# Patient Record
Sex: FEMALE | Race: WHITE | NOT HISPANIC OR LATINO | Employment: OTHER
[De-identification: names, ages, dates, MRNs, and addresses within clinical notes are randomized per-mention and may not be internally consistent; named-entity substitution may affect disease eponyms.]

---

## 2017-02-02 ENCOUNTER — HOSPITAL (OUTPATIENT)
Dept: OTHER | Age: 77
End: 2017-02-02
Attending: INTERNAL MEDICINE

## 2017-05-09 ENCOUNTER — HOSPITAL (OUTPATIENT)
Dept: OTHER | Age: 77
End: 2017-05-09
Attending: INTERNAL MEDICINE

## 2017-05-09 ENCOUNTER — IMAGING SERVICES (OUTPATIENT)
Dept: OTHER | Age: 77
End: 2017-05-09

## 2017-05-09 ENCOUNTER — CHARTING TRANS (OUTPATIENT)
Dept: OTHER | Age: 77
End: 2017-05-09

## 2017-05-09 LAB
ACANTHOCYTES (ACANT): ABNORMAL
ALBUMIN SERPL-MCNC: 3.9 GM/DL (ref 3.6–5.1)
ALBUMIN/GLOB SERPL: 1.1 {RATIO} (ref 1–2.4)
ALP SERPL-CCNC: 63 UNIT/L (ref 45–117)
ALT SERPL-CCNC: 13 UNIT/L
ANALYZER ANC (IANC): ABNORMAL
ANION GAP SERPL CALC-SCNC: 14 MMOL/L (ref 10–20)
AST SERPL-CCNC: 39 UNIT/L
BASOPHILIC STIPLING (BAST): PRESENT
BASOPHILS # BLD: 0 THOUSAND/MCL (ref 0–0.3)
BASOPHILS NFR BLD: 0 %
BILIRUB SERPL-MCNC: 1 MG/DL (ref 0.2–1)
BUN SERPL-MCNC: 14 MG/DL (ref 6–20)
BUN/CREAT SERPL: 21 (ref 7–25)
CALCIUM SERPL-MCNC: 10 MG/DL (ref 8.4–10.2)
CHLORIDE: 103 MMOL/L (ref 98–107)
CO2 SERPL-SCNC: 25 MMOL/L (ref 21–32)
CREAT SERPL-MCNC: 0.66 MG/DL (ref 0.51–0.95)
DIFFERENTIAL METHOD BLD: ABNORMAL
EOSINOPHIL # BLD: 0.1 THOUSAND/MCL (ref 0.1–0.5)
EOSINOPHIL NFR BLD: 1 %
ERYTHROCYTE [DISTWIDTH] IN BLOOD: 19.3 % (ref 11–15)
GLOBULIN SER-MCNC: 3.5 GM/DL (ref 2–4)
GLUCOSE SERPL-MCNC: 145 MG/DL (ref 65–99)
HEMATOCRIT: 32.9 % (ref 36–46.5)
HGB BLD-MCNC: 11 GM/DL (ref 12–15.5)
HOWELL-JOLLY BODIES (HJBO): PRESENT
LARGE PLATELETS (PLTL): PRESENT
LIPASE SERPL-CCNC: 110 UNIT/L (ref 73–393)
LYMPHOCYTES # BLD: 2 THOUSAND/MCL (ref 1–4)
LYMPHOCYTES NFR BLD: 16 %
MCH RBC QN AUTO: 20.5 PG (ref 26–34)
MCHC RBC AUTO-ENTMCNC: 33.4 GM/DL (ref 32–36.5)
MCV RBC AUTO: 61.3 FL (ref 78–100)
MICROCYTOSIS (MICY): ABNORMAL
MONOCYTES # BLD: 1.1 THOUSAND/MCL (ref 0.3–0.9)
MONOCYTES NFR BLD: 10 %
NEUTROPHILS # BLD: 8 THOUSAND/MCL (ref 1.8–7.7)
NEUTS SEG NFR BLD: 71 %
NRBC BLD MANUAL-RTO: 4 /100 WBC
OVALOCYTES (OVALO): ABNORMAL
PATH REV BLD -IMP: ABNORMAL
PLATELET # BLD: 409 THOUSAND/MCL (ref 140–450)
POTASSIUM SERPL-SCNC: 4.3 MMOL/L (ref 3.4–5.1)
PROT SERPL-MCNC: 7.4 GM/DL (ref 6.4–8.2)
RBC # BLD: 5.37 MILLION/MCL (ref 4–5.2)
SHISTOCYTES (SHSTO): ABNORMAL
SODIUM SERPL-SCNC: 138 MMOL/L (ref 135–145)
TOXIC VACUOLATION (TOXV): PRESENT
VARIANT LYMPHS NFR BLD: 2 % (ref 0–5)
WBC # BLD: 11.3 THOUSAND/MCL (ref 4.2–11)

## 2017-05-10 ENCOUNTER — CHARTING TRANS (OUTPATIENT)
Dept: OTHER | Age: 77
End: 2017-05-10

## 2017-05-10 LAB
MAGNESIUM SERPL-MCNC: 2 MG/DL (ref 1.7–2.4)
PHOSPHATE SERPL-MCNC: 2.4 MG/DL (ref 2.4–4.7)

## 2017-06-01 ENCOUNTER — HOSPITAL (OUTPATIENT)
Dept: OTHER | Age: 77
End: 2017-06-01
Attending: INTERNAL MEDICINE

## 2017-06-01 LAB — GLUCOSE BLDC GLUCOMTR-MCNC: 89 MG/DL (ref 65–99)

## 2017-06-06 ENCOUNTER — HOSPITAL (OUTPATIENT)
Dept: OTHER | Age: 77
End: 2017-06-06
Attending: INTERNAL MEDICINE

## 2017-09-05 ENCOUNTER — HOSPITAL (OUTPATIENT)
Dept: OTHER | Age: 77
End: 2017-09-05
Attending: INTERNAL MEDICINE

## 2017-10-03 ENCOUNTER — HOSPITAL (OUTPATIENT)
Dept: OTHER | Age: 77
End: 2017-10-03
Attending: ANESTHESIOLOGY

## 2017-11-08 ENCOUNTER — HOSPITAL (OUTPATIENT)
Dept: OTHER | Age: 77
End: 2017-11-08
Attending: INTERNAL MEDICINE

## 2018-03-27 ENCOUNTER — LAB SERVICES (OUTPATIENT)
Dept: OTHER | Age: 78
End: 2018-03-27

## 2018-03-27 ENCOUNTER — HOSPITAL (OUTPATIENT)
Dept: OTHER | Age: 78
End: 2018-03-27
Attending: INTERNAL MEDICINE

## 2018-03-27 LAB
ACANTHOCYTES (ACANT): ABNORMAL
ALBUMIN SERPL-MCNC: 3.8 G/DL (ref 3.6–5.1)
ALBUMIN SERPL-MCNC: 3.8 GM/DL (ref 3.6–5.1)
ALBUMIN/GLOB SERPL: 1 (ref 1–2.4)
ALBUMIN/GLOB SERPL: 1 {RATIO} (ref 1–2.4)
ALP SERPL-CCNC: 60 UNIT/L (ref 45–117)
ALP SERPL-CCNC: 60 UNITS/L (ref 45–117)
ALT SERPL-CCNC: 16 UNIT/L
ALT SERPL-CCNC: 16 UNITS/L
ANALYZER ANC (IANC): ABNORMAL
ANION GAP SERPL CALC-SCNC: 10 MMOL/L (ref 10–20)
ANION GAP SERPL CALC-SCNC: 10 MMOL/L (ref 10–20)
AST SERPL-CCNC: 36 UNIT/L
AST SERPL-CCNC: 36 UNITS/L
BASOPHILIC STIPLING (BAST): PRESENT
BASOPHILS # BLD: 0 THOUSAND/MCL (ref 0–0.3)
BASOPHILS NFR BLD: 0 %
BILIRUB SERPL-MCNC: 0.7 MG/DL (ref 0.2–1)
BILIRUB SERPL-MCNC: 0.7 MG/DL (ref 0.2–1)
BUN SERPL-MCNC: 16 MG/DL (ref 6–20)
BUN SERPL-MCNC: 16 MG/DL (ref 6–20)
BUN/CREAT SERPL: 23 (ref 7–25)
BUN/CREAT SERPL: 23 (ref 7–25)
CALCIUM SERPL-MCNC: 9.8 MG/DL (ref 8.4–10.2)
CALCIUM SERPL-MCNC: 9.8 MG/DL (ref 8.4–10.2)
CHLORIDE SERPL-SCNC: 101 MMOL/L (ref 98–107)
CHLORIDE: 101 MMOL/L (ref 98–107)
CO2 SERPL-SCNC: 31 MMOL/L (ref 21–32)
CO2 SERPL-SCNC: 31 MMOL/L (ref 21–32)
CREAT SERPL-MCNC: 0.69 MG/DL (ref 0.51–0.95)
CREAT SERPL-MCNC: 0.69 MG/DL (ref 0.51–0.95)
DIFFERENTIAL METHOD BLD: ABNORMAL
EOSINOPHIL # BLD: 0.1 THOUSAND/MCL (ref 0.1–0.5)
EOSINOPHIL NFR BLD: 1 %
ERYTHROCYTE [DISTWIDTH] IN BLOOD: 20.3 % (ref 11–15)
GLOBULIN SER-MCNC: 4 G/DL (ref 2–4)
GLOBULIN SER-MCNC: 4 GM/DL (ref 2–4)
GLUCOSE SERPL-MCNC: 101 MG/DL (ref 65–99)
GLUCOSE SERPL-MCNC: 101 MG/DL (ref 65–99)
HEMATOCRIT: 30.5 % (ref 36–46.5)
HGB BLD-MCNC: 10.1 GM/DL (ref 12–15.5)
IMM GRANULOCYTES # BLD AUTO: 0 THOUSAND/MCL (ref 0–0.2)
IMM GRANULOCYTES NFR BLD: 0 %
LARGE PLATELETS (PLTL): PRESENT
LIPASE SERPL-CCNC: 119 UNIT/L (ref 73–393)
LIPASE SERPL-CCNC: 119 UNITS/L (ref 73–393)
LYMPHOCYTES # BLD: 2.3 THOUSAND/MCL (ref 1–4)
LYMPHOCYTES NFR BLD: 21 %
MCH RBC QN AUTO: 20.2 PG (ref 26–34)
MCHC RBC AUTO-ENTMCNC: 33.1 GM/DL (ref 32–36.5)
MCV RBC AUTO: 61.1 FL (ref 78–100)
MICROCYTOSIS (MICY): ABNORMAL
MONOCYTES # BLD: 0.9 THOUSAND/MCL (ref 0.3–0.9)
MONOCYTES NFR BLD: 8 %
NEUTROPHILS # BLD: 7.9 THOUSAND/MCL (ref 1.8–7.7)
NEUTROPHILS NFR BLD: 70 %
NEUTS SEG NFR BLD: ABNORMAL %
OVALOCYTES (OVALO): ABNORMAL
PERCENT NRBC: 3 %
PLATELET # BLD: 287 THOUSAND/MCL (ref 140–450)
POTASSIUM SERPL-SCNC: 4.2 MMOL/L (ref 3.4–5.1)
POTASSIUM SERPL-SCNC: 4.2 MMOL/L (ref 3.4–5.1)
PROT SERPL-MCNC: 7.8 GM/DL (ref 6.4–8.2)
RBC # BLD: 4.99 MILLION/MCL (ref 4–5.2)
SHISTOCYTES (SHSTO): ABNORMAL
SODIUM SERPL-SCNC: 138 MMOL/L (ref 135–145)
SODIUM SERPL-SCNC: 138 MMOL/L (ref 135–145)
SPHEROCYTES (SPHE): ABNORMAL
TARGET CELLS (TARGET): ABNORMAL
TOTAL PROTEIN: 7.8 G/DL (ref 6.4–8.2)
TOXIC VACUOLATION (TOXV): PRESENT
WBC # BLD: 11.2 THOUSAND/MCL (ref 4.2–11)

## 2018-03-28 ENCOUNTER — HOSPITAL (OUTPATIENT)
Dept: OTHER | Age: 78
End: 2018-03-28
Attending: INTERNAL MEDICINE

## 2018-03-28 ENCOUNTER — IMAGING SERVICES (OUTPATIENT)
Dept: OTHER | Age: 78
End: 2018-03-28

## 2018-03-28 LAB
ACANTHOCYTES (ACANT): ABNORMAL
ANALYZER ANC (IANC): ABNORMAL
BASOPHILIC STIPLING (BAST): PRESENT
BASOPHILS # BLD: 0 K/MCL (ref 0–0.3)
BASOPHILS NFR BLD: 0 %
DIFFERENTIAL METHOD BLD: ABNORMAL
EOSINOPHIL # BLD: 0.1 K/MCL (ref 0.1–0.5)
EOSINOPHIL NFR BLD: 1 %
ERYTHROCYTE [DISTWIDTH] IN BLOOD: 20.3 % (ref 11–15)
HEMATOCRIT: 30.5 % (ref 36–46.5)
HEMOGLOBIN: 10.1 G/DL (ref 12–15.5)
IMM GRANULOCYTES # BLD AUTO: 0 K/MCL (ref 0–0.2)
IMM GRANULOCYTES NFR BLD: 0 %
LARGE PLATELETS (PLTL): PRESENT
LYMPHOCYTES # BLD: 2.3 K/MCL (ref 1–4)
LYMPHOCYTES NFR BLD: 21 %
MEAN CORPUSCULAR HEMOGLOBIN: 20.2 PG (ref 26–34)
MEAN CORPUSCULAR HGB CONC: 33.1 G/DL (ref 32–36.5)
MEAN CORPUSCULAR VOLUME: 61.1 FL (ref 78–100)
MICROCYTOSIS (MICY): ABNORMAL
MONOCYTES # BLD: 0.9 K/MCL (ref 0.3–0.9)
MONOCYTES NFR BLD: 8 %
NEUTROPHILS # BLD: 7.9 K/MCL (ref 1.8–7.7)
NEUTROPHILS NFR BLD: 70 %
NEUTS SEG NFR BLD: ABNORMAL
NRBC (NRBCRE): 3 %
OVALOCYTES (OVALO): ABNORMAL
PLATELET COUNT: 287 K/MCL (ref 140–450)
RED CELL COUNT: 4.99 MIL/MCL (ref 4–5.2)
SHISTOCYTES (SHSTO): ABNORMAL
SPHEROCYTES (SPHE): ABNORMAL
TARGET CELLS (TARGET): ABNORMAL
TOXIC VACUOLATION (TOXV): PRESENT
WHITE BLOOD COUNT: 11.2 K/MCL (ref 4.2–11)

## 2019-01-01 ENCOUNTER — EXTERNAL RECORD (OUTPATIENT)
Dept: HEALTH INFORMATION MANAGEMENT | Facility: OTHER | Age: 79
End: 2019-01-01

## 2019-01-22 ENCOUNTER — APPOINTMENT (OUTPATIENT)
Dept: CT IMAGING | Facility: HOSPITAL | Age: 79
End: 2019-01-22
Payer: MEDICARE

## 2019-01-22 ENCOUNTER — HOSPITAL ENCOUNTER (EMERGENCY)
Facility: HOSPITAL | Age: 79
Discharge: HOME | End: 2019-01-22
Payer: MEDICARE

## 2019-01-22 VITALS
WEIGHT: 150 LBS | OXYGEN SATURATION: 99 % | RESPIRATION RATE: 13 BRPM | HEART RATE: 61 BPM | TEMPERATURE: 97 F | HEIGHT: 63 IN | BODY MASS INDEX: 26.58 KG/M2 | SYSTOLIC BLOOD PRESSURE: 149 MMHG | DIASTOLIC BLOOD PRESSURE: 69 MMHG

## 2019-01-22 DIAGNOSIS — S06.0X0A CONCUSSION WITHOUT LOSS OF CONSCIOUSNESS, INITIAL ENCOUNTER: ICD-10-CM

## 2019-01-22 DIAGNOSIS — S09.90XA INJURY OF HEAD, INITIAL ENCOUNTER: Primary | ICD-10-CM

## 2019-01-22 PROCEDURE — 70450 CT HEAD/BRAIN W/O DYE: CPT

## 2019-01-22 PROCEDURE — 99284 EMERGENCY DEPT VISIT MOD MDM: CPT

## 2019-01-22 RX ORDER — ONDANSETRON 4 MG/1
4 TABLET, ORALLY DISINTEGRATING ORAL ONCE
Status: COMPLETED | OUTPATIENT
Start: 2019-01-22 | End: 2019-01-22

## 2019-01-22 RX ORDER — ONDANSETRON 4 MG/1
4 TABLET, ORALLY DISINTEGRATING ORAL EVERY 8 HOURS PRN
Qty: 10 TABLET | Refills: 0 | Status: SHIPPED | OUTPATIENT
Start: 2019-01-22 | End: 2019-01-29

## 2019-01-22 NOTE — ED NOTES
Received pt a/ox3, clear speech, nad, no resp distress, ambulatory with steady gait  Here with c/o ha, nausea and dizziness s/p fall on black ice PTA. NO LOC, No blurred vision  Hematoma noted to occipital head +tender to touch. Skin intact.  Reports taking

## 2019-01-22 NOTE — ED PROVIDER NOTES
Patient Seen in: [facility] AND [facility] Emergency Department    History   Patient presents with:  Head Neck Injury (neurologic, musculoskeletal)  Fall (musculoskeletal, neurologic)    Stated Complaint: fall, head impact, unknown loc     HPI    Patient present Abdominal: Soft. She exhibits no distension. There is no tenderness. Musculoskeletal: Normal range of motion. She exhibits no tenderness. Neurological: She is alert and oriented to person, place, and time. She displays normal reflexes.  No cranial ner Medication List    START taking these medications    ondansetron 4 MG Oral Tablet Dispersible  Take 1 tablet (4 mg total) by mouth every 8 (eight) hours as needed.   Qty: 10 tablet Refills: 0

## 2019-03-25 ENCOUNTER — HOSPITAL (OUTPATIENT)
Dept: OTHER | Age: 79
End: 2019-03-25
Attending: INTERNAL MEDICINE

## 2019-03-25 ENCOUNTER — DIAGNOSTIC TRANS (OUTPATIENT)
Dept: OTHER | Age: 79
End: 2019-03-25

## 2019-03-26 ENCOUNTER — HOSPITAL (OUTPATIENT)
Dept: OTHER | Age: 79
End: 2019-03-26

## 2019-03-27 DIAGNOSIS — I65.23 ASYMPTOMATIC BILATERAL CAROTID ARTERY STENOSIS: Primary | ICD-10-CM

## 2019-03-28 ENCOUNTER — ANCILLARY PROCEDURE (OUTPATIENT)
Dept: CARDIOLOGY | Age: 79
End: 2019-03-28
Attending: INTERNAL MEDICINE

## 2019-03-28 ENCOUNTER — LAB SERVICES (OUTPATIENT)
Dept: CARDIOLOGY | Age: 79
End: 2019-03-28

## 2019-03-28 DIAGNOSIS — R07.9 CHEST PAIN, UNSPECIFIED TYPE: ICD-10-CM

## 2019-03-28 DIAGNOSIS — I65.23 ASYMPTOMATIC BILATERAL CAROTID ARTERY STENOSIS: ICD-10-CM

## 2019-03-28 DIAGNOSIS — R07.9 CHEST PAIN, UNSPECIFIED TYPE: Primary | ICD-10-CM

## 2019-03-28 LAB
CHOLEST SERPL-MCNC: 150 MG/DL
CHOLEST/HDLC SERPL: 2.1 {RATIO}
GLUCOSE SERPL-MCNC: 125 MG/DL
HDL CHOLESTEROL, POC: 73
LDL CHOLESTEROL, POC: 67
NONHDLC SERPL-MCNC: 77 MG/DL
STRESS TARGET HR: 141 BPM
TRIGLYCERIDES, POC: 50

## 2019-03-28 PROCEDURE — 78452 HT MUSCLE IMAGE SPECT MULT: CPT | Performed by: INTERNAL MEDICINE

## 2019-03-28 PROCEDURE — 80061 LIPID PANEL: CPT | Performed by: FAMILY MEDICINE

## 2019-03-28 PROCEDURE — 93017 CV STRESS TEST TRACING ONLY: CPT | Performed by: INTERNAL MEDICINE

## 2019-03-28 PROCEDURE — 93016 CV STRESS TEST SUPVJ ONLY: CPT | Performed by: INTERNAL MEDICINE

## 2019-03-28 PROCEDURE — 93880 EXTRACRANIAL BILAT STUDY: CPT | Performed by: INTERNAL MEDICINE

## 2019-03-28 PROCEDURE — A9502 TC99M TETROFOSMIN: HCPCS | Performed by: INTERNAL MEDICINE

## 2019-03-28 RX ORDER — REGADENOSON 0.08 MG/ML
0.4 INJECTION, SOLUTION INTRAVENOUS ONCE
Status: COMPLETED | OUTPATIENT
Start: 2019-03-28 | End: 2019-03-28

## 2019-03-28 RX ADMIN — REGADENOSON 0.4 MG: 0.08 INJECTION, SOLUTION INTRAVENOUS at 10:08

## 2019-03-29 PROCEDURE — 93018 CV STRESS TEST I&R ONLY: CPT | Performed by: INTERNAL MEDICINE

## 2019-03-29 PROCEDURE — 78452 HT MUSCLE IMAGE SPECT MULT: CPT | Performed by: INTERNAL MEDICINE

## 2019-04-18 ENCOUNTER — HOSPITAL (OUTPATIENT)
Dept: OTHER | Age: 79
End: 2019-04-18
Attending: INTERNAL MEDICINE

## 2020-06-10 ENCOUNTER — LAB REQUISITION (OUTPATIENT)
Dept: LAB | Facility: HOSPITAL | Age: 80
End: 2020-06-10
Payer: MEDICARE

## 2020-06-10 DIAGNOSIS — Z20.828 CONTACT WITH AND (SUSPECTED) EXPOSURE TO OTHER VIRAL COMMUNICABLE DISEASES: ICD-10-CM

## 2020-07-08 ENCOUNTER — APPOINTMENT (OUTPATIENT)
Dept: GENERAL RADIOLOGY | Facility: HOSPITAL | Age: 80
DRG: 389 | End: 2020-07-08
Attending: EMERGENCY MEDICINE
Payer: MEDICARE

## 2020-07-08 ENCOUNTER — APPOINTMENT (OUTPATIENT)
Dept: GENERAL RADIOLOGY | Facility: HOSPITAL | Age: 80
DRG: 389 | End: 2020-07-08
Attending: SURGERY
Payer: MEDICARE

## 2020-07-08 ENCOUNTER — HOSPITAL ENCOUNTER (INPATIENT)
Facility: HOSPITAL | Age: 80
LOS: 2 days | Discharge: HOME | DRG: 389 | End: 2020-07-10
Attending: EMERGENCY MEDICINE | Admitting: HOSPITALIST
Payer: MEDICARE

## 2020-07-08 ENCOUNTER — APPOINTMENT (OUTPATIENT)
Dept: CT IMAGING | Facility: HOSPITAL | Age: 80
DRG: 389 | End: 2020-07-08
Attending: EMERGENCY MEDICINE
Payer: MEDICARE

## 2020-07-08 DIAGNOSIS — K56.609 SMALL BOWEL OBSTRUCTION (HCC): Primary | ICD-10-CM

## 2020-07-08 LAB
ALBUMIN SERPL-MCNC: 4.3 G/DL (ref 3.4–5)
ANION GAP SERPL CALC-SCNC: 9 MMOL/L (ref 0–18)
BASOPHILS # BLD AUTO: 0.02 X10(3) UL (ref 0–0.2)
BASOPHILS NFR BLD AUTO: 0.2 %
BILIRUB UR QL: NEGATIVE
BUN BLD-MCNC: 20 MG/DL (ref 7–18)
BUN/CREAT SERPL: 20 (ref 10–20)
CALCIUM BLD-MCNC: 10.4 MG/DL (ref 8.5–10.1)
CHLORIDE SERPL-SCNC: 103 MMOL/L (ref 98–112)
CLARITY UR: CLEAR
CO2 SERPL-SCNC: 26 MMOL/L (ref 21–32)
COLOR UR: YELLOW
CREAT BLD-MCNC: 1 MG/DL (ref 0.55–1.02)
DEPRECATED RDW RBC AUTO: 38.9 FL (ref 35.1–46.3)
EOSINOPHIL # BLD AUTO: 0.02 X10(3) UL (ref 0–0.7)
EOSINOPHIL NFR BLD AUTO: 0.2 %
ERYTHROCYTE [DISTWIDTH] IN BLOOD BY AUTOMATED COUNT: 19.6 % (ref 11–15)
EST. AVERAGE GLUCOSE BLD GHB EST-MCNC: 91 MG/DL (ref 68–126)
GLUCOSE BLD-MCNC: 202 MG/DL (ref 70–99)
GLUCOSE BLDC GLUCOMTR-MCNC: 128 MG/DL (ref 70–99)
GLUCOSE UR-MCNC: NEGATIVE MG/DL
HBA1C MFR BLD HPLC: 4.8 % (ref ?–5.7)
HCT VFR BLD AUTO: 33.7 % (ref 35–48)
HGB BLD-MCNC: 11.1 G/DL (ref 12–16)
HGB UR QL STRIP.AUTO: NEGATIVE
IMM GRANULOCYTES # BLD AUTO: 0.04 X10(3) UL (ref 0–1)
IMM GRANULOCYTES NFR BLD: 0.4 %
KETONES UR-MCNC: NEGATIVE MG/DL
LEUKOCYTE ESTERASE UR QL STRIP.AUTO: NEGATIVE
LYMPHOCYTES # BLD AUTO: 1.26 X10(3) UL (ref 1–4)
LYMPHOCYTES NFR BLD AUTO: 11.3 %
MCH RBC QN AUTO: 20.7 PG (ref 26–34)
MCHC RBC AUTO-ENTMCNC: 32.9 G/DL (ref 31–37)
MCV RBC AUTO: 62.8 FL (ref 80–100)
MONOCYTES # BLD AUTO: 0.37 X10(3) UL (ref 0.1–1)
MONOCYTES NFR BLD AUTO: 3.3 %
NEUTROPHILS # BLD AUTO: 9.46 X10 (3) UL (ref 1.5–7.7)
NEUTROPHILS # BLD AUTO: 9.46 X10(3) UL (ref 1.5–7.7)
NEUTROPHILS NFR BLD AUTO: 84.6 %
NITRITE UR QL STRIP.AUTO: NEGATIVE
OSMOLALITY SERPL CALC.SUM OF ELEC: 294 MOSM/KG (ref 275–295)
PH UR: 6 [PH] (ref 5–8)
PLATELET # BLD AUTO: 339 10(3)UL (ref 150–450)
PLATELET MORPHOLOGY: NORMAL
POTASSIUM SERPL-SCNC: 3.8 MMOL/L (ref 3.5–5.1)
PROT UR-MCNC: NEGATIVE MG/DL
RBC # BLD AUTO: 5.37 X10(6)UL (ref 3.8–5.3)
SARS-COV-2 RNA RESP QL NAA+PROBE: NOT DETECTED
SODIUM SERPL-SCNC: 138 MMOL/L (ref 136–145)
T4 FREE SERPL-MCNC: 1.2 NG/DL (ref 0.8–1.7)
TSI SER-ACNC: 10.2 MIU/ML (ref 0.36–3.74)
UROBILINOGEN UR STRIP-ACNC: <2
WBC # BLD AUTO: 11.2 X10(3) UL (ref 4–11)

## 2020-07-08 PROCEDURE — 74019 RADEX ABDOMEN 2 VIEWS: CPT | Performed by: SURGERY

## 2020-07-08 PROCEDURE — 71045 X-RAY EXAM CHEST 1 VIEW: CPT | Performed by: EMERGENCY MEDICINE

## 2020-07-08 PROCEDURE — 74177 CT ABD & PELVIS W/CONTRAST: CPT | Performed by: EMERGENCY MEDICINE

## 2020-07-08 PROCEDURE — 99223 1ST HOSP IP/OBS HIGH 75: CPT | Performed by: SURGERY

## 2020-07-08 PROCEDURE — 99223 1ST HOSP IP/OBS HIGH 75: CPT | Performed by: HOSPITALIST

## 2020-07-08 RX ORDER — SODIUM CHLORIDE 0.9 % (FLUSH) 0.9 %
3 SYRINGE (ML) INJECTION AS NEEDED
Status: DISCONTINUED | OUTPATIENT
Start: 2020-07-08 | End: 2020-07-10

## 2020-07-08 RX ORDER — SODIUM CHLORIDE AND POTASSIUM CHLORIDE .9; .15 G/100ML; G/100ML
SOLUTION INTRAVENOUS CONTINUOUS
Status: DISCONTINUED | OUTPATIENT
Start: 2020-07-08 | End: 2020-07-10

## 2020-07-08 RX ORDER — HYDROMORPHONE HYDROCHLORIDE 1 MG/ML
0.3 INJECTION, SOLUTION INTRAMUSCULAR; INTRAVENOUS; SUBCUTANEOUS EVERY 4 HOURS PRN
Status: DISCONTINUED | OUTPATIENT
Start: 2020-07-08 | End: 2020-07-10

## 2020-07-08 RX ORDER — HEPARIN SODIUM 5000 [USP'U]/ML
5000 INJECTION, SOLUTION INTRAVENOUS; SUBCUTANEOUS EVERY 12 HOURS SCHEDULED
Status: DISCONTINUED | OUTPATIENT
Start: 2020-07-08 | End: 2020-07-10

## 2020-07-08 RX ORDER — ASPIRIN 81 MG/1
81 TABLET ORAL DAILY
COMMUNITY

## 2020-07-08 RX ORDER — MORPHINE SULFATE 4 MG/ML
4 INJECTION, SOLUTION INTRAMUSCULAR; INTRAVENOUS ONCE
Status: COMPLETED | OUTPATIENT
Start: 2020-07-08 | End: 2020-07-08

## 2020-07-08 RX ORDER — ONDANSETRON 2 MG/ML
4 INJECTION INTRAMUSCULAR; INTRAVENOUS ONCE
Status: COMPLETED | OUTPATIENT
Start: 2020-07-08 | End: 2020-07-08

## 2020-07-08 RX ORDER — SIMVASTATIN 20 MG
20 TABLET ORAL NIGHTLY
COMMUNITY

## 2020-07-08 RX ORDER — GABAPENTIN 300 MG/1
300 CAPSULE ORAL NIGHTLY
COMMUNITY
Start: 2020-06-29

## 2020-07-08 RX ORDER — ONDANSETRON 2 MG/ML
4 INJECTION INTRAMUSCULAR; INTRAVENOUS EVERY 6 HOURS PRN
Status: DISCONTINUED | OUTPATIENT
Start: 2020-07-08 | End: 2020-07-10

## 2020-07-08 NOTE — ED INITIAL ASSESSMENT (HPI)
Pt came in by wheelchair from triage to room 31 for complaints of abdominal pain with Nausea and vomiting x 6 non bloody  since 1 AM this morning. Pain scale at 7/10 per pt. Pt denies fevers, chills, chest pain,SOB.   Per pt she has History of Bowel obst

## 2020-07-08 NOTE — CONSULTS
[facility]    Report of Consultation    Reid Levine Patient Status:  Inpatient    1940 MRN [id number]   Location [facility] 4W/SW/SE Attending Cody Miller MD   Hosp Day # 0 PCP Radha Atkinson MD     Date of Admi weight is 150 lb (68 kg). Her oral temperature is 98.4 °F (36.9 °C). Her blood pressure is 139/80 and her pulse is 76. Her respiration is 16 and oxygen saturation is 97%.    Physical Exam  Alert, NAD  HEENT wnl, anicteric, PERRL  Neck supple, norm ROM, no J serial abd exams, repeat labs, check accucheck, NG/NPO, order obstr series. Ryanne Cason MD  7/8/2020    Obstructive series reviewed: some air in colon, still a few loops of dilated small intestine.       Accucheck 128

## 2020-07-08 NOTE — ED PROVIDER NOTES
Patient Seen in: [facility] AND [facility] Emergency Department      History   Patient presents with:  Abdomen/Flank Pain  Nausea/Vomiting/Diarrhea    Stated Complaint: abdominal pain     HPI    26-year-old female with history of ovarian cancer status post multi 102/75   Pulse 75   Resp 18   Temp 98.3 °F (36.8 °C)   Temp src Oral   SpO2 99 %   O2 Device None (Room air)       Current:/61   Pulse 88   Temp 98.3 °F (36.8 °C) (Oral)   Resp 18   Ht 160 cm (5' 3\")   Wt 68 kg   SpO2 96%   BMI 26.57 kg/m²         P mmol/L    BUN 20 (H) 7 - 18 mg/dL    Creatinine 1.00 0.55 - 1.02 mg/dL    BUN/CREA Ratio 20.0 10.0 - 20.0    Calcium, Total 10.4 (H) 8.5 - 10.1 mg/dL    Calculated Osmolality 294 275 - 295 mOsm/kg    GFR, Non- 53 (L) >=60    GFR, -Am personally reviewed the labs and imaging and found  Mild leukocytosis, anemia, rapid covid negative, CT with high grade SBO  - morphine/zofran ordered  - NGT inserted by RN  - discussed with Dr. Luis Marx - informed him of pt consult  - discussed with Dr. Terell Dietrich

## 2020-07-08 NOTE — ED NOTES
Patient out of department for additional testing, accompanied by this writer, pt transported to CT by stretcher on cardiac monitor. Pt wean from oxygen at this time, pt's oxygen saturation has been on 98 %.

## 2020-07-08 NOTE — PROGRESS NOTES
ADMISSION NOTE    [de-identified]year old female with h/o ovarian cancer with recurrence times 2., sbo times 4 presents with abdominal pain nausea and vomiting that woke her from sleep at about 1 am today. CT shows SBO with transition point in the [facility]. [facility]  Available m

## 2020-07-08 NOTE — H&P
[facility]    PATIENT'S NAME: Hyun Navarrete   ATTENDING PHYSICIAN: Maribell Ho MD   PATIENT ACCOUNT#:   [id number]    LOCATION:  [address] [address] [facility] #:   [id number]       YOB: 1940  ADMISSION DATE:       07/08/2 subsequently required a splenectomy. She has had over 30 rounds of chemotherapy. She has also had a history of SVT for which she had ablation in 1998 and states she has had no further episodes of arrhythmias.   She was recently seen by Cardiology about 1 who appears younger than her stated age and was in minimal discomfort at the time of the interview and exam.  VITAL SIGNS:  Temperature 98.4, pulse 76, respiratory rate 16, blood pressure 139/80, O2 saturation 97% on room air.     HEENT:  Normocephalic, atr is higher than I would expect for stress alone. 3.   Dehydration, mild based on the lab data. We will place on IV fluids while NG tube in place and continue to monitor. 4.   Mild hypercalcemia. Check albumin level.   May simply be related to dehydration

## 2020-07-09 ENCOUNTER — APPOINTMENT (OUTPATIENT)
Dept: GENERAL RADIOLOGY | Facility: HOSPITAL | Age: 80
DRG: 389 | End: 2020-07-09
Attending: HOSPITALIST
Payer: MEDICARE

## 2020-07-09 LAB
ANION GAP SERPL CALC-SCNC: 4 MMOL/L (ref 0–18)
BASOPHILS # BLD AUTO: 0.03 X10(3) UL (ref 0–0.2)
BASOPHILS NFR BLD AUTO: 0.4 %
BUN BLD-MCNC: 15 MG/DL (ref 7–18)
BUN/CREAT SERPL: 20 (ref 10–20)
CALCIUM BLD-MCNC: 8.4 MG/DL (ref 8.5–10.1)
CHLORIDE SERPL-SCNC: 116 MMOL/L (ref 98–112)
CO2 SERPL-SCNC: 24 MMOL/L (ref 21–32)
CREAT BLD-MCNC: 0.75 MG/DL (ref 0.55–1.02)
DEPRECATED RDW RBC AUTO: 41.1 FL (ref 35.1–46.3)
EOSINOPHIL # BLD AUTO: 0.08 X10(3) UL (ref 0–0.7)
EOSINOPHIL NFR BLD AUTO: 1 %
ERYTHROCYTE [DISTWIDTH] IN BLOOD BY AUTOMATED COUNT: 19.5 % (ref 11–15)
GLUCOSE BLD-MCNC: 90 MG/DL (ref 70–99)
HCT VFR BLD AUTO: 26.8 % (ref 35–48)
HGB BLD-MCNC: 8.7 G/DL (ref 12–16)
IMM GRANULOCYTES # BLD AUTO: 0.02 X10(3) UL (ref 0–1)
IMM GRANULOCYTES NFR BLD: 0.3 %
INR BLD: 1.12 (ref 0.9–1.2)
LYMPHOCYTES # BLD AUTO: 2.45 X10(3) UL (ref 1–4)
LYMPHOCYTES NFR BLD AUTO: 32.1 %
MCH RBC QN AUTO: 20.6 PG (ref 26–34)
MCHC RBC AUTO-ENTMCNC: 32.5 G/DL (ref 31–37)
MCV RBC AUTO: 63.5 FL (ref 80–100)
MONOCYTES # BLD AUTO: 0.81 X10(3) UL (ref 0.1–1)
MONOCYTES NFR BLD AUTO: 10.6 %
NEUTROPHILS # BLD AUTO: 4.25 X10 (3) UL (ref 1.5–7.7)
NEUTROPHILS # BLD AUTO: 4.25 X10(3) UL (ref 1.5–7.7)
NEUTROPHILS NFR BLD AUTO: 55.6 %
OSMOLALITY SERPL CALC.SUM OF ELEC: 298 MOSM/KG (ref 275–295)
PHOSPHATE SERPL-MCNC: 2.1 MG/DL (ref 2.5–4.9)
PLATELET # BLD AUTO: 258 10(3)UL (ref 150–450)
POTASSIUM SERPL-SCNC: 4.1 MMOL/L (ref 3.5–5.1)
PROTHROMBIN TIME: 14.2 SECONDS (ref 11.8–14.5)
RBC # BLD AUTO: 4.22 X10(6)UL (ref 3.8–5.3)
SODIUM SERPL-SCNC: 144 MMOL/L (ref 136–145)
WBC # BLD AUTO: 7.6 X10(3) UL (ref 4–11)

## 2020-07-09 PROCEDURE — 99232 SBSQ HOSP IP/OBS MODERATE 35: CPT | Performed by: SURGERY

## 2020-07-09 PROCEDURE — 99233 SBSQ HOSP IP/OBS HIGH 50: CPT | Performed by: HOSPITALIST

## 2020-07-09 PROCEDURE — 74019 RADEX ABDOMEN 2 VIEWS: CPT | Performed by: HOSPITALIST

## 2020-07-09 NOTE — PROGRESS NOTES
[facility]    Progress Note    Kelsiejuanita Sanchez Patient Status:  Inpatient    1940 MRN [id number]   Location [facility] 4W/SW/SE Attending Toño Rodriguez MD   Hosp Day # 1 PCP Felecia Severance, MD        Subjective: appreciate surgical recs. - will continue to monitor    SVT s/p ablation  - currently stable  - on remote tele.     Mild dehydration  - continue IVF    GI ppx: IV ppi  VTE ppx: heparin subq    Greater than 35 min spent with >50% time spent counseling patie 2020. 2. Reflex ileus. 3. Nasogastric tube in the stomach. Dictated by (CST): Shirley Russell MD on 7/09/2020 at 8:14 AM     Finalized by (CST):  Shirley Russell MD on 7/09/2020 at 8:16 AM          Xr Abdomen Obstructive Series Routine(2 Vw)(cpt=740

## 2020-07-09 NOTE — PLAN OF CARE
Lila Munoz is aware of the POC at this time. NG removed and patient is tolerating a small amount of clears. She is passing gas. No BM. Plan to d/c home once medically stable.     Problem: Patient/Family Goals  Goal: Patient/Family Long Term Goal  Descrip Notify MD/LIP if interventions unsuccessful or patient reports new pain  - Anticipate increased pain with activity and pre-medicate as appropriate  Outcome: Progressing     Problem: SAFETY ADULT - FALL  Goal: Free from fall injury  Description  INTERVENTIO Evaluate effectiveness of ordered antiemetic medications  - Provide nonpharmacologic comfort measures as appropriate  - Advance diet as tolerated, if ordered  - Obtain nutritional consult as needed  - Evaluate fluid balance  Outcome: Progressing  Goal: John Tapia

## 2020-07-09 NOTE — PROGRESS NOTES
[address] MAKENZIE Alcantara Patient Status:  Inpatient    1940 MRN [id number]   Location [facility] 4W/SW/SE Attending Trinity Roberts MD   Hosp Day # 1 PCP Juan Baker MD     Date of Admission:   tremors  Back no deformity, no CVA tnd. Physical Exam:   Vital Signs:   height is 5' 3\" (1.6 m) and weight is 156 lb (70.8 kg). Her oral temperature is 98.2 °F (36.8 °C). Her blood pressure is 113/64 and her pulse is 67.  Her respiration is 16 and oxyg Lorie Osorio MD on 7/08/2020 at 8:24 AM     Finalized by (CST): Lorie Osorio MD on 7/08/2020 at 8:34 AM          Xr Abdomen Obstructive Series Routine(2 Vw)(cpt=74019)    Result Date: 7/9/2020  CONCLUSION:  1.  Overall improvement in the appearanc

## 2020-07-09 NOTE — PLAN OF CARE
Problem: Patient/Family Goals  Goal: Patient/Family Long Term Goal  Description  Patient's Long Term Goal: Return home    Interventions:  - NG tube to LIS  - NPO; with ice chips  - Abdominal x-ray  - Pain control  - Nausea/vomiting control; PRN zofran  - SAFETY ADULT - FALL  Goal: Free from fall injury  Description  INTERVENTIONS:  - Assess pt frequently for physical needs  - Identify cognitive and physical deficits and behaviors that affect risk of falls.   - [place] fall precautions as indicated by asse as needed  - Evaluate fluid balance  Outcome: Progressing  Goal: Maintains or returns to baseline bowel function  Description  INTERVENTIONS:  - Assess bowel function  - Maintain adequate hydration with IV or PO as ordered and tolerated  - Evaluate effecti

## 2020-07-10 VITALS
RESPIRATION RATE: 20 BRPM | BODY MASS INDEX: 26.22 KG/M2 | TEMPERATURE: 98 F | HEART RATE: 66 BPM | OXYGEN SATURATION: 100 % | SYSTOLIC BLOOD PRESSURE: 147 MMHG | WEIGHT: 148 LBS | HEIGHT: 63 IN | DIASTOLIC BLOOD PRESSURE: 69 MMHG

## 2020-07-10 LAB
ANION GAP SERPL CALC-SCNC: 4 MMOL/L (ref 0–18)
BASOPHILS # BLD AUTO: 0.04 X10(3) UL (ref 0–0.2)
BASOPHILS NFR BLD AUTO: 0.5 %
BUN BLD-MCNC: 8 MG/DL (ref 7–18)
BUN/CREAT SERPL: 12.9 (ref 10–20)
CALCIUM BLD-MCNC: 7.9 MG/DL (ref 8.5–10.1)
CHLORIDE SERPL-SCNC: 116 MMOL/L (ref 98–112)
CO2 SERPL-SCNC: 25 MMOL/L (ref 21–32)
CREAT BLD-MCNC: 0.62 MG/DL (ref 0.55–1.02)
DEPRECATED RDW RBC AUTO: 40.4 FL (ref 35.1–46.3)
EOSINOPHIL # BLD AUTO: 0.26 X10(3) UL (ref 0–0.7)
EOSINOPHIL NFR BLD AUTO: 3.3 %
ERYTHROCYTE [DISTWIDTH] IN BLOOD BY AUTOMATED COUNT: 19.2 % (ref 11–15)
GLUCOSE BLD-MCNC: 90 MG/DL (ref 70–99)
HAV IGM SER QL: 1.9 MG/DL (ref 1.6–2.6)
HCT VFR BLD AUTO: 25.1 % (ref 35–48)
HGB BLD-MCNC: 8.2 G/DL (ref 12–16)
IMM GRANULOCYTES # BLD AUTO: 0.02 X10(3) UL (ref 0–1)
IMM GRANULOCYTES NFR BLD: 0.3 %
LYMPHOCYTES # BLD AUTO: 2.52 X10(3) UL (ref 1–4)
LYMPHOCYTES NFR BLD AUTO: 32.2 %
MCH RBC QN AUTO: 20.6 PG (ref 26–34)
MCHC RBC AUTO-ENTMCNC: 32.7 G/DL (ref 31–37)
MCV RBC AUTO: 62.9 FL (ref 80–100)
MONOCYTES # BLD AUTO: 0.7 X10(3) UL (ref 0.1–1)
MONOCYTES NFR BLD AUTO: 8.9 %
NEUTROPHILS # BLD AUTO: 4.29 X10 (3) UL (ref 1.5–7.7)
NEUTROPHILS # BLD AUTO: 4.29 X10(3) UL (ref 1.5–7.7)
NEUTROPHILS NFR BLD AUTO: 54.8 %
OSMOLALITY SERPL CALC.SUM OF ELEC: 298 MOSM/KG (ref 275–295)
PHOSPHATE SERPL-MCNC: 1.7 MG/DL (ref 2.5–4.9)
PLATELET # BLD AUTO: 248 10(3)UL (ref 150–450)
POTASSIUM SERPL-SCNC: 3.9 MMOL/L (ref 3.5–5.1)
RBC # BLD AUTO: 3.99 X10(6)UL (ref 3.8–5.3)
SODIUM SERPL-SCNC: 145 MMOL/L (ref 136–145)
WBC # BLD AUTO: 7.8 X10(3) UL (ref 4–11)

## 2020-07-10 PROCEDURE — 99239 HOSP IP/OBS DSCHRG MGMT >30: CPT | Performed by: HOSPITALIST

## 2020-07-10 PROCEDURE — 99232 SBSQ HOSP IP/OBS MODERATE 35: CPT | Performed by: SURGERY

## 2020-07-10 RX ORDER — PANTOPRAZOLE SODIUM 40 MG/1
40 TABLET, DELAYED RELEASE ORAL DAILY
Qty: 14 TABLET | Refills: 0 | Status: SHIPPED | OUTPATIENT
Start: 2020-07-10

## 2020-07-10 NOTE — PROGRESS NOTES
[address]  Maricruz Patient Status:  Inpatient    1940 MRN [id number]   Location [facility] 4W/SW/SE Attending Jeffrey Farfan MD   Hosp Day # 2 PCP Fred Nash MD     Date of Admission:   pulse is 66. Her respiration is 20 and oxygen saturation is 100%. Physical Exam  Alert, NAD  HEENT wnl, anicteric, PERRL  Neck supple, norm ROM, no JVD  L CTA B  H Reg rate  Abd soft, NT, ND, no masses, no hernias, no HSM.   Extr no c/c/e  Skin intact, no

## 2020-07-10 NOTE — PLAN OF CARE
Pt a/o x 4. Room air. On remote tele. Denies any nausea or pain. Up with standby assist.  0.9NS with 20KCL infusing at 100ml/hr. Clear liquid diet tolerated.   + gas but no BM yet. Will continue to monitor. BLP, Call light in reach.      Problem: Kayla and pain management  - Manage/alleviate anxiety  - Utilize distraction and/or relaxation techniques  - Monitor for opioid side effects  - Notify MD/LIP if interventions unsuccessful or patient reports new pain  - Anticipate increased pain with activity and vomiting  Description  INTERVENTIONS:  - Maintain adequate hydration with IV or PO as ordered and tolerated  - Nasogastric tube to low intermittent suction as ordered  - Evaluate effectiveness of ordered antiemetic medications  - Provide nonpharmacologic c

## 2020-07-10 NOTE — PLAN OF CARE
Discharge instructions given to patient. Mery verbalizes understanding.  IV removed and patient left in stable condition,     Problem: Patient/Family Goals  Goal: Patient/Family Long Term Goal  Description  Patient's Long Term Goal: Return home    Mil Santamaria response  - Implement non-pharmacological measures as appropriate and evaluate response  - Consider cultural and social influences on pain and pain management  - Manage/alleviate anxiety  - Utilize distraction and/or relaxation techniques  - Monitor for op Management Department for coordinating discharge planning if the patient needs post-hospital services based on physician/LIP order or complex needs related to functional status, cognitive ability or social support system  7/10/2020 1312 by Romario Shane, on fluid and nutrition restrictions as appropriate  7/10/2020 1312 by Ann Agosto RN  Outcome: Adequate for Discharge  7/10/2020 1312 by Ann Agosto RN  Outcome: Progressing     Problem: SKIN/TISSUE INTEGRITY - ADULT  Goal: Skin integrity remains

## 2020-07-12 NOTE — DISCHARGE SUMMARY
[facility]    PATIENT'S NAME: Roxane Tony   ATTENDING PHYSICIAN: Sena Lyman MD   PATIENT ACCOUNT#:   [id number]    LOCATION:  [address] [place] [address] #:   [id number]       YOB: 1940  ADMISSION DATE:       07/0 room air. GENERAL:  Patient lying in bed, appears to be in no acute distress at this time. She is A and O x3. HEENT:  Extraocular movements intact. Pupils equal, round, and reactive to light and accommodation. Atraumatic, normocephalic.   CARDIAC:   S

## 2020-08-20 ENCOUNTER — APPOINTMENT (OUTPATIENT)
Dept: GENERAL RADIOLOGY | Facility: HOSPITAL | Age: 80
DRG: 390 | End: 2020-08-20
Attending: EMERGENCY MEDICINE
Payer: MEDICARE

## 2020-08-20 ENCOUNTER — APPOINTMENT (OUTPATIENT)
Dept: CT IMAGING | Facility: HOSPITAL | Age: 80
DRG: 390 | End: 2020-08-20
Attending: EMERGENCY MEDICINE
Payer: MEDICARE

## 2020-08-20 ENCOUNTER — HOSPITAL ENCOUNTER (INPATIENT)
Facility: HOSPITAL | Age: 80
LOS: 2 days | Discharge: HOME | DRG: 390 | End: 2020-08-22
Attending: EMERGENCY MEDICINE | Admitting: HOSPITALIST
Payer: MEDICARE

## 2020-08-20 DIAGNOSIS — K56.609 SBO (SMALL BOWEL OBSTRUCTION) (HCC): Primary | ICD-10-CM

## 2020-08-20 LAB
ANION GAP SERPL CALC-SCNC: 8 MMOL/L (ref 0–18)
BASOPHILS # BLD: 0.1 X10(3) UL (ref 0–0.2)
BASOPHILS NFR BLD: 1 %
BILIRUB UR QL: NEGATIVE
BUN BLD-MCNC: 19 MG/DL (ref 7–18)
BUN/CREAT SERPL: 18.6 (ref 10–20)
CALCIUM BLD-MCNC: 10.1 MG/DL (ref 8.5–10.1)
CHLORIDE SERPL-SCNC: 106 MMOL/L (ref 98–112)
CO2 SERPL-SCNC: 25 MMOL/L (ref 21–32)
COLOR UR: YELLOW
CREAT BLD-MCNC: 1.02 MG/DL (ref 0.55–1.02)
DEPRECATED RDW RBC AUTO: 39.5 FL (ref 35.1–46.3)
EOSINOPHIL # BLD: 0.1 X10(3) UL (ref 0–0.7)
EOSINOPHIL NFR BLD: 1 %
ERYTHROCYTE [DISTWIDTH] IN BLOOD BY AUTOMATED COUNT: 19.6 % (ref 11–15)
GLUCOSE BLD-MCNC: 108 MG/DL (ref 70–99)
GLUCOSE UR-MCNC: NEGATIVE MG/DL
HCT VFR BLD AUTO: 28.4 % (ref 35–48)
HELMET CELLS BLD QL SMEAR: PRESENT
HGB BLD-MCNC: 9.5 G/DL (ref 12–16)
HGB UR QL STRIP.AUTO: NEGATIVE
HYALINE CASTS #/AREA URNS AUTO: 4 /LPF
KETONES UR-MCNC: NEGATIVE MG/DL
LYMPHOCYTES NFR BLD: 3.17 X10(3) UL (ref 1–4)
LYMPHOCYTES NFR BLD: 33 %
MCH RBC QN AUTO: 20.7 PG (ref 26–34)
MCHC RBC AUTO-ENTMCNC: 33.5 G/DL (ref 31–37)
MCV RBC AUTO: 61.9 FL (ref 80–100)
MONOCYTES # BLD: 0.58 X10(3) UL (ref 0.1–1)
MONOCYTES NFR BLD: 6 %
NEUTROPHILS # BLD AUTO: 6.63 X10 (3) UL (ref 1.5–7.7)
NEUTROPHILS NFR BLD: 57 %
NEUTS BAND NFR BLD: 2 %
NEUTS HYPERSEG # BLD: 5.66 X10(3) UL (ref 1.5–7.7)
NITRITE UR QL STRIP.AUTO: NEGATIVE
NRBC BLD MANUAL-RTO: 9 %
OSMOLALITY SERPL CALC.SUM OF ELEC: 291 MOSM/KG (ref 275–295)
PAPPENHEIMER BOD BLD QL SMEAR: PRESENT
PH UR: 7 [PH] (ref 5–8)
PLATELET # BLD AUTO: 293 10(3)UL (ref 150–450)
PLATELET MORPHOLOGY: NORMAL
POTASSIUM SERPL-SCNC: 4.7 MMOL/L (ref 3.5–5.1)
PROT UR-MCNC: NEGATIVE MG/DL
RBC # BLD AUTO: 4.59 X10(6)UL (ref 3.8–5.3)
RBC #/AREA URNS AUTO: 1 /HPF
SARS-COV-2 RNA RESP QL NAA+PROBE: NOT DETECTED
SODIUM SERPL-SCNC: 139 MMOL/L (ref 136–145)
SP GR UR STRIP: 1.01 (ref 1–1.03)
TOTAL CELLS COUNTED: 100
UROBILINOGEN UR STRIP-ACNC: <2
WBC # BLD AUTO: 9.6 X10(3) UL (ref 4–11)
WBC #/AREA URNS AUTO: 4 /HPF

## 2020-08-20 PROCEDURE — 71045 X-RAY EXAM CHEST 1 VIEW: CPT | Performed by: EMERGENCY MEDICINE

## 2020-08-20 PROCEDURE — 74177 CT ABD & PELVIS W/CONTRAST: CPT | Performed by: EMERGENCY MEDICINE

## 2020-08-20 PROCEDURE — 99222 1ST HOSP IP/OBS MODERATE 55: CPT | Performed by: HOSPITALIST

## 2020-08-20 RX ORDER — MORPHINE SULFATE 4 MG/ML
4 INJECTION, SOLUTION INTRAMUSCULAR; INTRAVENOUS EVERY 30 MIN PRN
Status: DISCONTINUED | OUTPATIENT
Start: 2020-08-20 | End: 2020-08-21

## 2020-08-20 RX ORDER — MORPHINE SULFATE 4 MG/ML
4 INJECTION, SOLUTION INTRAMUSCULAR; INTRAVENOUS ONCE
Status: COMPLETED | OUTPATIENT
Start: 2020-08-20 | End: 2020-08-20

## 2020-08-20 RX ORDER — SODIUM CHLORIDE 9 MG/ML
INJECTION, SOLUTION INTRAVENOUS CONTINUOUS
Status: DISCONTINUED | OUTPATIENT
Start: 2020-08-20 | End: 2020-08-21

## 2020-08-20 RX ORDER — ONDANSETRON 2 MG/ML
4 INJECTION INTRAMUSCULAR; INTRAVENOUS EVERY 4 HOURS PRN
Status: DISCONTINUED | OUTPATIENT
Start: 2020-08-20 | End: 2020-08-21

## 2020-08-20 RX ORDER — ONDANSETRON 2 MG/ML
4 INJECTION INTRAMUSCULAR; INTRAVENOUS ONCE
Status: COMPLETED | OUTPATIENT
Start: 2020-08-20 | End: 2020-08-20

## 2020-08-20 RX ORDER — MIDAZOLAM HYDROCHLORIDE 5 MG/ML
2.5 INJECTION INTRAMUSCULAR; INTRAVENOUS ONCE
Status: COMPLETED | OUTPATIENT
Start: 2020-08-20 | End: 2020-08-20

## 2020-08-21 LAB
ANION GAP SERPL CALC-SCNC: 6 MMOL/L (ref 0–18)
BASOPHILS # BLD: 0 X10(3) UL (ref 0–0.2)
BASOPHILS NFR BLD: 0 %
BUN BLD-MCNC: 20 MG/DL (ref 7–18)
BUN/CREAT SERPL: 22 (ref 10–20)
CALCIUM BLD-MCNC: 9.2 MG/DL (ref 8.5–10.1)
CHLORIDE SERPL-SCNC: 111 MMOL/L (ref 98–112)
CO2 SERPL-SCNC: 22 MMOL/L (ref 21–32)
CREAT BLD-MCNC: 0.91 MG/DL (ref 0.55–1.02)
DEPRECATED RDW RBC AUTO: 40.8 FL (ref 35.1–46.3)
EOSINOPHIL # BLD: 0 X10(3) UL (ref 0–0.7)
EOSINOPHIL NFR BLD: 0 %
ERYTHROCYTE [DISTWIDTH] IN BLOOD BY AUTOMATED COUNT: 19.7 % (ref 11–15)
GLUCOSE BLD-MCNC: 147 MG/DL (ref 70–99)
HAV IGM SER QL: 2 MG/DL (ref 1.6–2.6)
HCT VFR BLD AUTO: 29.1 % (ref 35–48)
HGB BLD-MCNC: 9.3 G/DL (ref 12–16)
LYMPHOCYTES NFR BLD: 0.98 X10(3) UL (ref 1–4)
LYMPHOCYTES NFR BLD: 9 %
MCH RBC QN AUTO: 20.4 PG (ref 26–34)
MCHC RBC AUTO-ENTMCNC: 32 G/DL (ref 31–37)
MCV RBC AUTO: 64 FL (ref 80–100)
MONOCYTES # BLD: 0.55 X10(3) UL (ref 0.1–1)
MONOCYTES NFR BLD: 5 %
NEUTROPHILS # BLD AUTO: 8.75 X10 (3) UL (ref 1.5–7.7)
NEUTROPHILS NFR BLD: 50 %
NEUTS BAND NFR BLD: 36 %
NEUTS HYPERSEG # BLD: 9.37 X10(3) UL (ref 1.5–7.7)
NRBC BLD MANUAL-RTO: 17 %
OSMOLALITY SERPL CALC.SUM OF ELEC: 293 MOSM/KG (ref 275–295)
PLATELET # BLD AUTO: 267 10(3)UL (ref 150–450)
PLATELET MORPHOLOGY: NORMAL
POTASSIUM SERPL-SCNC: 4.5 MMOL/L (ref 3.5–5.1)
RBC # BLD AUTO: 4.55 X10(6)UL (ref 3.8–5.3)
SODIUM SERPL-SCNC: 139 MMOL/L (ref 136–145)
TOTAL CELLS COUNTED: 100
WBC # BLD AUTO: 10.9 X10(3) UL (ref 4–11)

## 2020-08-21 PROCEDURE — 99233 SBSQ HOSP IP/OBS HIGH 50: CPT | Performed by: HOSPITALIST

## 2020-08-21 PROCEDURE — 99223 1ST HOSP IP/OBS HIGH 75: CPT | Performed by: SURGERY

## 2020-08-21 RX ORDER — ONDANSETRON 2 MG/ML
4 INJECTION INTRAMUSCULAR; INTRAVENOUS EVERY 6 HOURS PRN
Status: DISCONTINUED | OUTPATIENT
Start: 2020-08-21 | End: 2020-08-22

## 2020-08-21 RX ORDER — DEXTROSE, SODIUM CHLORIDE, AND POTASSIUM CHLORIDE 5; .45; .15 G/100ML; G/100ML; G/100ML
INJECTION INTRAVENOUS CONTINUOUS
Status: DISCONTINUED | OUTPATIENT
Start: 2020-08-21 | End: 2020-08-22

## 2020-08-21 RX ORDER — HYDRALAZINE HYDROCHLORIDE 20 MG/ML
10 INJECTION INTRAMUSCULAR; INTRAVENOUS EVERY 4 HOURS PRN
Status: DISCONTINUED | OUTPATIENT
Start: 2020-08-21 | End: 2020-08-22

## 2020-08-21 RX ORDER — HEPARIN SODIUM 5000 [USP'U]/ML
5000 INJECTION, SOLUTION INTRAVENOUS; SUBCUTANEOUS EVERY 12 HOURS
Status: DISCONTINUED | OUTPATIENT
Start: 2020-08-21 | End: 2020-08-22

## 2020-08-21 RX ORDER — MORPHINE SULFATE 2 MG/ML
2 INJECTION, SOLUTION INTRAMUSCULAR; INTRAVENOUS EVERY 2 HOUR PRN
Status: DISCONTINUED | OUTPATIENT
Start: 2020-08-21 | End: 2020-08-22

## 2020-08-21 RX ORDER — MORPHINE SULFATE 4 MG/ML
4 INJECTION, SOLUTION INTRAMUSCULAR; INTRAVENOUS EVERY 2 HOUR PRN
Status: DISCONTINUED | OUTPATIENT
Start: 2020-08-21 | End: 2020-08-22

## 2020-08-21 RX ORDER — METOCLOPRAMIDE HYDROCHLORIDE 5 MG/ML
10 INJECTION INTRAMUSCULAR; INTRAVENOUS EVERY 6 HOURS PRN
Status: DISCONTINUED | OUTPATIENT
Start: 2020-08-21 | End: 2020-08-21

## 2020-08-21 NOTE — PROGRESS NOTES
[facility]    Progress Note    Mikael Lee Patient Status:  Inpatient    1940 MRN [id number]   Location [facility] 4W/SW/SE Attending Genie Hughes MD   Hosp Day # 1 PCP Shama Murphy MD       Subjective:     Fee cancer. Postoperative changes again noted from a previous hysterectomy and bilateral salpingo-oophorectomy. No evidence of recurrent malignancy in the abdomen or pelvis. 3. Postoperative changes of a distal pancreatectomy and splenectomy again noted.   Mi

## 2020-08-21 NOTE — PLAN OF CARE
Pt A/O x4, VSS. NPO. L NGT to LIS with little to no output. Voiding. IVF continued. Denies nausea. Fall precautions in place. Call light within reach. Calls appropriately. Frequent rounding. Will continue to monitor.      Problem: Patient [address] pain  - Anticipate increased pain with activity and pre-medicate as appropriate  Outcome: Progressing     Problem: SAFETY ADULT - FALL  Goal: Free from fall injury  Description  INTERVENTIONS:  - Assess pt frequently for physical needs  - Identify cognitiv nonpharmacologic comfort measures as appropriate  - Advance diet as tolerated, if ordered  - Obtain nutritional consult as needed  - Evaluate fluid balance  Outcome: Progressing  Goal: Maintains or returns to baseline bowel function  Description  INTERVENT

## 2020-08-21 NOTE — PLAN OF CARE
Patient was admitted to the unit during the middle of the night with a SBO. Alert & oriented x4. Room air. Vitals stable, afebrile. Pain and nausea being managed with morphine and zofran prn. Pt had one episode of emesis this AM.  IVF infusing. NPO.  NGT to techniques  - Monitor for opioid side effects  - Notify MD/LIP if interventions unsuccessful or patient reports new pain  - Anticipate increased pain with activity and pre-medicate as appropriate  Outcome: Progressing     Problem: SAFETY ADULT - FALL  Goal to low intermittent suction as ordered  - Evaluate effectiveness of ordered antiemetic medications  - Provide nonpharmacologic comfort measures as appropriate  - Advance diet as tolerated, if ordered  - Obtain nutritional consult as needed  - Evaluate flui

## 2020-08-21 NOTE — H&P
[facility] HOSP - [facility]    History & Physical    Lamount Back Patient Status:  Emergency    1940 MRN [id number]   Location [address] Attending Williams Girard MD   Hosp Day # 0 PCP Byron White MD     Da No   Sig: Take 300 mg by mouth nightly. simvastatin (ZOCOR) 20 MG Oral Tab   Yes No   Sig: Take 20 mg by mouth nightly. Facility-Administered Medications: None       Review of Systems:  Constitutional:  Weakness, Fatigue. Eye:  Negative.   Ear/Nose/ 08/20/2020     08/20/2020    CO2 25.0 08/20/2020     08/20/2020    CA 10.1 08/20/2020       Imaging:  Ct Abdomen Pelvis Iv Contrast, No Oral (er)    Result Date: 8/20/2020  CONCLUSION:  1.  There are postoperative changes from a previous [facility] dictate outcome      Tristian High MD  8/20/2020  10:24 PM

## 2020-08-21 NOTE — ED INITIAL ASSESSMENT (HPI)
S: Abdominal pain. B: Hx of sbo early July. Patient with abdominal pain today, all over. No vomiting, no fevers, denies diarrhea or constipation. A: Patient is uncomfortable in triage.

## 2020-08-21 NOTE — ED PROVIDER NOTES
Patient Seen in: [facility] AND [facility] Emergency Department    History   No chief complaint on file.       HPI    The patient with a history of small bowel obstructions in the past secondary adhesions from ovarian cancer presents complaining of generalized ab SpO2 08/20/20 1953 98 %   O2 Device 08/20/20 2350 None (Room air)       All measures to prevent infection transmission during my interaction with the patient were taken. The patient was already wearing a droplet mask on my arrival to the room.  Personal p Notable for the following components:    NRBC 9 (*)     RBC Morphology See morphology below (*)     Pappenheimer Bodies Present (*)     Hypochromia 2+ (*)     Schistocytes 2+ (*)     Target Cells 2+ (*)     [place] Bodies Present (*)     All other com or pelvis. 3. Postoperative changes of a distal pancreatectomy and splenectomy again noted. Mild main pancreatic ductal dilatation in the head of the pancreas is unchanged and tapers distally, which may relate to age-related ectasia.  4. Stable supraumbili vitals.     Pulse Ox: 97%, Room air, Normal     Monitor Interpretation:   normal sinus rhythm    Differential Diagnosis/ Diagnostic Considerations: Small bowel obstruction, diverticulitis, intra-abdominal infection, ileus    Medical Record Review: KENNY quinones

## 2020-08-21 NOTE — CONSULTS
[facility]    PATIENT'S NAME: Kaz Farias   ATTENDING PHYSICIAN: Nora Carey MD   CONSULTING PHYSICIAN: Maria T Cade MD   PATIENT ACCOUNT#:   [id number]    LOCATION:  [address] [place] [address] #:   [id number]       DATE OF MALCOM MRI of the abdomen years ago at [facility] 1677:  Ovarian cancer, supraventricular tachycardia, thalassemia minor, cervical spine stenosis, neuropathy, hearing loss, small-bowel obstruction, hyperlipidemia, GERD.     PAST SURGICAL H left lower quadrant. A ventral hernia around the umbilicus is noted and is chronic and stable and nonobstructing. Other findings include the following:  Degenerative spine changes, postoperative pelvic and abdominal changes.     IMPRESSION:  An 80-year-ol

## 2020-08-22 VITALS
OXYGEN SATURATION: 99 % | SYSTOLIC BLOOD PRESSURE: 127 MMHG | TEMPERATURE: 98 F | HEART RATE: 92 BPM | RESPIRATION RATE: 18 BRPM | DIASTOLIC BLOOD PRESSURE: 87 MMHG

## 2020-08-22 LAB
ANION GAP SERPL CALC-SCNC: 6 MMOL/L (ref 0–18)
BASOPHILS # BLD: 0 X10(3) UL (ref 0–0.2)
BASOPHILS NFR BLD: 0 %
BUN BLD-MCNC: 14 MG/DL (ref 7–18)
BUN/CREAT SERPL: 17.5 (ref 10–20)
CALCIUM BLD-MCNC: 8.4 MG/DL (ref 8.5–10.1)
CHLORIDE SERPL-SCNC: 112 MMOL/L (ref 98–112)
CO2 SERPL-SCNC: 23 MMOL/L (ref 21–32)
CREAT BLD-MCNC: 0.8 MG/DL (ref 0.55–1.02)
DEPRECATED RDW RBC AUTO: 39.4 FL (ref 35.1–46.3)
EOSINOPHIL # BLD: 0 X10(3) UL (ref 0–0.7)
EOSINOPHIL NFR BLD: 0 %
ERYTHROCYTE [DISTWIDTH] IN BLOOD BY AUTOMATED COUNT: 19.6 % (ref 11–15)
GLUCOSE BLD-MCNC: 122 MG/DL (ref 70–99)
HAV IGM SER QL: 2.2 MG/DL (ref 1.6–2.6)
HCT VFR BLD AUTO: 25.6 % (ref 35–48)
HGB BLD-MCNC: 8.6 G/DL (ref 12–16)
LYMPHOCYTES NFR BLD: 1.45 X10(3) UL (ref 1–4)
LYMPHOCYTES NFR BLD: 17 %
MCH RBC QN AUTO: 20.6 PG (ref 26–34)
MCHC RBC AUTO-ENTMCNC: 33.6 G/DL (ref 31–37)
MCV RBC AUTO: 61.2 FL (ref 80–100)
MONOCYTES # BLD: 0.68 X10(3) UL (ref 0.1–1)
MONOCYTES NFR BLD: 8 %
NEUTROPHILS # BLD AUTO: 5.72 X10 (3) UL (ref 1.5–7.7)
NEUTROPHILS NFR BLD: 50 %
NEUTS BAND NFR BLD: 25 %
NEUTS HYPERSEG # BLD: 6.38 X10(3) UL (ref 1.5–7.7)
NEUTS VAC BLD QL SMEAR: PRESENT
NRBC BLD MANUAL-RTO: 4 %
OSMOLALITY SERPL CALC.SUM OF ELEC: 294 MOSM/KG (ref 275–295)
PAPPENHEIMER BOD BLD QL SMEAR: PRESENT
PLATELET # BLD AUTO: 152 10(3)UL (ref 150–450)
PLATELET MORPHOLOGY: NORMAL
POTASSIUM SERPL-SCNC: 4.1 MMOL/L (ref 3.5–5.1)
RBC # BLD AUTO: 4.18 X10(6)UL (ref 3.8–5.3)
SODIUM SERPL-SCNC: 141 MMOL/L (ref 136–145)
TOTAL CELLS COUNTED: 100
WBC # BLD AUTO: 8.5 X10(3) UL (ref 4–11)

## 2020-08-22 PROCEDURE — 99232 SBSQ HOSP IP/OBS MODERATE 35: CPT | Performed by: SURGERY

## 2020-08-22 PROCEDURE — 99239 HOSP IP/OBS DSCHRG MGMT >30: CPT | Performed by: HOSPITALIST

## 2020-08-22 NOTE — PLAN OF CARE
Pt A/O x4. Irritable d/t NGT. Per pt NGT came out by itself. Tolerating clears, okay to d/c home on clears per surgery. Denies pain/nausea. Fall precautions in place. Call light within reach. Calls appropriately. Frequent rounding.  Will continue to monitor effects  - Notify MD/LIP if interventions unsuccessful or patient reports new pain  - Anticipate increased pain with activity and pre-medicate as appropriate  Outcome: Adequate for Discharge     Problem: SAFETY ADULT - FALL  Goal: Free from fall injury  Abel Carver

## 2020-08-22 NOTE — PROGRESS NOTES
[facility]    Progress Note    Bandar Lakhani Patient Status:  Inpatient    1940 MRN [id number]   [facility] 4W/SW/SE Attending Milagro Givens MD   Hosp Day # 2 PCP Marry Dumont MD     Assessment and Plan: NG tube tip in gastric body. 2. No acute finding. 3. Mild stable cardiomegaly. 4. Atherosclerosis aorta.  1.   Dictated by (CST): Maximo Blank MD on 8/21/2020 at 6:36 AM     Finalized by (CST): Maximo Blank MD on 8/21/2020 at 6:39 AM          Ct Abdomen

## 2020-08-22 NOTE — DISCHARGE SUMMARY
[facility]    Discharge Summary    Dileep Melendrez Patient Status:  Inpatient    1940 MRN [id number]   Location [facility] 4W/SW/SE Attending Izabela Sparks MD   Hosp Day # 2 PCP Fabiola Castle MD     Date of Admissi out now. Gen surg was on consult     Microcytic anemia  Stable.    No signs of active bleeding at this time.    Patient with history of thalassemia minor.       GERD  PPI daily     Hyperlipidemia  Resume statin.     dvt proph:    heparin       Code status:

## 2020-08-22 NOTE — PLAN OF CARE
Patient is alert & oriented x4. Room air. Left NGT to LIS with little output. Denies any pain or nausea. NPO. IVF infusing. Voiding WNL. +Gas. Heparin & SCDs for dvt propylaxis. Up with SBA. Safety plan in place.      Problem: Patient Centered Care  Goal: P pain  - Anticipate increased pain with activity and pre-medicate as appropriate  Outcome: Progressing     Problem: SAFETY ADULT - FALL  Goal: Free from fall injury  Description  INTERVENTIONS:  - Assess pt frequently for physical needs  - Identify cognitiv nonpharmacologic comfort measures as appropriate  - Advance diet as tolerated, if ordered  - Obtain nutritional consult as needed  - Evaluate fluid balance  Outcome: Progressing  Goal: Maintains or returns to baseline bowel function  Description  INTERVENT

## 2020-08-28 DIAGNOSIS — I51.7 CARDIOMEGALY: Primary | ICD-10-CM

## 2020-09-09 ENCOUNTER — HOSPITAL (OUTPATIENT)
Dept: OTHER | Age: 80
End: 2020-09-09
Attending: INTERNAL MEDICINE

## 2020-09-09 PROCEDURE — 93306 TTE W/DOPPLER COMPLETE: CPT | Performed by: INTERNAL MEDICINE

## 2020-10-20 ENCOUNTER — EXTERNAL LAB (OUTPATIENT)
Dept: OTHER | Age: 80
End: 2020-10-20

## 2020-10-20 LAB — LAB RESULT: NORMAL

## 2021-01-01 ENCOUNTER — EXTERNAL RECORD (OUTPATIENT)
Dept: HEALTH INFORMATION MANAGEMENT | Facility: OTHER | Age: 81
End: 2021-01-01

## 2021-04-20 ENCOUNTER — EXTERNAL LAB (OUTPATIENT)
Dept: OTHER | Age: 81
End: 2021-04-20

## 2021-04-20 ENCOUNTER — EXTERNAL RECORD (OUTPATIENT)
Dept: OTHER | Age: 81
End: 2021-04-20

## 2021-04-20 LAB — LAB RESULT: NORMAL

## 2021-07-07 ENCOUNTER — EXTERNAL LAB (OUTPATIENT)
Dept: OTHER | Age: 81
End: 2021-07-07

## 2021-07-07 LAB — LAB RESULT: NORMAL

## 2021-07-19 ENCOUNTER — HOSPITAL ENCOUNTER (INPATIENT)
Age: 81
LOS: 1 days | Discharge: HOME | DRG: 390 | End: 2021-07-20
Attending: EMERGENCY MEDICINE | Admitting: INTERNAL MEDICINE

## 2021-07-19 ENCOUNTER — APPOINTMENT (OUTPATIENT)
Dept: CT IMAGING | Age: 81
DRG: 390 | End: 2021-07-19

## 2021-07-19 DIAGNOSIS — R10.84 DIFFUSE ABDOMINAL PAIN: Primary | ICD-10-CM

## 2021-07-19 DIAGNOSIS — R11.2 NAUSEA AND VOMITING IN ADULT: ICD-10-CM

## 2021-07-19 LAB
ALBUMIN SERPL-MCNC: 4.5 G/DL (ref 3.6–5.1)
ALBUMIN/GLOB SERPL: 1.3 {RATIO} (ref 1–2.4)
ALP SERPL-CCNC: 61 UNITS/L (ref 45–117)
ALT SERPL-CCNC: 16 UNITS/L
ANION GAP SERPL CALC-SCNC: 11 MMOL/L (ref 10–20)
APPEARANCE UR: CLEAR
AST SERPL-CCNC: 25 UNITS/L
ATRIAL RATE (BPM): 79
BASOPHILS # BLD: 0 K/MCL (ref 0–0.3)
BASOPHILS NFR BLD: 0 %
BILIRUB SERPL-MCNC: 1.3 MG/DL (ref 0.2–1)
BILIRUB UR QL STRIP: NEGATIVE
BUN SERPL-MCNC: 17 MG/DL (ref 6–20)
BUN/CREAT SERPL: 19 (ref 7–25)
CALCIUM SERPL-MCNC: 10.3 MG/DL (ref 8.4–10.2)
CHLORIDE SERPL-SCNC: 106 MMOL/L (ref 98–107)
CO2 SERPL-SCNC: 24 MMOL/L (ref 21–32)
COLOR UR: YELLOW
CREAT SERPL-MCNC: 0.88 MG/DL (ref 0.51–0.95)
DEPRECATED RDW RBC: 40.3 FL (ref 39–50)
EOSINOPHIL # BLD: 0 K/MCL (ref 0–0.5)
EOSINOPHIL NFR BLD: 0 %
ERYTHROCYTE [DISTWIDTH] IN BLOOD: 19.4 % (ref 11–15)
FASTING DURATION TIME PATIENT: ABNORMAL H
GFR SERPLBLD BASED ON 1.73 SQ M-ARVRAT: 62 ML/MIN/1.73M2
GLOBULIN SER-MCNC: 3.5 G/DL (ref 2–4)
GLUCOSE SERPL-MCNC: 174 MG/DL (ref 65–99)
GLUCOSE UR STRIP-MCNC: NEGATIVE MG/DL
HCT VFR BLD CALC: 30.5 % (ref 36–46.5)
HGB BLD-MCNC: 9.9 G/DL (ref 12–15.5)
HGB UR QL STRIP: NEGATIVE
IMM GRANULOCYTES # BLD AUTO: 0.1 K/MCL (ref 0–0.2)
IMM GRANULOCYTES # BLD: 1 %
KETONES UR STRIP-MCNC: NEGATIVE MG/DL
LEUKOCYTE ESTERASE UR QL STRIP: NEGATIVE
LIPASE SERPL-CCNC: 73 UNITS/L (ref 73–393)
LYMPHOCYTES # BLD: 0.4 K/MCL (ref 1–4)
LYMPHOCYTES NFR BLD: 3 %
MCH RBC QN AUTO: 20.7 PG (ref 26–34)
MCHC RBC AUTO-ENTMCNC: 32.5 G/DL (ref 32–36.5)
MCV RBC AUTO: 63.8 FL (ref 78–100)
MONOCYTES # BLD: 0.5 K/MCL (ref 0.3–0.9)
MONOCYTES NFR BLD: 4 %
NEUTROPHILS # BLD: 11.9 K/MCL (ref 1.8–7.7)
NEUTROPHILS NFR BLD: 92 %
NITRITE UR QL STRIP: NEGATIVE
NRBC BLD MANUAL-RTO: 5 /100 WBC
P AXIS (DEGREES): 43
PH UR STRIP: 7 [PH] (ref 5–7)
PLATELET # BLD AUTO: 332 K/MCL (ref 140–450)
POTASSIUM SERPL-SCNC: 4 MMOL/L (ref 3.4–5.1)
PR-INTERVAL (MSEC): 136
PROT SERPL-MCNC: 8 G/DL (ref 6.4–8.2)
PROT UR STRIP-MCNC: NEGATIVE MG/DL
QRS-INTERVAL (MSEC): 84
QT-INTERVAL (MSEC): 408
QTC: 468
R AXIS (DEGREES): 20
RAINBOW EXTRA TUBES HOLD SPECIMEN: NORMAL
RBC # BLD: 4.78 MIL/MCL (ref 4–5.2)
REPORT TEXT: NORMAL
SODIUM SERPL-SCNC: 137 MMOL/L (ref 135–145)
SP GR UR STRIP: >1.03 (ref 1–1.03)
T AXIS (DEGREES): 37
UROBILINOGEN UR STRIP-MCNC: 0.2 MG/DL
VENTRICULAR RATE EKG/MIN (BPM): 79
WBC # BLD: 13 K/MCL (ref 4.2–11)

## 2021-07-19 PROCEDURE — 74177 CT ABD & PELVIS W/CONTRAST: CPT

## 2021-07-19 PROCEDURE — 10002800 HB RX 250 W HCPCS: Performed by: STUDENT IN AN ORGANIZED HEALTH CARE EDUCATION/TRAINING PROGRAM

## 2021-07-19 PROCEDURE — 96374 THER/PROPH/DIAG INJ IV PUSH: CPT

## 2021-07-19 PROCEDURE — 93005 ELECTROCARDIOGRAM TRACING: CPT | Performed by: STUDENT IN AN ORGANIZED HEALTH CARE EDUCATION/TRAINING PROGRAM

## 2021-07-19 PROCEDURE — 10004281 HB COUNTER-STAFF TIME PER 15 MIN

## 2021-07-19 PROCEDURE — 10006031 HB ROOM CHARGE TELEMETRY

## 2021-07-19 PROCEDURE — 93005 ELECTROCARDIOGRAM TRACING: CPT | Performed by: INTERNAL MEDICINE

## 2021-07-19 PROCEDURE — 81003 URINALYSIS AUTO W/O SCOPE: CPT | Performed by: STUDENT IN AN ORGANIZED HEALTH CARE EDUCATION/TRAINING PROGRAM

## 2021-07-19 PROCEDURE — 10002805 HB CONTRAST AGENT: Performed by: STUDENT IN AN ORGANIZED HEALTH CARE EDUCATION/TRAINING PROGRAM

## 2021-07-19 PROCEDURE — 99222 1ST HOSP IP/OBS MODERATE 55: CPT | Performed by: SURGERY

## 2021-07-19 PROCEDURE — 99285 EMERGENCY DEPT VISIT HI MDM: CPT | Performed by: STUDENT IN AN ORGANIZED HEALTH CARE EDUCATION/TRAINING PROGRAM

## 2021-07-19 PROCEDURE — G1004 CDSM NDSC: HCPCS

## 2021-07-19 PROCEDURE — 96375 TX/PRO/DX INJ NEW DRUG ADDON: CPT

## 2021-07-19 PROCEDURE — 80053 COMPREHEN METABOLIC PANEL: CPT | Performed by: STUDENT IN AN ORGANIZED HEALTH CARE EDUCATION/TRAINING PROGRAM

## 2021-07-19 PROCEDURE — 83690 ASSAY OF LIPASE: CPT | Performed by: STUDENT IN AN ORGANIZED HEALTH CARE EDUCATION/TRAINING PROGRAM

## 2021-07-19 PROCEDURE — X1094 NO CHARGE VISIT: HCPCS | Performed by: EMERGENCY MEDICINE

## 2021-07-19 PROCEDURE — 13003287

## 2021-07-19 PROCEDURE — 99285 EMERGENCY DEPT VISIT HI MDM: CPT

## 2021-07-19 PROCEDURE — 10002807 HB RX 258: Performed by: STUDENT IN AN ORGANIZED HEALTH CARE EDUCATION/TRAINING PROGRAM

## 2021-07-19 PROCEDURE — 93010 ELECTROCARDIOGRAM REPORT: CPT | Performed by: INTERNAL MEDICINE

## 2021-07-19 PROCEDURE — 10002807 HB RX 258: Performed by: PHYSICIAN ASSISTANT

## 2021-07-19 PROCEDURE — 85025 COMPLETE CBC W/AUTO DIFF WBC: CPT | Performed by: STUDENT IN AN ORGANIZED HEALTH CARE EDUCATION/TRAINING PROGRAM

## 2021-07-19 RX ORDER — HEPARIN SODIUM 5000 [USP'U]/ML
5000 INJECTION, SOLUTION INTRAVENOUS; SUBCUTANEOUS EVERY 8 HOURS SCHEDULED
Status: DISCONTINUED | OUTPATIENT
Start: 2021-07-19 | End: 2021-07-20 | Stop reason: SINTOL

## 2021-07-19 RX ORDER — SIMVASTATIN 20 MG
20 TABLET ORAL NIGHTLY
COMMUNITY

## 2021-07-19 RX ORDER — GABAPENTIN 300 MG/1
300 CAPSULE ORAL NIGHTLY
Status: ON HOLD | COMMUNITY
End: 2021-07-20 | Stop reason: HOSPADM

## 2021-07-19 RX ORDER — ASPIRIN 81 MG/1
81 TABLET ORAL NIGHTLY
COMMUNITY

## 2021-07-19 RX ORDER — RISEDRONATE SODIUM 150 MG/1
TABLET, FILM COATED ORAL
Status: ON HOLD | COMMUNITY
Start: 2021-04-20 | End: 2021-07-20 | Stop reason: HOSPADM

## 2021-07-19 RX ORDER — MULTIVIT WITH MINERALS/LUTEIN
1 TABLET ORAL
COMMUNITY
End: 2023-01-13

## 2021-07-19 RX ORDER — PHENOL 1.4 %
10 AEROSOL, SPRAY (ML) MUCOUS MEMBRANE NIGHTLY
COMMUNITY

## 2021-07-19 RX ORDER — RISEDRONATE SODIUM 150 MG/1
150 TABLET, FILM COATED ORAL
COMMUNITY

## 2021-07-19 RX ORDER — 0.9 % SODIUM CHLORIDE 0.9 %
2 VIAL (ML) INJECTION EVERY 12 HOURS SCHEDULED
Status: DISCONTINUED | OUTPATIENT
Start: 2021-07-19 | End: 2021-07-20 | Stop reason: HOSPADM

## 2021-07-19 RX ORDER — SODIUM CHLORIDE, SODIUM LACTATE, POTASSIUM CHLORIDE, CALCIUM CHLORIDE 600; 310; 30; 20 MG/100ML; MG/100ML; MG/100ML; MG/100ML
INJECTION, SOLUTION INTRAVENOUS CONTINUOUS
Status: DISCONTINUED | OUTPATIENT
Start: 2021-07-19 | End: 2021-07-19

## 2021-07-19 RX ORDER — ONDANSETRON 2 MG/ML
4 INJECTION INTRAMUSCULAR; INTRAVENOUS ONCE
Status: COMPLETED | OUTPATIENT
Start: 2021-07-19 | End: 2021-07-19

## 2021-07-19 RX ORDER — PROCHLORPERAZINE EDISYLATE 5 MG/ML
5 INJECTION INTRAMUSCULAR; INTRAVENOUS EVERY 4 HOURS PRN
Status: DISCONTINUED | OUTPATIENT
Start: 2021-07-19 | End: 2021-07-20 | Stop reason: HOSPADM

## 2021-07-19 RX ORDER — GABAPENTIN 300 MG/1
300 CAPSULE ORAL NIGHTLY
COMMUNITY
Start: 2020-06-29

## 2021-07-19 RX ORDER — DEXTROSE AND SODIUM CHLORIDE 5; .45 G/100ML; G/100ML
INJECTION, SOLUTION INTRAVENOUS CONTINUOUS
Status: DISCONTINUED | OUTPATIENT
Start: 2021-07-19 | End: 2021-07-20 | Stop reason: HOSPADM

## 2021-07-19 RX ORDER — MAGNESIUM OXIDE 400 MG/1
400 TABLET ORAL NIGHTLY
COMMUNITY

## 2021-07-19 RX ORDER — SIMVASTATIN 20 MG
TABLET ORAL NIGHTLY
Status: ON HOLD | COMMUNITY
End: 2021-07-20 | Stop reason: HOSPADM

## 2021-07-19 RX ADMIN — IOHEXOL 75 ML: 300 INJECTION, SOLUTION INTRAVENOUS at 06:26

## 2021-07-19 RX ADMIN — SODIUM CHLORIDE, SODIUM LACTATE, POTASSIUM CHLORIDE, AND CALCIUM CHLORIDE 1000 ML: .6; .31; .03; .02 INJECTION, SOLUTION INTRAVENOUS at 04:29

## 2021-07-19 RX ADMIN — MORPHINE SULFATE 4 MG: 4 INJECTION INTRAVENOUS at 04:21

## 2021-07-19 RX ADMIN — SODIUM CHLORIDE, SODIUM LACTATE, POTASSIUM CHLORIDE, AND CALCIUM CHLORIDE: .6; .31; .03; .02 INJECTION, SOLUTION INTRAVENOUS at 11:43

## 2021-07-19 RX ADMIN — ONDANSETRON 4 MG: 2 INJECTION INTRAMUSCULAR; INTRAVENOUS at 04:22

## 2021-07-19 ASSESSMENT — COGNITIVE AND FUNCTIONAL STATUS - GENERAL
DO YOU HAVE SERIOUS DIFFICULTY WALKING OR CLIMBING STAIRS: NO
DO YOU HAVE DIFFICULTY DRESSING OR BATHING: NO
ARE YOU BLIND OR DO YOU HAVE SERIOUS DIFFICULTY SEEING, EVEN WHEN WEARING GLASSES: YES
ARE YOU DEAF OR DO YOU HAVE SERIOUS DIFFICULTY  HEARING: YES
BECAUSE OF A PHYSICAL, MENTAL, OR EMOTIONAL CONDITION, DO YOU HAVE DIFFICULTY DOING ERRANDS ALONE: NO
BECAUSE OF A PHYSICAL, MENTAL, OR EMOTIONAL CONDITION, DO YOU HAVE SERIOUS DIFFICULTY CONCENTRATING, REMEMBERING OR MAKING DECISIONS: NO

## 2021-07-19 ASSESSMENT — ENCOUNTER SYMPTOMS
DIARRHEA: 0
HEADACHES: 0
ABDOMINAL PAIN: 1
CHEST TIGHTNESS: 0
FEVER: 0
BRUISES/BLEEDS EASILY: 0
WOUND: 0
SHORTNESS OF BREATH: 0
COUGH: 0
BLOOD IN STOOL: 0
VOMITING: 1
SORE THROAT: 0
CONFUSION: 0
NAUSEA: 1
RHINORRHEA: 0
POLYDIPSIA: 0
BACK PAIN: 0

## 2021-07-19 ASSESSMENT — LIFESTYLE VARIABLES
HOW MANY STANDARD DRINKS CONTAINING ALCOHOL DO YOU HAVE ON A TYPICAL DAY: 0,1 OR 2
HOW OFTEN DO YOU HAVE A DRINK CONTAINING ALCOHOL: NEVER
HOW OFTEN DO YOU HAVE 6 OR MORE DRINKS ON ONE OCCASION: NEVER
ALCOHOL_USE_STATUS: NO OR LOW RISK WITH VALIDATED TOOL
AUDIT-C TOTAL SCORE: 0

## 2021-07-19 ASSESSMENT — PAIN SCALES - GENERAL
PAINLEVEL_OUTOF10: 0
PAINLEVEL_OUTOF10: 10
PAINLEVEL_OUTOF10: 0
PAINLEVEL_OUTOF10: 7
PAINLEVEL_OUTOF10: 0
PAINLEVEL_OUTOF10: 2

## 2021-07-19 ASSESSMENT — ACTIVITIES OF DAILY LIVING (ADL)
ADL_SCORE: 12
CHRONIC_PAIN_PRESENT: YES, CANCER;YES, CHRONIC
ADL_SHORT_OF_BREATH: NO
RECENT_DECLINE_ADL: NO
ADL_BEFORE_ADMISSION: INDEPENDENT
DESCRIBE HOW PAIN IMPACTS YOUR LIFE: NONE/CAN MANAGE

## 2021-07-19 ASSESSMENT — COLUMBIA-SUICIDE SEVERITY RATING SCALE - C-SSRS
6. HAVE YOU EVER DONE ANYTHING, STARTED TO DO ANYTHING, OR PREPARED TO DO ANYTHING TO END YOUR LIFE?: NO
2. HAVE YOU ACTUALLY HAD ANY THOUGHTS OF KILLING YOURSELF?: NO
1. WITHIN THE PAST MONTH, HAVE YOU WISHED YOU WERE DEAD OR WISHED YOU COULD GO TO SLEEP AND NOT WAKE UP?: NO
IS THE PATIENT ABLE TO COMPLETE C-SSRS: YES

## 2021-07-19 ASSESSMENT — PATIENT HEALTH QUESTIONNAIRE - PHQ9
1. LITTLE INTEREST OR PLEASURE IN DOING THINGS: NOT AT ALL
CLINICAL INTERPRETATION OF PHQ9 SCORE: NO FURTHER SCREENING NEEDED
2. FEELING DOWN, DEPRESSED OR HOPELESS: NOT AT ALL
IS PATIENT ABLE TO COMPLETE PHQ2 OR PHQ9: YES
SUM OF ALL RESPONSES TO PHQ9 QUESTIONS 1 AND 2: 0
SUM OF ALL RESPONSES TO PHQ9 QUESTIONS 1 AND 2: 0
CLINICAL INTERPRETATION OF PHQ2 SCORE: NO FURTHER SCREENING NEEDED

## 2021-07-19 ASSESSMENT — ORIENTATION MEMORY CONCENTRATION TEST (OMCT)
SAY THE MONTHS IN REVERSE ORDER STARTING WITH LAST MONTH: CORRECT
WHAT MONTH IS IT NOW: INCORRECT
OMCT SCORE: 10
REPEAT THE NAME AND ADDRESS I ASKED YOU TO REMEMBER: CORRECT
WHAT TIME IS IT (NO WATCH OR CLOCK): INCORRECT
WHAT YEAR IS IT NOW (MUST BE EXACT): INCORRECT
COUNT BACKWARDS FROM 20 TO 1: CORRECT

## 2021-07-19 ASSESSMENT — PAIN DESCRIPTION - PAIN TYPE: TYPE: ACUTE PAIN

## 2021-07-20 ENCOUNTER — CASE MANAGEMENT (OUTPATIENT)
Dept: CARE COORDINATION | Age: 81
End: 2021-07-20

## 2021-07-20 VITALS
HEIGHT: 62 IN | WEIGHT: 150 LBS | OXYGEN SATURATION: 98 % | RESPIRATION RATE: 18 BRPM | TEMPERATURE: 98.1 F | BODY MASS INDEX: 27.6 KG/M2 | SYSTOLIC BLOOD PRESSURE: 115 MMHG | DIASTOLIC BLOOD PRESSURE: 60 MMHG | HEART RATE: 62 BPM

## 2021-07-20 LAB
ANION GAP SERPL CALC-SCNC: 8 MMOL/L (ref 10–20)
ATRIAL RATE (BPM): 76
BUN SERPL-MCNC: 13 MG/DL (ref 6–20)
BUN/CREAT SERPL: 16 (ref 7–25)
CALCIUM SERPL-MCNC: 8.6 MG/DL (ref 8.4–10.2)
CHLORIDE SERPL-SCNC: 112 MMOL/L (ref 98–107)
CO2 SERPL-SCNC: 24 MMOL/L (ref 21–32)
CREAT SERPL-MCNC: 0.8 MG/DL (ref 0.51–0.95)
DEPRECATED RDW RBC: 44 FL (ref 39–50)
ERYTHROCYTE [DISTWIDTH] IN BLOOD: 19.9 % (ref 11–15)
FASTING DURATION TIME PATIENT: ABNORMAL H
GFR SERPLBLD BASED ON 1.73 SQ M-ARVRAT: 69 ML/MIN/1.73M2
GLUCOSE SERPL-MCNC: 109 MG/DL (ref 65–99)
HCT VFR BLD CALC: 23.8 % (ref 36–46.5)
HGB BLD-MCNC: 7.6 G/DL (ref 12–15.5)
MAGNESIUM SERPL-MCNC: 2.1 MG/DL (ref 1.7–2.4)
MCH RBC QN AUTO: 20.7 PG (ref 26–34)
MCHC RBC AUTO-ENTMCNC: 31.9 G/DL (ref 32–36.5)
MCV RBC AUTO: 64.9 FL (ref 78–100)
NRBC BLD MANUAL-RTO: 4 /100 WBC
P AXIS (DEGREES): 21
PHOSPHATE SERPL-MCNC: 2 MG/DL (ref 2.4–4.7)
PLATELET # BLD AUTO: 158 K/MCL (ref 140–450)
POTASSIUM SERPL-SCNC: 3.8 MMOL/L (ref 3.4–5.1)
PR-INTERVAL (MSEC): 136
QRS-INTERVAL (MSEC): 82
QT-INTERVAL (MSEC): 392
QTC: 441
R AXIS (DEGREES): -7
RBC # BLD: 3.67 MIL/MCL (ref 4–5.2)
REPORT TEXT: NORMAL
SODIUM SERPL-SCNC: 140 MMOL/L (ref 135–145)
T AXIS (DEGREES): 71
VENTRICULAR RATE EKG/MIN (BPM): 76
WBC # BLD: 6.5 K/MCL (ref 4.2–11)

## 2021-07-20 PROCEDURE — 84100 ASSAY OF PHOSPHORUS: CPT | Performed by: PHYSICIAN ASSISTANT

## 2021-07-20 PROCEDURE — 85027 COMPLETE CBC AUTOMATED: CPT | Performed by: PHYSICIAN ASSISTANT

## 2021-07-20 PROCEDURE — 10004281 HB COUNTER-STAFF TIME PER 15 MIN

## 2021-07-20 PROCEDURE — 10004651 HB RX, NO CHARGE ITEM: Performed by: INTERNAL MEDICINE

## 2021-07-20 PROCEDURE — 10002803 HB RX 637

## 2021-07-20 PROCEDURE — 83735 ASSAY OF MAGNESIUM: CPT | Performed by: PHYSICIAN ASSISTANT

## 2021-07-20 PROCEDURE — 10002803 HB RX 637: Performed by: INTERNAL MEDICINE

## 2021-07-20 PROCEDURE — 36415 COLL VENOUS BLD VENIPUNCTURE: CPT | Performed by: PHYSICIAN ASSISTANT

## 2021-07-20 PROCEDURE — 10002801 HB RX 250 W/O HCPCS: Performed by: INTERNAL MEDICINE

## 2021-07-20 PROCEDURE — 99231 SBSQ HOSP IP/OBS SF/LOW 25: CPT | Performed by: SURGERY

## 2021-07-20 PROCEDURE — 80048 BASIC METABOLIC PNL TOTAL CA: CPT | Performed by: PHYSICIAN ASSISTANT

## 2021-07-20 RX ORDER — ALBUTEROL SULFATE 90 UG/1
AEROSOL, METERED RESPIRATORY (INHALATION)
Status: DISPENSED
Start: 2021-07-20 | End: 2021-07-20

## 2021-07-20 RX ORDER — POTASSIUM CHLORIDE 20 MEQ/1
40 TABLET, EXTENDED RELEASE ORAL ONCE
Status: COMPLETED | OUTPATIENT
Start: 2021-07-20 | End: 2021-07-20

## 2021-07-20 RX ORDER — ALBUTEROL SULFATE 90 UG/1
2 AEROSOL, METERED RESPIRATORY (INHALATION)
Status: DISCONTINUED | OUTPATIENT
Start: 2021-07-20 | End: 2021-07-20 | Stop reason: HOSPADM

## 2021-07-20 RX ADMIN — DIBASIC SODIUM PHOSPHATE, MONOBASIC POTASSIUM PHOSPHATE AND MONOBASIC SODIUM PHOSPHATE 250 MG: 852; 155; 130 TABLET ORAL at 15:47

## 2021-07-20 RX ADMIN — SODIUM CHLORIDE, PRESERVATIVE FREE 2 ML: 5 INJECTION INTRAVENOUS at 00:35

## 2021-07-20 RX ADMIN — DIBASIC SODIUM PHOSPHATE, MONOBASIC POTASSIUM PHOSPHATE AND MONOBASIC SODIUM PHOSPHATE 250 MG: 852; 155; 130 TABLET ORAL at 13:42

## 2021-07-20 RX ADMIN — SODIUM CHLORIDE, PRESERVATIVE FREE 2 ML: 5 INJECTION INTRAVENOUS at 08:24

## 2021-07-20 RX ADMIN — DEXTROSE AND SODIUM CHLORIDE: 5; 450 INJECTION, SOLUTION INTRAVENOUS at 00:00

## 2021-07-20 RX ADMIN — POTASSIUM CHLORIDE 40 MEQ: 1500 TABLET, EXTENDED RELEASE ORAL at 12:05

## 2021-07-20 ASSESSMENT — COGNITIVE AND FUNCTIONAL STATUS - GENERAL
DO YOU HAVE SERIOUS DIFFICULTY WALKING OR CLIMBING STAIRS: NO
DO YOU HAVE DIFFICULTY DRESSING OR BATHING: NO
BECAUSE OF A PHYSICAL, MENTAL, OR EMOTIONAL CONDITION, DO YOU HAVE DIFFICULTY DOING ERRANDS ALONE: NO
BECAUSE OF A PHYSICAL, MENTAL, OR EMOTIONAL CONDITION, DO YOU HAVE SERIOUS DIFFICULTY CONCENTRATING, REMEMBERING OR MAKING DECISIONS: NO
DO YOU HAVE DIFFICULTY DRESSING OR BATHING: NO
BECAUSE OF A PHYSICAL, MENTAL, OR EMOTIONAL CONDITION, DO YOU HAVE DIFFICULTY DOING ERRANDS ALONE: NO
DO YOU HAVE SERIOUS DIFFICULTY WALKING OR CLIMBING STAIRS: NO
BECAUSE OF A PHYSICAL, MENTAL, OR EMOTIONAL CONDITION, DO YOU HAVE SERIOUS DIFFICULTY CONCENTRATING, REMEMBERING OR MAKING DECISIONS: NO

## 2021-07-20 ASSESSMENT — PAIN SCALES - GENERAL: PAINLEVEL_OUTOF10: 0

## 2021-07-21 ENCOUNTER — CASE MANAGEMENT (OUTPATIENT)
Dept: CARE COORDINATION | Age: 81
End: 2021-07-21

## 2021-10-14 ENCOUNTER — EXTERNAL LAB (OUTPATIENT)
Dept: OTHER | Age: 81
End: 2021-10-14

## 2021-10-14 LAB — LAB RESULT: NORMAL

## 2021-11-09 ENCOUNTER — EXTERNAL RECORD (OUTPATIENT)
Dept: HEALTH INFORMATION MANAGEMENT | Facility: OTHER | Age: 81
End: 2021-11-09

## 2022-02-08 ENCOUNTER — EXTERNAL LAB (OUTPATIENT)
Dept: OTHER | Age: 82
End: 2022-02-08

## 2022-02-08 ENCOUNTER — EXTERNAL RECORD (OUTPATIENT)
Dept: HEALTH INFORMATION MANAGEMENT | Facility: OTHER | Age: 82
End: 2022-02-08

## 2022-02-08 LAB
ABSOLUTE NRBC (AUTO): 0.59 X10'3/MICROL
ALBUMIN SERPL-MCNC: 3.9 G/DL (ref 3.5–5.7)
ALP SERPL-CCNC: 40 UNITS/L (ref 34–104)
ALT SERPL-CCNC: 5 UNITS/L (ref 7–52)
ANION GAP SERPL CALC-SCNC: 8 MMOL/L (ref 6.2–14.7)
AST SERPL-CCNC: 19 UNITS/L (ref 13–39)
BILIRUB SERPL-MCNC: 0.8 MG/DL (ref 0.3–1)
BUN SERPL-MCNC: 17 MG/DL (ref 7–25)
CALCIUM SERPL-MCNC: 9.8 MG/DL (ref 8.6–10.4)
CHLORIDE SERPL-SCNC: 106 MEQ/L (ref 98–107)
CO2 SERPL-SCNC: 26 MEQ/L (ref 21–31)
CREAT SERPL-MCNC: 0.83 MG/DL (ref 0.6–1.2)
ERYTHROCYTE [DISTWIDTH] IN BLOOD: 19.4 % (ref 11.5–14.5)
GLUCOSE SERPL-MCNC: 85 MG/DL (ref 70–99)
HCT VFR BLD CALC: 25.6 % (ref 38–50)
HGB BLD-MCNC: 8.2 G/DL (ref 12.1–16.4)
LENGTH OF FAST TIME PATIENT: ABNORMAL H
MCH RBC QN AUTO: 20.7 PG (ref 26–34)
MCHC RBC AUTO-ENTMCNC: 32 G/DL (ref 30.6–37)
MCV RBC AUTO: 64.5 FL (ref 79–98)
MPV (OFFPRE2): ABNORMAL
NRBC BLD MANUAL-RTO: 9.5 %
PLATELET # BLD: 254 X10'3/MICROL (ref 125–400)
POTASSIUM SERPL-SCNC: 4.6 MMOL/L (ref 3.5–5.1)
PROT SERPL-MCNC: 6.4 G/DL (ref 6.4–8.9)
RBC # BLD: 3.97 X10'6/MICROL (ref 4–5.6)
RBC MORPH BLD: ABNORMAL
SODIUM SERPL-SCNC: 140 MEQ/L (ref 133–144)
WBC # BLD: 6.2 X10'3/MICROL (ref 4.5–11)

## 2022-03-08 ENCOUNTER — EXTERNAL LAB (OUTPATIENT)
Dept: OTHER | Age: 82
End: 2022-03-08

## 2022-03-08 LAB
ABSOLUTE NRBC (AUTO): 0.51 X10'3/MICROL
BASOPHILS # BLD: 0.05 X10'3/MICROL (ref 0–0.11)
BASOPHILS NFR BLD: 1 %
EOSINOPHIL # BLD: 0.11 X10'3/MICROL (ref 0.04–0.32)
EOSINOPHIL NFR BLD: 2 %
ERYTHROCYTE [DISTWIDTH] IN BLOOD: 19.5 % (ref 11.5–14.5)
HCT VFR BLD CALC: 27 % (ref 38–50)
HGB BLD-MCNC: 8.5 G/DL (ref 12.1–16.4)
LYMPHOCYTES # BLD: 1.62 X10'3/MICROL (ref 1.1–3.3)
LYMPHOCYTES NFR BLD: 29 %
MCH RBC QN AUTO: 20.4 PG (ref 26–34)
MCHC RBC AUTO-ENTMCNC: 31.5 G/DL (ref 30.6–37)
MCV RBC AUTO: 64.9 FL (ref 79–98)
MONOCYTES # BLD: 0.65 X10'3/MICROL (ref 0.15–0.58)
MONOCYTES NFR BLD: 12 %
MPV (OFFPRE2): ABNORMAL
NEUTROPHILS # BLD: 3.2 X10'3/MICROL (ref 1.8–9)
NEUTROPHILS NFR BLD: 56 %
NRBC BLD MANUAL-RTO: 9 %
PLATELET # BLD: 258 X10'3/MICROL (ref 125–400)
RBC # BLD: 4.16 X10'6/MICROL (ref 4–5.6)
RBC MORPH BLD: ABNORMAL
WBC # BLD: 5.7 X10'3/MICROL (ref 4.5–11)

## 2022-04-05 ENCOUNTER — EXTERNAL LAB (OUTPATIENT)
Dept: OTHER | Age: 82
End: 2022-04-05

## 2022-04-05 LAB
ABSOLUTE NRBC (AUTO): 0.58 X10'3/MICROL
BASOPHILS # BLD: 0.06 X10'3/MICROL (ref 0–0.11)
BASOPHILS NFR BLD: 1 %
EOSINOPHIL # BLD: 0.12 X10'3/MICROL (ref 0.04–0.32)
EOSINOPHIL NFR BLD: 2 %
ERYTHROCYTE [DISTWIDTH] IN BLOOD: 18.9 % (ref 11.5–14.5)
HCT VFR BLD CALC: 27.3 % (ref 38–50)
HGB BLD-MCNC: 8.6 G/DL (ref 12.1–16.4)
LYMPHOCYTES # BLD: 2.03 X10'3/MICROL (ref 1.1–3.3)
LYMPHOCYTES NFR BLD: 30 %
MCH RBC QN AUTO: 20.5 PG (ref 26–34)
MCHC RBC AUTO-ENTMCNC: 31.5 G/DL (ref 30.6–37)
MCV RBC AUTO: 65.2 FL (ref 79–98)
MONOCYTES # BLD: 0.71 X10'3/MICROL (ref 0.15–0.58)
MONOCYTES NFR BLD: 10 %
MPV (OFFPRE2): ABNORMAL
NEUTROPHILS # BLD: 3.8 X10'3/MICROL (ref 1.8–9)
NEUTROPHILS NFR BLD: 56 %
NRBC BLD MANUAL-RTO: 8.5 %
PLATELET # BLD: 333 X10'3/MICROL (ref 125–400)
RBC # BLD: 4.19 X10'6/MICROL (ref 4–5.6)
WBC # BLD: 6.9 X10'3/MICROL (ref 4.5–11)

## 2022-05-03 ENCOUNTER — OFF PREMISE (OUTPATIENT)
Dept: SURGERY | Age: 82
End: 2022-05-03

## 2022-05-31 ENCOUNTER — EXTERNAL LAB (OUTPATIENT)
Dept: OTHER | Age: 82
End: 2022-05-31

## 2022-05-31 LAB
ABSOLUTE NRBC (AUTO): 0.56 X10'3/MICROL
BASOPHILS # BLD: 0.04 X10'3/MICROL (ref 0–0.11)
BASOPHILS NFR BLD: 1 %
EOSINOPHIL # BLD: 0.11 X10'3/MICROL (ref 0.04–0.32)
EOSINOPHIL NFR BLD: 2 %
ERYTHROCYTE [DISTWIDTH] IN BLOOD: 18.7 % (ref 11.5–14.5)
HCT VFR BLD CALC: 25.6 % (ref 38–50)
HGB BLD-MCNC: 8.2 G/DL (ref 12.1–16.4)
LYMPHOCYTES # BLD: 2.14 X10'3/MICROL (ref 1.1–3.3)
LYMPHOCYTES NFR BLD: 32 %
MCH RBC QN AUTO: 20 PG (ref 26–34)
MCHC RBC AUTO-ENTMCNC: 32 G/DL (ref 30.6–37)
MCV RBC AUTO: 62.4 FL (ref 79–98)
MONOCYTES # BLD: 0.75 X10'3/MICROL (ref 0.15–0.58)
MONOCYTES NFR BLD: 11 %
MPV (OFFPRE2): ABNORMAL
NEUTROPHILS # BLD: 3.6 X10'3/MICROL (ref 1.8–9)
NEUTROPHILS NFR BLD: 54 %
NRBC BLD MANUAL-RTO: 8.3 %
PLAT MORPH BLD: ABNORMAL
PLATELET # BLD: 283 X10'3/MICROL (ref 125–400)
RBC # BLD: 4.1 X10'6/MICROL (ref 4–5.6)
RBC MORPH BLD: ABNORMAL
WBC # BLD: 6.7 X10'3/MICROL (ref 4.5–11)

## 2022-06-04 ENCOUNTER — HOSPITAL ENCOUNTER (INPATIENT)
Age: 82
LOS: 2 days | Discharge: HOME | DRG: 390 | End: 2022-06-06
Attending: EMERGENCY MEDICINE | Admitting: INTERNAL MEDICINE

## 2022-06-04 ENCOUNTER — APPOINTMENT (OUTPATIENT)
Dept: CT IMAGING | Age: 82
DRG: 390 | End: 2022-06-04
Attending: EMERGENCY MEDICINE

## 2022-06-04 ENCOUNTER — APPOINTMENT (OUTPATIENT)
Dept: GENERAL RADIOLOGY | Age: 82
DRG: 390 | End: 2022-06-04
Attending: STUDENT IN AN ORGANIZED HEALTH CARE EDUCATION/TRAINING PROGRAM

## 2022-06-04 DIAGNOSIS — K56.609 SBO (SMALL BOWEL OBSTRUCTION) (CMD): Primary | ICD-10-CM

## 2022-06-04 PROBLEM — R10.9 ABDOMINAL PAIN: Status: ACTIVE | Noted: 2022-06-04

## 2022-06-04 LAB
ALBUMIN SERPL-MCNC: 3.5 G/DL (ref 3.6–5.1)
ALBUMIN/GLOB SERPL: 0.9 {RATIO} (ref 1–2.4)
ALP SERPL-CCNC: 55 UNITS/L (ref 45–117)
ALT SERPL-CCNC: 13 UNITS/L
ANION GAP SERPL CALC-SCNC: 11 MMOL/L (ref 7–19)
APTT PPP: 23 SEC (ref 22–30)
AST SERPL-CCNC: 27 UNITS/L
ATRIAL RATE (BPM): 81
BASO STIPL BLD QL SMEAR: PRESENT
BASOPHILS # BLD: 0 K/MCL (ref 0–0.3)
BASOPHILS NFR BLD: 0 %
BILIRUB SERPL-MCNC: 0.6 MG/DL (ref 0.2–1)
BUN SERPL-MCNC: 19 MG/DL (ref 6–20)
BUN/CREAT SERPL: 24 (ref 7–25)
BURR CELLS BLD QL SMEAR: NORMAL
CALCIUM SERPL-MCNC: 9.7 MG/DL (ref 8.4–10.2)
CHLORIDE SERPL-SCNC: 109 MMOL/L (ref 97–110)
CO2 SERPL-SCNC: 24 MMOL/L (ref 21–32)
CREAT SERPL-MCNC: 0.79 MG/DL (ref 0.51–0.95)
DEPRECATED RDW RBC: 39.6 FL (ref 39–50)
EOSINOPHIL # BLD: 0.1 K/MCL (ref 0–0.5)
EOSINOPHIL NFR BLD: 1 %
ERYTHROCYTE [DISTWIDTH] IN BLOOD: 19.7 % (ref 11–15)
FASTING DURATION TIME PATIENT: ABNORMAL H
FLUAV RNA RESP QL NAA+PROBE: NOT DETECTED
FLUBV RNA RESP QL NAA+PROBE: NOT DETECTED
GFR SERPLBLD BASED ON 1.73 SQ M-ARVRAT: 75 ML/MIN
GLOBULIN SER-MCNC: 3.7 G/DL (ref 2–4)
GLUCOSE SERPL-MCNC: 120 MG/DL (ref 70–99)
HCT VFR BLD CALC: 27.3 % (ref 36–46.5)
HGB BLD-MCNC: 8.7 G/DL (ref 12–15.5)
HOWELL-JOLLY BOD BLD QL SMEAR: PRESENT
HYPOCHROMIA BLD QL SMEAR: NORMAL
IMM GRANULOCYTES # BLD AUTO: 0.1 K/MCL (ref 0–0.2)
IMM GRANULOCYTES # BLD: 1 %
INR PPP: 1
LACTATE BLDV-SCNC: 1.5 MMOL/L (ref 0–2)
LIPASE SERPL-CCNC: 134 UNITS/L (ref 73–393)
LYMPHOCYTES # BLD: 1.7 K/MCL (ref 1–4)
LYMPHOCYTES NFR BLD: 19 %
MAGNESIUM SERPL-MCNC: 1.9 MG/DL (ref 1.7–2.4)
MCH RBC QN AUTO: 19.8 PG (ref 26–34)
MCHC RBC AUTO-ENTMCNC: 31.9 G/DL (ref 32–36.5)
MCV RBC AUTO: 62.2 FL (ref 78–100)
MICROCYTES BLD QL SMEAR: NORMAL
MONOCYTES # BLD: 0.8 K/MCL (ref 0.3–0.9)
MONOCYTES NFR BLD: 8 %
NEUTROPHILS # BLD: 6.5 K/MCL (ref 1.8–7.7)
NEUTROPHILS NFR BLD: 71 %
NRBC BLD MANUAL-RTO: 6 /100 WBC
OVALOCYTES BLD QL SMEAR: NORMAL
P AXIS (DEGREES): 43
PLAT MORPH BLD: NORMAL
PLATELET # BLD AUTO: 293 K/MCL (ref 140–450)
POLYCHROMASIA BLD QL SMEAR: NORMAL
POTASSIUM SERPL-SCNC: 4.4 MMOL/L (ref 3.4–5.1)
PR-INTERVAL (MSEC): 140
PROT SERPL-MCNC: 7.2 G/DL (ref 6.4–8.2)
PROTHROMBIN TIME: 10.4 SEC (ref 9.7–11.8)
QRS-INTERVAL (MSEC): 80
QT-INTERVAL (MSEC): 392
QTC: 455
R AXIS (DEGREES): 3
RAINBOW EXTRA TUBES HOLD SPECIMEN: NORMAL
RBC # BLD: 4.39 MIL/MCL (ref 4–5.2)
REPORT TEXT: NORMAL
RSV AG NPH QL IA.RAPID: NOT DETECTED
SARS-COV-2 RNA RESP QL NAA+PROBE: NOT DETECTED
SCHISTOCYTES BLD QL SMEAR: NORMAL
SERVICE CMNT-IMP: NORMAL
SERVICE CMNT-IMP: NORMAL
SODIUM SERPL-SCNC: 140 MMOL/L (ref 135–145)
T AXIS (DEGREES): -3
TARGETS BLD QL SMEAR: NORMAL
TROPONIN I SERPL DL<=0.01 NG/ML-MCNC: 7 NG/L
VENTRICULAR RATE EKG/MIN (BPM): 81
WBC # BLD: 9.1 K/MCL (ref 4.2–11)
WBC MORPH BLD: NORMAL

## 2022-06-04 PROCEDURE — 80053 COMPREHEN METABOLIC PANEL: CPT | Performed by: EMERGENCY MEDICINE

## 2022-06-04 PROCEDURE — 10002807 HB RX 258: Performed by: EMERGENCY MEDICINE

## 2022-06-04 PROCEDURE — 83735 ASSAY OF MAGNESIUM: CPT | Performed by: EMERGENCY MEDICINE

## 2022-06-04 PROCEDURE — 96361 HYDRATE IV INFUSION ADD-ON: CPT

## 2022-06-04 PROCEDURE — 99285 EMERGENCY DEPT VISIT HI MDM: CPT | Performed by: EMERGENCY MEDICINE

## 2022-06-04 PROCEDURE — 84484 ASSAY OF TROPONIN QUANT: CPT | Performed by: EMERGENCY MEDICINE

## 2022-06-04 PROCEDURE — G1004 CDSM NDSC: HCPCS

## 2022-06-04 PROCEDURE — 0241U COVID/FLU/RSV PANEL: CPT | Performed by: EMERGENCY MEDICINE

## 2022-06-04 PROCEDURE — 99285 EMERGENCY DEPT VISIT HI MDM: CPT

## 2022-06-04 PROCEDURE — 74018 RADEX ABDOMEN 1 VIEW: CPT

## 2022-06-04 PROCEDURE — 10002803 HB RX 637: Performed by: INTERNAL MEDICINE

## 2022-06-04 PROCEDURE — 85610 PROTHROMBIN TIME: CPT | Performed by: EMERGENCY MEDICINE

## 2022-06-04 PROCEDURE — 74176 CT ABD & PELVIS W/O CONTRAST: CPT

## 2022-06-04 PROCEDURE — 93010 ELECTROCARDIOGRAM REPORT: CPT | Performed by: INTERNAL MEDICINE

## 2022-06-04 PROCEDURE — C9803 HOPD COVID-19 SPEC COLLECT: HCPCS

## 2022-06-04 PROCEDURE — 10000002 HB ROOM CHARGE MED SURG

## 2022-06-04 PROCEDURE — 96375 TX/PRO/DX INJ NEW DRUG ADDON: CPT

## 2022-06-04 PROCEDURE — 93005 ELECTROCARDIOGRAM TRACING: CPT | Performed by: EMERGENCY MEDICINE

## 2022-06-04 PROCEDURE — 96376 TX/PRO/DX INJ SAME DRUG ADON: CPT

## 2022-06-04 PROCEDURE — 10002805 HB CONTRAST AGENT: Performed by: EMERGENCY MEDICINE

## 2022-06-04 PROCEDURE — 99222 1ST HOSP IP/OBS MODERATE 55: CPT | Performed by: STUDENT IN AN ORGANIZED HEALTH CARE EDUCATION/TRAINING PROGRAM

## 2022-06-04 PROCEDURE — 10002800 HB RX 250 W HCPCS: Performed by: EMERGENCY MEDICINE

## 2022-06-04 PROCEDURE — 85025 COMPLETE CBC W/AUTO DIFF WBC: CPT | Performed by: EMERGENCY MEDICINE

## 2022-06-04 PROCEDURE — 83690 ASSAY OF LIPASE: CPT | Performed by: EMERGENCY MEDICINE

## 2022-06-04 PROCEDURE — 85730 THROMBOPLASTIN TIME PARTIAL: CPT | Performed by: EMERGENCY MEDICINE

## 2022-06-04 PROCEDURE — 96374 THER/PROPH/DIAG INJ IV PUSH: CPT

## 2022-06-04 PROCEDURE — 10002800 HB RX 250 W HCPCS: Performed by: INTERNAL MEDICINE

## 2022-06-04 PROCEDURE — 83605 ASSAY OF LACTIC ACID: CPT | Performed by: STUDENT IN AN ORGANIZED HEALTH CARE EDUCATION/TRAINING PROGRAM

## 2022-06-04 RX ORDER — ONDANSETRON 2 MG/ML
4 INJECTION INTRAMUSCULAR; INTRAVENOUS ONCE
Status: COMPLETED | OUTPATIENT
Start: 2022-06-04 | End: 2022-06-04

## 2022-06-04 RX ORDER — SODIUM CHLORIDE 9 MG/ML
INJECTION, SOLUTION INTRAVENOUS CONTINUOUS
Status: DISCONTINUED | OUTPATIENT
Start: 2022-06-04 | End: 2022-06-04

## 2022-06-04 RX ORDER — ATORVASTATIN CALCIUM 20 MG/1
20 TABLET, FILM COATED ORAL NIGHTLY
Status: DISCONTINUED | OUTPATIENT
Start: 2022-06-04 | End: 2022-06-04

## 2022-06-04 RX ORDER — ONDANSETRON 2 MG/ML
4 INJECTION INTRAMUSCULAR; INTRAVENOUS EVERY 6 HOURS PRN
Status: DISCONTINUED | OUTPATIENT
Start: 2022-06-04 | End: 2022-06-06 | Stop reason: HOSPADM

## 2022-06-04 RX ORDER — ACETAMINOPHEN 325 MG/1
650 TABLET ORAL EVERY 4 HOURS PRN
Status: DISCONTINUED | OUTPATIENT
Start: 2022-06-04 | End: 2022-06-06 | Stop reason: HOSPADM

## 2022-06-04 RX ORDER — DEXTROSE, SODIUM CHLORIDE, SODIUM LACTATE, POTASSIUM CHLORIDE, AND CALCIUM CHLORIDE 5; .6; .31; .03; .02 G/100ML; G/100ML; G/100ML; G/100ML; G/100ML
INJECTION, SOLUTION INTRAVENOUS CONTINUOUS
Status: DISCONTINUED | OUTPATIENT
Start: 2022-06-04 | End: 2022-06-06 | Stop reason: HOSPADM

## 2022-06-04 RX ORDER — HEPARIN SODIUM 5000 [USP'U]/ML
5000 INJECTION, SOLUTION INTRAVENOUS; SUBCUTANEOUS EVERY 8 HOURS SCHEDULED
Status: DISCONTINUED | OUTPATIENT
Start: 2022-06-04 | End: 2022-06-06 | Stop reason: HOSPADM

## 2022-06-04 RX ORDER — GABAPENTIN 300 MG/1
300 CAPSULE ORAL NIGHTLY
Status: DISCONTINUED | OUTPATIENT
Start: 2022-06-04 | End: 2022-06-06 | Stop reason: HOSPADM

## 2022-06-04 RX ORDER — ATORVASTATIN CALCIUM 10 MG/1
10 TABLET, FILM COATED ORAL NIGHTLY
Status: DISCONTINUED | OUTPATIENT
Start: 2022-06-04 | End: 2022-06-06 | Stop reason: HOSPADM

## 2022-06-04 RX ADMIN — DIATRIZOATE MEGLUMINE AND DIATRIZOATE SODIUM 30 ML: 660; 100 LIQUID ORAL; RECTAL at 07:53

## 2022-06-04 RX ADMIN — ONDANSETRON 4 MG: 2 INJECTION INTRAMUSCULAR; INTRAVENOUS at 09:36

## 2022-06-04 RX ADMIN — HEPARIN SODIUM 5000 UNITS: 5000 INJECTION INTRAVENOUS; SUBCUTANEOUS at 21:59

## 2022-06-04 RX ADMIN — ONDANSETRON 4 MG: 2 INJECTION INTRAMUSCULAR; INTRAVENOUS at 11:49

## 2022-06-04 RX ADMIN — GABAPENTIN 300 MG: 300 CAPSULE ORAL at 21:58

## 2022-06-04 RX ADMIN — SODIUM CHLORIDE: 9 INJECTION, SOLUTION INTRAVENOUS at 07:40

## 2022-06-04 RX ADMIN — ATORVASTATIN CALCIUM 10 MG: 10 TABLET, FILM COATED ORAL at 22:31

## 2022-06-04 RX ADMIN — HYDROMORPHONE HYDROCHLORIDE 0.5 MG: 1 INJECTION, SOLUTION INTRAMUSCULAR; INTRAVENOUS; SUBCUTANEOUS at 12:09

## 2022-06-04 RX ADMIN — SODIUM CHLORIDE, SODIUM LACTATE, POTASSIUM CHLORIDE, CALCIUM CHLORIDE AND DEXTROSE MONOHYDRATE: 5; 600; 310; 30; 20 INJECTION, SOLUTION INTRAVENOUS at 11:49

## 2022-06-04 RX ADMIN — ONDANSETRON 4 MG: 2 INJECTION INTRAMUSCULAR; INTRAVENOUS at 07:43

## 2022-06-04 RX ADMIN — HYDROMORPHONE HYDROCHLORIDE 0.5 MG: 1 INJECTION, SOLUTION INTRAMUSCULAR; INTRAVENOUS; SUBCUTANEOUS at 07:47

## 2022-06-04 RX ADMIN — SODIUM CHLORIDE 500 ML: 9 INJECTION, SOLUTION INTRAVENOUS at 07:40

## 2022-06-04 RX ADMIN — MORPHINE SULFATE 2 MG: 4 INJECTION INTRAVENOUS at 09:37

## 2022-06-04 ASSESSMENT — COLUMBIA-SUICIDE SEVERITY RATING SCALE - C-SSRS
2. HAVE YOU ACTUALLY HAD ANY THOUGHTS OF KILLING YOURSELF?: NO
IS THE PATIENT ABLE TO COMPLETE C-SSRS: YES
6. HAVE YOU EVER DONE ANYTHING, STARTED TO DO ANYTHING, OR PREPARED TO DO ANYTHING TO END YOUR LIFE?: NO
1. WITHIN THE PAST MONTH, HAVE YOU WISHED YOU WERE DEAD OR WISHED YOU COULD GO TO SLEEP AND NOT WAKE UP?: NO

## 2022-06-04 ASSESSMENT — COGNITIVE AND FUNCTIONAL STATUS - GENERAL
DO YOU HAVE DIFFICULTY DRESSING OR BATHING: NO
ARE YOU BLIND OR DO YOU HAVE SERIOUS DIFFICULTY SEEING, EVEN WHEN WEARING GLASSES: NO
BECAUSE OF A PHYSICAL, MENTAL, OR EMOTIONAL CONDITION, DO YOU HAVE SERIOUS DIFFICULTY CONCENTRATING, REMEMBERING OR MAKING DECISIONS: NO
BECAUSE OF A PHYSICAL, MENTAL, OR EMOTIONAL CONDITION, DO YOU HAVE DIFFICULTY DOING ERRANDS ALONE: NO
DO YOU HAVE SERIOUS DIFFICULTY WALKING OR CLIMBING STAIRS: NO
ARE YOU DEAF OR DO YOU HAVE SERIOUS DIFFICULTY  HEARING: NO

## 2022-06-04 ASSESSMENT — PATIENT HEALTH QUESTIONNAIRE - PHQ9
CLINICAL INTERPRETATION OF PHQ2 SCORE: NO FURTHER SCREENING NEEDED
SUM OF ALL RESPONSES TO PHQ9 QUESTIONS 1 AND 2: 0
SUM OF ALL RESPONSES TO PHQ9 QUESTIONS 1 AND 2: 0
1. LITTLE INTEREST OR PLEASURE IN DOING THINGS: NOT AT ALL
IS PATIENT ABLE TO COMPLETE PHQ2 OR PHQ9: YES
2. FEELING DOWN, DEPRESSED OR HOPELESS: NOT AT ALL

## 2022-06-04 ASSESSMENT — ENCOUNTER SYMPTOMS
FEVER: 0
SORE THROAT: 0
HEADACHES: 0
WEAKNESS: 0
ABDOMINAL DISTENTION: 0
CHILLS: 0
SHORTNESS OF BREATH: 0
ABDOMINAL PAIN: 1
NAUSEA: 1
DIARRHEA: 0
COUGH: 0
ADENOPATHY: 0
DIAPHORESIS: 0
BRUISES/BLEEDS EASILY: 0
VOMITING: 1
NUMBNESS: 0

## 2022-06-04 ASSESSMENT — ACTIVITIES OF DAILY LIVING (ADL)
ADL_BEFORE_ADMISSION: INDEPENDENT
CHRONIC_PAIN_PRESENT: NO
ADL_SHORT_OF_BREATH: NO
RECENT_DECLINE_ADL: NO
ADL_SCORE: 12

## 2022-06-04 ASSESSMENT — LIFESTYLE VARIABLES
HOW OFTEN DO YOU HAVE A DRINK CONTAINING ALCOHOL: NEVER
AUDIT-C TOTAL SCORE: 0
HOW MANY STANDARD DRINKS CONTAINING ALCOHOL DO YOU HAVE ON A TYPICAL DAY: 0,1 OR 2
ALCOHOL_USE_STATUS: NO OR LOW RISK WITH VALIDATED TOOL
HOW OFTEN DO YOU HAVE 6 OR MORE DRINKS ON ONE OCCASION: NEVER

## 2022-06-04 ASSESSMENT — PAIN SCALES - GENERAL
PAINLEVEL_OUTOF10: 8
PAINLEVEL_OUTOF10: 8
PAINLEVEL_OUTOF10: 0
PAINLEVEL_OUTOF10: 3
PAINLEVEL_OUTOF10: 3
PAINLEVEL_OUTOF10: 8
PAINLEVEL_OUTOF10: 3
PAINLEVEL_OUTOF10: 4
PAINLEVEL_OUTOF10: 0

## 2022-06-05 ENCOUNTER — APPOINTMENT (OUTPATIENT)
Dept: GENERAL RADIOLOGY | Age: 82
DRG: 390 | End: 2022-06-05
Attending: INTERNAL MEDICINE

## 2022-06-05 LAB
ANION GAP SERPL CALC-SCNC: 8 MMOL/L (ref 7–19)
BUN SERPL-MCNC: 15 MG/DL (ref 6–20)
BUN/CREAT SERPL: 20 (ref 7–25)
CALCIUM SERPL-MCNC: 8.5 MG/DL (ref 8.4–10.2)
CHLORIDE SERPL-SCNC: 108 MMOL/L (ref 97–110)
CO2 SERPL-SCNC: 27 MMOL/L (ref 21–32)
CREAT SERPL-MCNC: 0.74 MG/DL (ref 0.51–0.95)
DEPRECATED RDW RBC: 38.8 FL (ref 39–50)
ERYTHROCYTE [DISTWIDTH] IN BLOOD: 18.8 % (ref 11–15)
FASTING DURATION TIME PATIENT: ABNORMAL H
FERRITIN SERPL-MCNC: 226 NG/ML (ref 8–252)
GFR SERPLBLD BASED ON 1.73 SQ M-ARVRAT: 81 ML/MIN
GLUCOSE SERPL-MCNC: 147 MG/DL (ref 70–99)
HCT VFR BLD CALC: 23.5 % (ref 36–46.5)
HGB BLD-MCNC: 7.6 G/DL (ref 12–15.5)
MCH RBC QN AUTO: 20.1 PG (ref 26–34)
MCHC RBC AUTO-ENTMCNC: 32.3 G/DL (ref 32–36.5)
MCV RBC AUTO: 62 FL (ref 78–100)
NRBC BLD MANUAL-RTO: 5 /100 WBC
PLATELET # BLD AUTO: 232 K/MCL (ref 140–450)
POTASSIUM SERPL-SCNC: 3.9 MMOL/L (ref 3.4–5.1)
RBC # BLD: 3.79 MIL/MCL (ref 4–5.2)
SODIUM SERPL-SCNC: 139 MMOL/L (ref 135–145)
WBC # BLD: 5.9 K/MCL (ref 4.2–11)

## 2022-06-05 PROCEDURE — 10000002 HB ROOM CHARGE MED SURG

## 2022-06-05 PROCEDURE — 80048 BASIC METABOLIC PNL TOTAL CA: CPT | Performed by: INTERNAL MEDICINE

## 2022-06-05 PROCEDURE — 82728 ASSAY OF FERRITIN: CPT | Performed by: INTERNAL MEDICINE

## 2022-06-05 PROCEDURE — 36415 COLL VENOUS BLD VENIPUNCTURE: CPT | Performed by: INTERNAL MEDICINE

## 2022-06-05 PROCEDURE — 10002803 HB RX 637: Performed by: INTERNAL MEDICINE

## 2022-06-05 PROCEDURE — 94664 DEMO&/EVAL PT USE INHALER: CPT

## 2022-06-05 PROCEDURE — 85027 COMPLETE CBC AUTOMATED: CPT | Performed by: INTERNAL MEDICINE

## 2022-06-05 PROCEDURE — 71045 X-RAY EXAM CHEST 1 VIEW: CPT

## 2022-06-05 PROCEDURE — 10002800 HB RX 250 W HCPCS: Performed by: INTERNAL MEDICINE

## 2022-06-05 PROCEDURE — 10002803 HB RX 637

## 2022-06-05 PROCEDURE — 94640 AIRWAY INHALATION TREATMENT: CPT

## 2022-06-05 PROCEDURE — 10002019 HB COUNTER RESP ASSESSMENT

## 2022-06-05 PROCEDURE — 10004651 HB RX, NO CHARGE ITEM: Performed by: INTERNAL MEDICINE

## 2022-06-05 RX ORDER — ALBUTEROL SULFATE 90 UG/1
AEROSOL, METERED RESPIRATORY (INHALATION)
Status: COMPLETED
Start: 2022-06-05 | End: 2022-06-05

## 2022-06-05 RX ORDER — FUROSEMIDE 10 MG/ML
20 INJECTION INTRAMUSCULAR; INTRAVENOUS ONCE
Status: COMPLETED | OUTPATIENT
Start: 2022-06-05 | End: 2022-06-05

## 2022-06-05 RX ORDER — ALBUTEROL SULFATE 90 UG/1
2 AEROSOL, METERED RESPIRATORY (INHALATION)
Status: DISCONTINUED | OUTPATIENT
Start: 2022-06-05 | End: 2022-06-06 | Stop reason: HOSPADM

## 2022-06-05 RX ADMIN — ASPIRIN 81 MG: 81 TABLET, COATED ORAL at 21:11

## 2022-06-05 RX ADMIN — GABAPENTIN 300 MG: 300 CAPSULE ORAL at 21:11

## 2022-06-05 RX ADMIN — ALBUTEROL SULFATE 2 PUFF: 90 AEROSOL, METERED RESPIRATORY (INHALATION) at 08:25

## 2022-06-05 RX ADMIN — FUROSEMIDE 20 MG: 10 INJECTION, SOLUTION INTRAMUSCULAR; INTRAVENOUS at 09:00

## 2022-06-05 RX ADMIN — HEPARIN SODIUM 5000 UNITS: 5000 INJECTION INTRAVENOUS; SUBCUTANEOUS at 21:11

## 2022-06-05 RX ADMIN — ATORVASTATIN CALCIUM 10 MG: 10 TABLET, FILM COATED ORAL at 21:11

## 2022-06-05 RX ADMIN — HEPARIN SODIUM 5000 UNITS: 5000 INJECTION INTRAVENOUS; SUBCUTANEOUS at 14:00

## 2022-06-05 RX ADMIN — HEPARIN SODIUM 5000 UNITS: 5000 INJECTION INTRAVENOUS; SUBCUTANEOUS at 05:01

## 2022-06-05 ASSESSMENT — PAIN SCALES - GENERAL
PAINLEVEL_OUTOF10: 0
PAINLEVEL_OUTOF10: 0

## 2022-06-06 ENCOUNTER — CASE MANAGEMENT (OUTPATIENT)
Dept: CARE COORDINATION | Age: 82
End: 2022-06-06

## 2022-06-06 VITALS
HEART RATE: 86 BPM | RESPIRATION RATE: 16 BRPM | BODY MASS INDEX: 27.18 KG/M2 | DIASTOLIC BLOOD PRESSURE: 58 MMHG | SYSTOLIC BLOOD PRESSURE: 115 MMHG | TEMPERATURE: 97.3 F | HEIGHT: 62 IN | OXYGEN SATURATION: 97 % | WEIGHT: 147.71 LBS

## 2022-06-06 PROCEDURE — 10004281 HB COUNTER-STAFF TIME PER 15 MIN

## 2022-06-06 PROCEDURE — 10002800 HB RX 250 W HCPCS: Performed by: INTERNAL MEDICINE

## 2022-06-06 PROCEDURE — 13003287

## 2022-06-06 PROCEDURE — 99231 SBSQ HOSP IP/OBS SF/LOW 25: CPT | Performed by: STUDENT IN AN ORGANIZED HEALTH CARE EDUCATION/TRAINING PROGRAM

## 2022-06-06 RX ADMIN — HEPARIN SODIUM 5000 UNITS: 5000 INJECTION INTRAVENOUS; SUBCUTANEOUS at 05:46

## 2022-06-06 ASSESSMENT — PAIN SCALES - GENERAL: PAINLEVEL_OUTOF10: 0

## 2022-06-07 ENCOUNTER — CASE MANAGEMENT (OUTPATIENT)
Dept: CARE COORDINATION | Age: 82
End: 2022-06-07

## 2022-06-10 ENCOUNTER — EXTERNAL LAB (OUTPATIENT)
Dept: OTHER | Age: 82
End: 2022-06-10

## 2022-06-10 LAB — LAB RESULT: NORMAL

## 2022-06-20 ENCOUNTER — EXTERNAL RECORD (OUTPATIENT)
Dept: HEALTH INFORMATION MANAGEMENT | Facility: OTHER | Age: 82
End: 2022-06-20

## 2022-06-28 ENCOUNTER — EXTERNAL LAB (OUTPATIENT)
Dept: OTHER | Age: 82
End: 2022-06-28

## 2022-06-28 LAB
ABSOLUTE NRBC (AUTO): 0.35 X10'3/MICROL
BASOPHILS # BLD: 0.03 X10'3/MICROL (ref 0–0.11)
BASOPHILS NFR BLD: 0 %
EOSINOPHIL # BLD: 0.1 X10'3/MICROL (ref 0.04–0.32)
EOSINOPHIL NFR BLD: 2 %
ERYTHROCYTE [DISTWIDTH] IN BLOOD: 19.9 % (ref 11.5–14.5)
HCT VFR BLD CALC: 26.6 % (ref 38–50)
HGB BLD-MCNC: 8.2 G/DL (ref 12.1–16.4)
LAB RESULT: NORMAL
LYMPHOCYTES # BLD: 2.04 X10'3/MICROL (ref 1.1–3.3)
LYMPHOCYTES NFR BLD: 33 %
MCH RBC QN AUTO: 19.6 PG (ref 26–34)
MCHC RBC AUTO-ENTMCNC: 30.8 G/DL (ref 30.6–37)
MCV RBC AUTO: 63.5 FL (ref 79–98)
MONOCYTES # BLD: 0.65 X10'3/MICROL (ref 0.15–0.58)
MONOCYTES NFR BLD: 10 %
MPV (OFFPRE2): ABNORMAL
NEUTROPHILS # BLD: 3.4 X10'3/MICROL (ref 1.8–9)
NEUTROPHILS NFR BLD: 54 %
NRBC BLD MANUAL-RTO: 5.6 %
PLATELET # BLD: 281 X10'3/MICROL (ref 125–400)
RBC # BLD: 4.19 X10'6/MICROL (ref 4–5.6)
WBC # BLD: 6.3 X10'3/MICROL (ref 4.5–11)

## 2022-07-26 ENCOUNTER — EXTERNAL RECORD (OUTPATIENT)
Dept: HEALTH INFORMATION MANAGEMENT | Facility: OTHER | Age: 82
End: 2022-07-26

## 2022-07-26 ENCOUNTER — EXTERNAL LAB (OUTPATIENT)
Dept: OTHER | Age: 82
End: 2022-07-26

## 2022-07-26 LAB
ABSOLUTE NRBC (AUTO): 0.36 X10'3/MICROL
ALBUMIN SERPL-MCNC: 4 G/DL (ref 3.5–5.7)
ALP SERPL-CCNC: 38 UNITS/L (ref 34–104)
ALT SERPL-CCNC: 6 UNITS/L (ref 7–52)
ANION GAP SERPL CALC-SCNC: 8 MMOL/L (ref 6.2–14.7)
AST SERPL-CCNC: 17 UNITS/L (ref 13–39)
BASOPHILS # BLD: 0.04 X10'3/MICROL (ref 0–0.11)
BASOPHILS NFR BLD: 1 %
BILIRUB SERPL-MCNC: 0.8 MG/DL (ref 0.3–1)
BUN SERPL-MCNC: 15 MG/DL (ref 7–25)
CALCIUM SERPL-MCNC: 9.8 MG/DL (ref 8.6–10.4)
CHLORIDE SERPL-SCNC: 107 MEQ/L (ref 98–107)
CO2 SERPL-SCNC: 25 MEQ/L (ref 21–31)
CREAT SERPL-MCNC: 0.91 MG/DL (ref 0.6–1.2)
EOSINOPHIL # BLD: 0.11 X10'3/MICROL (ref 0.04–0.32)
EOSINOPHIL NFR BLD: 2 %
ERYTHROCYTE [DISTWIDTH] IN BLOOD: 19.9 % (ref 11.5–14.5)
EST CRCL: ABNORMAL ML/MIN
GFR SERPLBLD SCHWARTZ-ARVRAT: 62.9 ML/MIN/1.73 M2
GLUCOSE SERPL-MCNC: 93 MG/DL (ref 70–99)
HCT VFR BLD CALC: 27 % (ref 38–50)
HGB BLD-MCNC: 8.5 G/DL (ref 12.1–16.4)
LENGTH OF FAST TIME PATIENT: ABNORMAL H
LYMPHOCYTES # BLD: 1.81 X10'3/MICROL (ref 1.1–3.3)
LYMPHOCYTES NFR BLD: 30 %
MCH RBC QN AUTO: 19.9 PG (ref 26–34)
MCHC RBC AUTO-ENTMCNC: 31.5 G/DL (ref 30.6–37)
MCV RBC AUTO: 63.2 FL (ref 79–98)
MONOCYTES # BLD: 0.62 X10'3/MICROL (ref 0.15–0.58)
MONOCYTES NFR BLD: 10 %
MPV (OFFPRE2): ABNORMAL
NEUTROPHILS # BLD: 3.3 X10'3/MICROL (ref 1.8–9)
NEUTROPHILS NFR BLD: 56 %
NRBC BLD MANUAL-RTO: 6.1 %
PLATELET # BLD: 260 X10'3/MICROL (ref 125–400)
POTASSIUM SERPL-SCNC: 4.6 MMOL/L (ref 3.5–5.1)
PROT SERPL-MCNC: 6.7 G/DL (ref 6.4–8.9)
RBC # BLD: 4.27 X10'6/MICROL (ref 4–5.6)
SODIUM SERPL-SCNC: 140 MEQ/L (ref 133–144)
WBC # BLD: 6 X10'3/MICROL (ref 4.5–11)

## 2022-08-23 ENCOUNTER — EXTERNAL LAB (OUTPATIENT)
Dept: OTHER | Age: 82
End: 2022-08-23

## 2022-08-23 LAB — LAB RESULT: NORMAL

## 2022-09-20 ENCOUNTER — EXTERNAL LAB (OUTPATIENT)
Dept: OTHER | Age: 82
End: 2022-09-20

## 2022-09-20 LAB
ABSOLUTE NRBC (AUTO): 0.37 X10'3/MICROL
BASOPHILS # BLD: 0.02 X10'3/MICROL (ref 0–0.11)
BASOPHILS NFR BLD: 0 %
EOSINOPHIL # BLD: 0.11 X10'3/MICROL (ref 0.04–0.32)
EOSINOPHIL NFR BLD: 2 %
ERYTHROCYTE [DISTWIDTH] IN BLOOD: 20 % (ref 11.5–14.5)
HCT VFR BLD CALC: 24.2 % (ref 38–50)
HGB BLD-MCNC: 7.8 G/DL (ref 12.1–16.4)
LYMPHOCYTES # BLD: 1.88 X10'3/MICROL (ref 1.1–3.3)
LYMPHOCYTES NFR BLD: 27 %
MCH RBC QN AUTO: 19.9 PG (ref 26–34)
MCHC RBC AUTO-ENTMCNC: 32.2 G/DL (ref 30.6–37)
MCV RBC AUTO: 61.7 FL (ref 79–98)
MONOCYTES # BLD: 0.72 X10'3/MICROL (ref 0.15–0.58)
MONOCYTES NFR BLD: 10 %
MPV (OFFPRE2): ABNORMAL
NEUTROPHILS # BLD: 4 X10'3/MICROL (ref 1.8–9)
NEUTROPHILS NFR BLD: 59 %
NRBC BLD MANUAL-RTO: 5.4 %
PLAT MORPH BLD: ABNORMAL
PLATELET # BLD: 283 X10'3/MICROL (ref 125–400)
RBC # BLD: 3.92 X10'6/MICROL (ref 4–5.6)
RBC MORPH BLD: ABNORMAL
WBC # BLD: 6.8 X10'3/MICROL (ref 4.5–11)

## 2022-09-27 ENCOUNTER — EXTERNAL LAB (OUTPATIENT)
Dept: OTHER | Age: 82
End: 2022-09-27

## 2022-09-27 LAB
ABSOLUTE NRBC (AUTO): 0.49 X10'3/MICROL
ERYTHROCYTE [DISTWIDTH] IN BLOOD: 19.7 % (ref 11.5–14.5)
HCT VFR BLD CALC: 25.5 % (ref 38–50)
HGB BLD-MCNC: 8.2 G/DL (ref 12.1–16.4)
MCH RBC QN AUTO: 20 PG (ref 26–34)
MCHC RBC AUTO-ENTMCNC: 32.2 G/DL (ref 30.6–37)
MCV RBC AUTO: 62.3 FL (ref 79–98)
MPV (OFFPRE2): ABNORMAL
NRBC BLD MANUAL-RTO: 8.4 %
PLATELET # BLD: 290 X10'3/MICROL (ref 125–400)
RBC # BLD: 4.09 X10'6/MICROL (ref 4–5.6)
WBC # BLD: 5.8 X10'3/MICROL (ref 4.5–11)

## 2022-10-03 ENCOUNTER — OFFICE VISIT (OUTPATIENT)
Dept: HEMATOLOGY/ONCOLOGY | Age: 82
End: 2022-10-03
Attending: INTERNAL MEDICINE

## 2022-10-03 ENCOUNTER — NURSE ONLY (OUTPATIENT)
Dept: HEMATOLOGY/ONCOLOGY | Age: 82
End: 2022-10-03
Attending: INTERNAL MEDICINE

## 2022-10-03 VITALS
HEIGHT: 62 IN | HEART RATE: 76 BPM | TEMPERATURE: 97.9 F | WEIGHT: 151.9 LBS | RESPIRATION RATE: 18 BRPM | SYSTOLIC BLOOD PRESSURE: 135 MMHG | OXYGEN SATURATION: 99 % | BODY MASS INDEX: 27.95 KG/M2 | DIASTOLIC BLOOD PRESSURE: 87 MMHG

## 2022-10-03 DIAGNOSIS — Z85.43 HISTORY OF OVARIAN CANCER: ICD-10-CM

## 2022-10-03 DIAGNOSIS — D63.8 ANEMIA, CHRONIC DISEASE: Primary | ICD-10-CM

## 2022-10-03 LAB
BASOPHILS # BLD: 0 K/MCL (ref 0–0.3)
BASOPHILS NFR BLD: 1 %
DEPRECATED RDW RBC: 42 FL (ref 39–50)
EOSINOPHIL # BLD: 0.1 K/MCL (ref 0–0.5)
EOSINOPHIL NFR BLD: 2 %
ERYTHROCYTE [DISTWIDTH] IN BLOOD: 19.9 % (ref 11–15)
HCT VFR BLD CALC: 27.5 % (ref 36–46.5)
HGB BLD-MCNC: 8.7 G/DL (ref 12–15.5)
IMM GRANULOCYTES # BLD AUTO: 0 K/MCL (ref 0–0.2)
IMM GRANULOCYTES # BLD: 1 %
IRON SATN MFR SERPL: 36 % (ref 15–45)
IRON SERPL-MCNC: 101 MCG/DL (ref 50–170)
LYMPHOCYTES # BLD: 2.1 K/MCL (ref 1–4)
LYMPHOCYTES NFR BLD: 36 %
MCH RBC QN AUTO: 20.1 PG (ref 26–34)
MCHC RBC AUTO-ENTMCNC: 31.6 G/DL (ref 32–36.5)
MCV RBC AUTO: 63.5 FL (ref 78–100)
MONOCYTES # BLD: 0.6 K/MCL (ref 0.3–0.9)
MONOCYTES NFR BLD: 10 %
NEUTROPHILS # BLD: 2.9 K/MCL (ref 1.8–7.7)
NEUTROPHILS NFR BLD: 50 %
NRBC BLD MANUAL-RTO: 8 /100 WBC
PLATELET # BLD AUTO: 308 K/MCL (ref 140–450)
RBC # BLD: 4.33 MIL/MCL (ref 4–5.2)
TIBC SERPL-MCNC: 279 MCG/DL (ref 250–450)
WBC # BLD: 5.8 K/MCL (ref 4.2–11)

## 2022-10-03 PROCEDURE — 83540 ASSAY OF IRON: CPT | Performed by: INTERNAL MEDICINE

## 2022-10-03 PROCEDURE — 99202 OFFICE O/P NEW SF 15 MIN: CPT

## 2022-10-03 PROCEDURE — 99204 OFFICE O/P NEW MOD 45 MIN: CPT | Performed by: INTERNAL MEDICINE

## 2022-10-03 PROCEDURE — 85025 COMPLETE CBC W/AUTO DIFF WBC: CPT | Performed by: INTERNAL MEDICINE

## 2022-10-03 PROCEDURE — 36415 COLL VENOUS BLD VENIPUNCTURE: CPT

## 2022-10-03 PROCEDURE — 82668 ASSAY OF ERYTHROPOIETIN: CPT | Performed by: INTERNAL MEDICINE

## 2022-10-03 ASSESSMENT — ENCOUNTER SYMPTOMS
CONSTITUTIONAL NEGATIVE: 1
EYES NEGATIVE: 1
PSYCHIATRIC NEGATIVE: 1
HEMATOLOGIC/LYMPHATIC NEGATIVE: 1
GASTROINTESTINAL NEGATIVE: 1
ALLERGIC/IMMUNOLOGIC NEGATIVE: 1
ENDOCRINE NEGATIVE: 1
RESPIRATORY NEGATIVE: 1
NEUROLOGICAL NEGATIVE: 1

## 2022-10-03 ASSESSMENT — PATIENT HEALTH QUESTIONNAIRE - PHQ9
CLINICAL INTERPRETATION OF PHQ2 SCORE: NO FURTHER SCREENING NEEDED
SUM OF ALL RESPONSES TO PHQ9 QUESTIONS 1 AND 2: 0
SUM OF ALL RESPONSES TO PHQ9 QUESTIONS 1 AND 2: 0
1. LITTLE INTEREST OR PLEASURE IN DOING THINGS: NOT AT ALL
2. FEELING DOWN, DEPRESSED OR HOPELESS: NOT AT ALL

## 2022-10-03 ASSESSMENT — PAIN SCALES - GENERAL: PAINLEVEL: 0

## 2022-10-04 DIAGNOSIS — D63.8 ANEMIA, CHRONIC DISEASE: Primary | ICD-10-CM

## 2022-10-04 LAB — EPO SERPL-ACNC: 17.6 MUNITS/ML (ref 4–27)

## 2022-10-31 DIAGNOSIS — D63.8 ANEMIA, CHRONIC DISEASE: Primary | ICD-10-CM

## 2022-10-31 RX ORDER — ALBUTEROL SULFATE 90 UG/1
2 AEROSOL, METERED RESPIRATORY (INHALATION)
Status: CANCELLED | OUTPATIENT
Start: 2022-10-31

## 2022-10-31 RX ORDER — METHYLPREDNISOLONE SODIUM SUCCINATE 125 MG/2ML
125 INJECTION, POWDER, LYOPHILIZED, FOR SOLUTION INTRAMUSCULAR; INTRAVENOUS
Status: CANCELLED | OUTPATIENT
Start: 2022-10-31

## 2022-10-31 RX ORDER — DIPHENHYDRAMINE HYDROCHLORIDE 50 MG/ML
50 INJECTION INTRAMUSCULAR; INTRAVENOUS
Status: CANCELLED
Start: 2022-10-31

## 2022-10-31 RX ORDER — FAMOTIDINE 10 MG/ML
20 INJECTION, SOLUTION INTRAVENOUS
Status: CANCELLED | OUTPATIENT
Start: 2022-10-31

## 2022-11-15 RX ORDER — FAMOTIDINE 10 MG/ML
20 INJECTION, SOLUTION INTRAVENOUS
Status: CANCELLED | OUTPATIENT
Start: 2022-11-16

## 2022-11-15 RX ORDER — METHYLPREDNISOLONE SODIUM SUCCINATE 125 MG/2ML
125 INJECTION, POWDER, LYOPHILIZED, FOR SOLUTION INTRAMUSCULAR; INTRAVENOUS
Status: CANCELLED | OUTPATIENT
Start: 2022-11-16

## 2022-11-15 RX ORDER — DIPHENHYDRAMINE HYDROCHLORIDE 50 MG/ML
50 INJECTION INTRAMUSCULAR; INTRAVENOUS
Status: CANCELLED
Start: 2022-11-16

## 2022-11-15 RX ORDER — ALBUTEROL SULFATE 90 UG/1
2 AEROSOL, METERED RESPIRATORY (INHALATION)
Status: CANCELLED | OUTPATIENT
Start: 2022-11-16

## 2022-11-16 ENCOUNTER — LAB SERVICES (OUTPATIENT)
Dept: LAB | Age: 82
End: 2022-11-16

## 2022-11-16 ENCOUNTER — HOSPITAL ENCOUNTER (OUTPATIENT)
Dept: INFUSION THERAPY | Age: 82
Discharge: STILL ADMITTED OR ADMINISTRATIVE | End: 2022-11-16
Attending: INTERNAL MEDICINE

## 2022-11-16 VITALS
RESPIRATION RATE: 16 BRPM | TEMPERATURE: 98.2 F | HEART RATE: 85 BPM | SYSTOLIC BLOOD PRESSURE: 97 MMHG | OXYGEN SATURATION: 96 % | WEIGHT: 152.12 LBS | DIASTOLIC BLOOD PRESSURE: 57 MMHG | BODY MASS INDEX: 27.82 KG/M2

## 2022-11-16 DIAGNOSIS — D63.8 ANEMIA, CHRONIC DISEASE: ICD-10-CM

## 2022-11-16 DIAGNOSIS — D63.8 ANEMIA, CHRONIC DISEASE: Primary | ICD-10-CM

## 2022-11-16 LAB
ALBUMIN SERPL-MCNC: 3.8 G/DL (ref 3.6–5.1)
ALBUMIN/GLOB SERPL: 1 {RATIO} (ref 1–2.4)
ALP SERPL-CCNC: 51 UNITS/L (ref 45–117)
ALT SERPL-CCNC: 10 UNITS/L
ANION GAP SERPL CALC-SCNC: 11 MMOL/L (ref 7–19)
AST SERPL-CCNC: 14 UNITS/L
BASOPHILS # BLD: 0 K/MCL (ref 0–0.3)
BASOPHILS NFR BLD: 1 %
BILIRUB SERPL-MCNC: 0.7 MG/DL (ref 0.2–1)
BUN SERPL-MCNC: 18 MG/DL (ref 6–20)
BUN/CREAT SERPL: 21 (ref 7–25)
CALCIUM SERPL-MCNC: 9.1 MG/DL (ref 8.4–10.2)
CHLORIDE SERPL-SCNC: 109 MMOL/L (ref 97–110)
CO2 SERPL-SCNC: 25 MMOL/L (ref 21–32)
CREAT SERPL-MCNC: 0.86 MG/DL (ref 0.51–0.95)
DEPRECATED RDW RBC: 40.9 FL (ref 39–50)
EOSINOPHIL # BLD: 0.1 K/MCL (ref 0–0.5)
EOSINOPHIL NFR BLD: 1 %
ERYTHROCYTE [DISTWIDTH] IN BLOOD: 19.4 % (ref 11–15)
FASTING DURATION TIME PATIENT: NORMAL H
FERRITIN SERPL-MCNC: 179 NG/ML (ref 8–252)
GFR SERPLBLD BASED ON 1.73 SQ M-ARVRAT: 67 ML/MIN
GLOBULIN SER-MCNC: 3.7 G/DL (ref 2–4)
GLUCOSE SERPL-MCNC: 99 MG/DL (ref 70–99)
HCT VFR BLD CALC: 26.5 % (ref 36–46.5)
HGB BLD-MCNC: 8.5 G/DL (ref 12–15.5)
IMM GRANULOCYTES # BLD AUTO: 0 K/MCL (ref 0–0.2)
IMM GRANULOCYTES # BLD: 1 %
IRON SATN MFR SERPL: 44 % (ref 15–45)
IRON SERPL-MCNC: 112 MCG/DL (ref 50–170)
LYMPHOCYTES # BLD: 1.8 K/MCL (ref 1–4)
LYMPHOCYTES NFR BLD: 30 %
MCH RBC QN AUTO: 20.4 PG (ref 26–34)
MCHC RBC AUTO-ENTMCNC: 32.1 G/DL (ref 32–36.5)
MCV RBC AUTO: 63.5 FL (ref 78–100)
MONOCYTES # BLD: 0.7 K/MCL (ref 0.3–0.9)
MONOCYTES NFR BLD: 11 %
NEUTROPHILS # BLD: 3.6 K/MCL (ref 1.8–7.7)
NEUTROPHILS NFR BLD: 56 %
NRBC BLD MANUAL-RTO: 6 /100 WBC
PLATELET # BLD AUTO: 271 K/MCL (ref 140–450)
POTASSIUM SERPL-SCNC: 4.7 MMOL/L (ref 3.4–5.1)
PROT SERPL-MCNC: 7.5 G/DL (ref 6.4–8.2)
RBC # BLD: 4.17 MIL/MCL (ref 4–5.2)
SODIUM SERPL-SCNC: 140 MMOL/L (ref 135–145)
TIBC SERPL-MCNC: 256 MCG/DL (ref 250–450)
WBC # BLD: 6.2 K/MCL (ref 4.2–11)

## 2022-11-16 PROCEDURE — 83550 IRON BINDING TEST: CPT | Performed by: CLINICAL MEDICAL LABORATORY

## 2022-11-16 PROCEDURE — 83540 ASSAY OF IRON: CPT | Performed by: CLINICAL MEDICAL LABORATORY

## 2022-11-16 PROCEDURE — 36415 COLL VENOUS BLD VENIPUNCTURE: CPT | Performed by: CLINICAL MEDICAL LABORATORY

## 2022-11-16 PROCEDURE — 85025 COMPLETE CBC W/AUTO DIFF WBC: CPT | Performed by: CLINICAL MEDICAL LABORATORY

## 2022-11-16 PROCEDURE — 80053 COMPREHEN METABOLIC PANEL: CPT | Performed by: CLINICAL MEDICAL LABORATORY

## 2022-11-16 PROCEDURE — 96372 THER/PROPH/DIAG INJ SC/IM: CPT

## 2022-11-16 PROCEDURE — 10002800 HB RX 250 W HCPCS: Performed by: INTERNAL MEDICINE

## 2022-11-16 PROCEDURE — 82728 ASSAY OF FERRITIN: CPT | Performed by: CLINICAL MEDICAL LABORATORY

## 2022-11-16 RX ADMIN — EPOETIN ALFA-EPBX 20000 UNITS: 10000 INJECTION, SOLUTION INTRAVENOUS; SUBCUTANEOUS at 13:23

## 2022-11-16 ASSESSMENT — PAIN SCALES - GENERAL: PAINLEVEL_OUTOF10: 0

## 2022-12-06 ENCOUNTER — CLINICAL ABSTRACT (OUTPATIENT)
Dept: ONCOLOGY | Age: 82
End: 2022-12-06

## 2022-12-06 DIAGNOSIS — D63.8 ANEMIA, CHRONIC DISEASE: Primary | ICD-10-CM

## 2022-12-06 RX ORDER — DIPHENHYDRAMINE HYDROCHLORIDE 50 MG/ML
50 INJECTION INTRAMUSCULAR; INTRAVENOUS
Status: CANCELLED
Start: 2022-12-14

## 2022-12-06 RX ORDER — ALBUTEROL SULFATE 90 UG/1
2 AEROSOL, METERED RESPIRATORY (INHALATION)
Status: CANCELLED | OUTPATIENT
Start: 2022-12-14

## 2022-12-06 RX ORDER — METHYLPREDNISOLONE SODIUM SUCCINATE 125 MG/2ML
125 INJECTION, POWDER, LYOPHILIZED, FOR SOLUTION INTRAMUSCULAR; INTRAVENOUS
Status: CANCELLED | OUTPATIENT
Start: 2022-12-14

## 2022-12-06 RX ORDER — FAMOTIDINE 10 MG/ML
20 INJECTION, SOLUTION INTRAVENOUS
Status: CANCELLED | OUTPATIENT
Start: 2022-12-14

## 2022-12-07 ENCOUNTER — APPOINTMENT (OUTPATIENT)
Dept: INFUSION THERAPY | Age: 82
End: 2022-12-07
Attending: INTERNAL MEDICINE

## 2022-12-14 ENCOUNTER — HOSPITAL ENCOUNTER (OUTPATIENT)
Dept: INFUSION THERAPY | Age: 82
Discharge: STILL ADMITTED OR ADMINISTRATIVE | End: 2022-12-14
Attending: INTERNAL MEDICINE

## 2022-12-14 ENCOUNTER — LAB SERVICES (OUTPATIENT)
Dept: LAB | Age: 82
End: 2022-12-14

## 2022-12-14 VITALS
DIASTOLIC BLOOD PRESSURE: 69 MMHG | TEMPERATURE: 97.5 F | SYSTOLIC BLOOD PRESSURE: 113 MMHG | HEART RATE: 77 BPM | BODY MASS INDEX: 27.58 KG/M2 | WEIGHT: 150.79 LBS | OXYGEN SATURATION: 99 %

## 2022-12-14 DIAGNOSIS — D63.8 ANEMIA, CHRONIC DISEASE: Primary | ICD-10-CM

## 2022-12-14 DIAGNOSIS — D63.8 ANEMIA, CHRONIC DISEASE: ICD-10-CM

## 2022-12-14 LAB
BASOPHILS # BLD: 0 K/MCL (ref 0–0.3)
BASOPHILS NFR BLD: 1 %
DEPRECATED RDW RBC: 40 FL (ref 39–50)
EOSINOPHIL # BLD: 0.1 K/MCL (ref 0–0.5)
EOSINOPHIL NFR BLD: 2 %
ERYTHROCYTE [DISTWIDTH] IN BLOOD: 19.5 % (ref 11–15)
HCT VFR BLD CALC: 28.6 % (ref 36–46.5)
HGB BLD-MCNC: 9.1 G/DL (ref 12–15.5)
IMM GRANULOCYTES # BLD AUTO: 0 K/MCL (ref 0–0.2)
IMM GRANULOCYTES # BLD: 0 %
LYMPHOCYTES # BLD: 1.9 K/MCL (ref 1–4)
LYMPHOCYTES NFR BLD: 34 %
MCH RBC QN AUTO: 20 PG (ref 26–34)
MCHC RBC AUTO-ENTMCNC: 31.8 G/DL (ref 32–36.5)
MCV RBC AUTO: 62.9 FL (ref 78–100)
MONOCYTES # BLD: 0.5 K/MCL (ref 0.3–0.9)
MONOCYTES NFR BLD: 9 %
NEUTROPHILS # BLD: 2.9 K/MCL (ref 1.8–7.7)
NEUTROPHILS NFR BLD: 54 %
NRBC BLD MANUAL-RTO: 4 /100 WBC
PLATELET # BLD AUTO: 277 K/MCL (ref 140–450)
RBC # BLD: 4.55 MIL/MCL (ref 4–5.2)
WBC # BLD: 5.5 K/MCL (ref 4.2–11)

## 2022-12-14 PROCEDURE — 36415 COLL VENOUS BLD VENIPUNCTURE: CPT | Performed by: CLINICAL MEDICAL LABORATORY

## 2022-12-14 PROCEDURE — 85025 COMPLETE CBC W/AUTO DIFF WBC: CPT | Performed by: CLINICAL MEDICAL LABORATORY

## 2022-12-14 PROCEDURE — 10002800 HB RX 250 W HCPCS: Performed by: INTERNAL MEDICINE

## 2022-12-14 PROCEDURE — 96372 THER/PROPH/DIAG INJ SC/IM: CPT

## 2022-12-14 RX ADMIN — EPOETIN ALFA-EPBX 20000 UNITS: 10000 INJECTION, SOLUTION INTRAVENOUS; SUBCUTANEOUS at 13:33

## 2022-12-14 ASSESSMENT — PAIN SCALES - GENERAL: PAINLEVEL_OUTOF10: 0

## 2023-01-09 ENCOUNTER — CLINICAL ABSTRACT (OUTPATIENT)
Dept: HEMATOLOGY/ONCOLOGY | Age: 83
End: 2023-01-09

## 2023-01-09 DIAGNOSIS — D63.8 ANEMIA, CHRONIC DISEASE: Primary | ICD-10-CM

## 2023-01-09 RX ORDER — FAMOTIDINE 10 MG/ML
20 INJECTION, SOLUTION INTRAVENOUS
Status: CANCELLED | OUTPATIENT
Start: 2023-01-11

## 2023-01-09 RX ORDER — METHYLPREDNISOLONE SODIUM SUCCINATE 125 MG/2ML
125 INJECTION, POWDER, LYOPHILIZED, FOR SOLUTION INTRAMUSCULAR; INTRAVENOUS
Status: CANCELLED | OUTPATIENT
Start: 2023-01-11

## 2023-01-09 RX ORDER — DIPHENHYDRAMINE HYDROCHLORIDE 50 MG/ML
50 INJECTION INTRAMUSCULAR; INTRAVENOUS
Status: CANCELLED
Start: 2023-01-11

## 2023-01-09 RX ORDER — ALBUTEROL SULFATE 90 UG/1
2 AEROSOL, METERED RESPIRATORY (INHALATION)
Status: CANCELLED | OUTPATIENT
Start: 2023-01-11

## 2023-01-11 ENCOUNTER — HOSPITAL ENCOUNTER (OUTPATIENT)
Dept: INFUSION THERAPY | Age: 83
Discharge: STILL ADMITTED OR ADMINISTRATIVE | End: 2023-01-11
Attending: INTERNAL MEDICINE

## 2023-01-11 ENCOUNTER — LAB SERVICES (OUTPATIENT)
Dept: LAB | Age: 83
End: 2023-01-11

## 2023-01-11 VITALS
TEMPERATURE: 96.8 F | OXYGEN SATURATION: 100 % | DIASTOLIC BLOOD PRESSURE: 79 MMHG | SYSTOLIC BLOOD PRESSURE: 114 MMHG | BODY MASS INDEX: 27.54 KG/M2 | WEIGHT: 150.57 LBS | HEART RATE: 75 BPM

## 2023-01-11 DIAGNOSIS — D63.8 ANEMIA, CHRONIC DISEASE: ICD-10-CM

## 2023-01-11 DIAGNOSIS — D63.8 ANEMIA, CHRONIC DISEASE: Primary | ICD-10-CM

## 2023-01-11 LAB
ALBUMIN SERPL-MCNC: 3.8 G/DL (ref 3.6–5.1)
ALBUMIN/GLOB SERPL: 1.1 {RATIO} (ref 1–2.4)
ALP SERPL-CCNC: 56 UNITS/L (ref 45–117)
ALT SERPL-CCNC: 14 UNITS/L
ANION GAP SERPL CALC-SCNC: 7 MMOL/L (ref 7–19)
AST SERPL-CCNC: 18 UNITS/L
BASOPHILS # BLD: 0.1 K/MCL (ref 0–0.3)
BASOPHILS NFR BLD: 1 %
BILIRUB SERPL-MCNC: 0.7 MG/DL (ref 0.2–1)
BUN SERPL-MCNC: 18 MG/DL (ref 6–20)
BUN/CREAT SERPL: 19 (ref 7–25)
CALCIUM SERPL-MCNC: 10 MG/DL (ref 8.4–10.2)
CHLORIDE SERPL-SCNC: 109 MMOL/L (ref 97–110)
CO2 SERPL-SCNC: 27 MMOL/L (ref 21–32)
CREAT SERPL-MCNC: 0.94 MG/DL (ref 0.51–0.95)
DEPRECATED RDW RBC: 39 FL (ref 39–50)
EOSINOPHIL # BLD: 0.1 K/MCL (ref 0–0.5)
EOSINOPHIL NFR BLD: 1 %
ERYTHROCYTE [DISTWIDTH] IN BLOOD: 18.5 % (ref 11–15)
FASTING DURATION TIME PATIENT: ABNORMAL H
GFR SERPLBLD BASED ON 1.73 SQ M-ARVRAT: 60 ML/MIN
GLOBULIN SER-MCNC: 3.4 G/DL (ref 2–4)
GLUCOSE SERPL-MCNC: 102 MG/DL (ref 70–99)
HCT VFR BLD CALC: 29.7 % (ref 36–46.5)
HGB BLD-MCNC: 9.4 G/DL (ref 12–15.5)
IMM GRANULOCYTES # BLD AUTO: 0 K/MCL (ref 0–0.2)
IMM GRANULOCYTES # BLD: 0 %
IRON SERPL-MCNC: 105 MCG/DL (ref 50–170)
LYMPHOCYTES # BLD: 2.3 K/MCL (ref 1–4)
LYMPHOCYTES NFR BLD: 34 %
MCH RBC QN AUTO: 20 PG (ref 26–34)
MCHC RBC AUTO-ENTMCNC: 31.6 G/DL (ref 32–36.5)
MCV RBC AUTO: 63.2 FL (ref 78–100)
MONOCYTES # BLD: 0.6 K/MCL (ref 0.3–0.9)
MONOCYTES NFR BLD: 9 %
NEUTROPHILS # BLD: 3.6 K/MCL (ref 1.8–7.7)
NEUTROPHILS NFR BLD: 55 %
NRBC BLD MANUAL-RTO: 3 /100 WBC
PLATELET # BLD AUTO: 289 K/MCL (ref 140–450)
POTASSIUM SERPL-SCNC: 4.7 MMOL/L (ref 3.4–5.1)
PROT SERPL-MCNC: 7.2 G/DL (ref 6.4–8.2)
RBC # BLD: 4.7 MIL/MCL (ref 4–5.2)
SODIUM SERPL-SCNC: 138 MMOL/L (ref 135–145)
WBC # BLD: 6.7 K/MCL (ref 4.2–11)

## 2023-01-11 PROCEDURE — 85025 COMPLETE CBC W/AUTO DIFF WBC: CPT | Performed by: CLINICAL MEDICAL LABORATORY

## 2023-01-11 PROCEDURE — 96372 THER/PROPH/DIAG INJ SC/IM: CPT

## 2023-01-11 PROCEDURE — 10002800 HB RX 250 W HCPCS: Performed by: INTERNAL MEDICINE

## 2023-01-11 PROCEDURE — 36415 COLL VENOUS BLD VENIPUNCTURE: CPT | Performed by: CLINICAL MEDICAL LABORATORY

## 2023-01-11 PROCEDURE — 83540 ASSAY OF IRON: CPT | Performed by: CLINICAL MEDICAL LABORATORY

## 2023-01-11 PROCEDURE — 80053 COMPREHEN METABOLIC PANEL: CPT | Performed by: CLINICAL MEDICAL LABORATORY

## 2023-01-11 RX ADMIN — EPOETIN ALFA-EPBX 20000 UNITS: 10000 INJECTION, SOLUTION INTRAVENOUS; SUBCUTANEOUS at 14:07

## 2023-01-11 ASSESSMENT — PAIN SCALES - GENERAL: PAINLEVEL_OUTOF10: 0

## 2023-01-12 ENCOUNTER — HOSPITAL ENCOUNTER (OUTPATIENT)
Age: 83
Setting detail: OBSERVATION
Discharge: HOME | End: 2023-01-13
Attending: STUDENT IN AN ORGANIZED HEALTH CARE EDUCATION/TRAINING PROGRAM | Admitting: INTERNAL MEDICINE

## 2023-01-12 ENCOUNTER — APPOINTMENT (OUTPATIENT)
Dept: CT IMAGING | Age: 83
End: 2023-01-12

## 2023-01-12 DIAGNOSIS — R10.84 GENERALIZED ABDOMINAL PAIN: Primary | ICD-10-CM

## 2023-01-12 DIAGNOSIS — K56.600 PARTIAL SMALL BOWEL OBSTRUCTION (CMD): ICD-10-CM

## 2023-01-12 LAB
ABO + RH BLD: NORMAL
ALBUMIN SERPL-MCNC: 3.9 G/DL (ref 3.6–5.1)
ALBUMIN/GLOB SERPL: 1.2 {RATIO} (ref 1–2.4)
ALP SERPL-CCNC: 58 UNITS/L (ref 45–117)
ALT SERPL-CCNC: 16 UNITS/L
ANION GAP SERPL CALC-SCNC: 8 MMOL/L (ref 7–19)
AST SERPL-CCNC: 24 UNITS/L
BASOPHILS # BLD: 0.1 K/MCL (ref 0–0.3)
BASOPHILS NFR BLD: 1 %
BILIRUB SERPL-MCNC: 0.5 MG/DL (ref 0.2–1)
BLD GP AB SCN SERPL QL GEL: NEGATIVE
BUN SERPL-MCNC: 21 MG/DL (ref 6–20)
BUN/CREAT SERPL: 25 (ref 7–25)
CALCIUM SERPL-MCNC: 9.3 MG/DL (ref 8.4–10.2)
CHLORIDE SERPL-SCNC: 108 MMOL/L (ref 97–110)
CO2 SERPL-SCNC: 25 MMOL/L (ref 21–32)
CREAT SERPL-MCNC: 0.84 MG/DL (ref 0.51–0.95)
DEPRECATED RDW RBC: 39 FL (ref 39–50)
EOSINOPHIL # BLD: 0.1 K/MCL (ref 0–0.5)
EOSINOPHIL NFR BLD: 1 %
ERYTHROCYTE [DISTWIDTH] IN BLOOD: 18.6 % (ref 11–15)
FASTING DURATION TIME PATIENT: ABNORMAL H
GFR SERPLBLD BASED ON 1.73 SQ M-ARVRAT: 69 ML/MIN
GLOBULIN SER-MCNC: 3.3 G/DL (ref 2–4)
GLUCOSE SERPL-MCNC: 108 MG/DL (ref 70–99)
HCT VFR BLD CALC: 28.8 % (ref 36–46.5)
HGB BLD-MCNC: 9.2 G/DL (ref 12–15.5)
IMM GRANULOCYTES # BLD AUTO: 0.1 K/MCL (ref 0–0.2)
IMM GRANULOCYTES # BLD: 1 %
LIPASE SERPL-CCNC: 109 UNITS/L (ref 73–393)
LYMPHOCYTES # BLD: 2.6 K/MCL (ref 1–4)
LYMPHOCYTES NFR BLD: 28 %
MCH RBC QN AUTO: 20.1 PG (ref 26–34)
MCHC RBC AUTO-ENTMCNC: 31.9 G/DL (ref 32–36.5)
MCV RBC AUTO: 63 FL (ref 78–100)
MONOCYTES # BLD: 0.8 K/MCL (ref 0.3–0.9)
MONOCYTES NFR BLD: 9 %
NEUTROPHILS # BLD: 5.7 K/MCL (ref 1.8–7.7)
NEUTROPHILS NFR BLD: 60 %
NRBC BLD MANUAL-RTO: 4 /100 WBC
PLATELET # BLD AUTO: 285 K/MCL (ref 140–450)
POTASSIUM SERPL-SCNC: 4.1 MMOL/L (ref 3.4–5.1)
PROT SERPL-MCNC: 7.2 G/DL (ref 6.4–8.2)
RBC # BLD: 4.57 MIL/MCL (ref 4–5.2)
SODIUM SERPL-SCNC: 137 MMOL/L (ref 135–145)
TYPE AND SCREEN EXPIRATION DATE: NORMAL
WBC # BLD: 9.3 K/MCL (ref 4.2–11)

## 2023-01-12 PROCEDURE — G1004 CDSM NDSC: HCPCS

## 2023-01-12 PROCEDURE — 10002805 HB CONTRAST AGENT: Performed by: NURSE PRACTITIONER

## 2023-01-12 PROCEDURE — 99283 EMERGENCY DEPT VISIT LOW MDM: CPT | Performed by: STUDENT IN AN ORGANIZED HEALTH CARE EDUCATION/TRAINING PROGRAM

## 2023-01-12 PROCEDURE — 85025 COMPLETE CBC W/AUTO DIFF WBC: CPT | Performed by: EMERGENCY MEDICINE

## 2023-01-12 PROCEDURE — 80053 COMPREHEN METABOLIC PANEL: CPT | Performed by: EMERGENCY MEDICINE

## 2023-01-12 PROCEDURE — 83690 ASSAY OF LIPASE: CPT | Performed by: EMERGENCY MEDICINE

## 2023-01-12 PROCEDURE — 99285 EMERGENCY DEPT VISIT HI MDM: CPT

## 2023-01-12 PROCEDURE — 74177 CT ABD & PELVIS W/CONTRAST: CPT

## 2023-01-12 PROCEDURE — 36415 COLL VENOUS BLD VENIPUNCTURE: CPT

## 2023-01-12 PROCEDURE — 86850 RBC ANTIBODY SCREEN: CPT | Performed by: STUDENT IN AN ORGANIZED HEALTH CARE EDUCATION/TRAINING PROGRAM

## 2023-01-12 RX ADMIN — IOHEXOL 75 ML: 350 INJECTION, SOLUTION INTRAVENOUS at 23:25

## 2023-01-13 VITALS
WEIGHT: 146.16 LBS | RESPIRATION RATE: 16 BRPM | HEART RATE: 66 BPM | BODY MASS INDEX: 25.9 KG/M2 | SYSTOLIC BLOOD PRESSURE: 116 MMHG | TEMPERATURE: 97.5 F | DIASTOLIC BLOOD PRESSURE: 63 MMHG | OXYGEN SATURATION: 100 % | HEIGHT: 63 IN

## 2023-01-13 PROBLEM — R10.84 GENERALIZED ABDOMINAL PAIN: Status: ACTIVE | Noted: 2023-01-13

## 2023-01-13 PROBLEM — K56.600 PARTIAL SMALL BOWEL OBSTRUCTION  (CMD): Status: ACTIVE | Noted: 2023-01-13

## 2023-01-13 LAB
ANION GAP SERPL CALC-SCNC: 8 MMOL/L (ref 7–19)
BASOPHILS # BLD: 0.1 K/MCL (ref 0–0.3)
BASOPHILS NFR BLD: 1 %
BUN SERPL-MCNC: 16 MG/DL (ref 6–20)
BUN/CREAT SERPL: 19 (ref 7–25)
CALCIUM SERPL-MCNC: 8.8 MG/DL (ref 8.4–10.2)
CHLORIDE SERPL-SCNC: 109 MMOL/L (ref 97–110)
CO2 SERPL-SCNC: 27 MMOL/L (ref 21–32)
CREAT SERPL-MCNC: 0.85 MG/DL (ref 0.51–0.95)
DEPRECATED RDW RBC: 39.6 FL (ref 39–50)
EOSINOPHIL # BLD: 0.1 K/MCL (ref 0–0.5)
EOSINOPHIL NFR BLD: 1 %
ERYTHROCYTE [DISTWIDTH] IN BLOOD: 18.7 % (ref 11–15)
FASTING DURATION TIME PATIENT: NORMAL H
GFR SERPLBLD BASED ON 1.73 SQ M-ARVRAT: 68 ML/MIN
GLUCOSE SERPL-MCNC: 94 MG/DL (ref 70–99)
HCT VFR BLD CALC: 27.2 % (ref 36–46.5)
HGB BLD-MCNC: 8.7 G/DL (ref 12–15.5)
LACTATE BLDV-SCNC: 1.4 MMOL/L (ref 0–2)
LYMPHOCYTES # BLD: 2.2 K/MCL (ref 1–4)
LYMPHOCYTES NFR BLD: 19 %
MAGNESIUM SERPL-MCNC: 2 MG/DL (ref 1.7–2.4)
MCH RBC QN AUTO: 20.2 PG (ref 26–34)
MCHC RBC AUTO-ENTMCNC: 32 G/DL (ref 32–36.5)
MCV RBC AUTO: 63.3 FL (ref 78–100)
MONOCYTES # BLD: 0.2 K/MCL (ref 0.3–0.9)
MONOCYTES NFR BLD: 2 %
NEUTROPHILS # BLD: 7 K/MCL (ref 1.8–7.7)
NEUTS BAND NFR BLD: 3 % (ref 0–10)
NEUTS SEG NFR BLD: 70 %
NRBC BLD MANUAL-RTO: 3 /100 WBC
OVALOCYTES BLD QL SMEAR: ABNORMAL
PLAT MORPH BLD: NORMAL
PLATELET # BLD AUTO: 427 K/MCL (ref 140–450)
POLYCHROMASIA BLD QL SMEAR: ABNORMAL
POTASSIUM SERPL-SCNC: 4 MMOL/L (ref 3.4–5.1)
RAINBOW EXTRA TUBES HOLD SPECIMEN: NORMAL
RBC # BLD: 4.3 MIL/MCL (ref 4–5.2)
SARS-COV-2 RNA RESP QL NAA+PROBE: NOT DETECTED
SCHISTOCYTES BLD QL SMEAR: ABNORMAL
SERVICE CMNT-IMP: NORMAL
SERVICE CMNT-IMP: NORMAL
SODIUM SERPL-SCNC: 140 MMOL/L (ref 135–145)
TARGETS BLD QL SMEAR: ABNORMAL
VARIANT LYMPHS NFR BLD: 4 % (ref 0–5)
WBC # BLD: 9.6 K/MCL (ref 4.2–11)

## 2023-01-13 PROCEDURE — U0003 INFECTIOUS AGENT DETECTION BY NUCLEIC ACID (DNA OR RNA); SEVERE ACUTE RESPIRATORY SYNDROME CORONAVIRUS 2 (SARS-COV-2) (CORONAVIRUS DISEASE [COVID-19]), AMPLIFIED PROBE TECHNIQUE, MAKING USE OF HIGH THROUGHPUT TECHNOLOGIES AS DESCRIBED BY CMS-2020-01-R: HCPCS | Performed by: STUDENT IN AN ORGANIZED HEALTH CARE EDUCATION/TRAINING PROGRAM

## 2023-01-13 PROCEDURE — 99235 HOSP IP/OBS SAME DATE MOD 70: CPT | Performed by: STUDENT IN AN ORGANIZED HEALTH CARE EDUCATION/TRAINING PROGRAM

## 2023-01-13 PROCEDURE — 96360 HYDRATION IV INFUSION INIT: CPT

## 2023-01-13 PROCEDURE — G0378 HOSPITAL OBSERVATION PER HR: HCPCS

## 2023-01-13 PROCEDURE — 10002807 HB RX 258: Performed by: STUDENT IN AN ORGANIZED HEALTH CARE EDUCATION/TRAINING PROGRAM

## 2023-01-13 PROCEDURE — 99203 OFFICE O/P NEW LOW 30 MIN: CPT

## 2023-01-13 PROCEDURE — 96361 HYDRATE IV INFUSION ADD-ON: CPT

## 2023-01-13 PROCEDURE — 85027 COMPLETE CBC AUTOMATED: CPT | Performed by: STUDENT IN AN ORGANIZED HEALTH CARE EDUCATION/TRAINING PROGRAM

## 2023-01-13 PROCEDURE — 83735 ASSAY OF MAGNESIUM: CPT | Performed by: STUDENT IN AN ORGANIZED HEALTH CARE EDUCATION/TRAINING PROGRAM

## 2023-01-13 PROCEDURE — 80048 BASIC METABOLIC PNL TOTAL CA: CPT | Performed by: STUDENT IN AN ORGANIZED HEALTH CARE EDUCATION/TRAINING PROGRAM

## 2023-01-13 PROCEDURE — 83605 ASSAY OF LACTIC ACID: CPT

## 2023-01-13 RX ORDER — LANOLIN ALCOHOL/MO/W.PET/CERES
9 CREAM (GRAM) TOPICAL NIGHTLY PRN
Status: DISCONTINUED | OUTPATIENT
Start: 2023-01-13 | End: 2023-01-13 | Stop reason: HOSPADM

## 2023-01-13 RX ORDER — ATORVASTATIN CALCIUM 10 MG/1
10 TABLET, FILM COATED ORAL NIGHTLY
Status: DISCONTINUED | OUTPATIENT
Start: 2023-01-13 | End: 2023-01-13 | Stop reason: HOSPADM

## 2023-01-13 RX ORDER — ONDANSETRON 2 MG/ML
4 INJECTION INTRAMUSCULAR; INTRAVENOUS EVERY 12 HOURS PRN
Status: DISCONTINUED | OUTPATIENT
Start: 2023-01-13 | End: 2023-01-13 | Stop reason: HOSPADM

## 2023-01-13 RX ORDER — 0.9 % SODIUM CHLORIDE 0.9 %
2 VIAL (ML) INJECTION EVERY 12 HOURS SCHEDULED
Status: DISCONTINUED | OUTPATIENT
Start: 2023-01-13 | End: 2023-01-13 | Stop reason: HOSPADM

## 2023-01-13 RX ORDER — GABAPENTIN 100 MG/1
300 CAPSULE ORAL NIGHTLY
Status: DISCONTINUED | OUTPATIENT
Start: 2023-01-13 | End: 2023-01-13 | Stop reason: HOSPADM

## 2023-01-13 RX ORDER — SODIUM CHLORIDE 9 MG/ML
INJECTION, SOLUTION INTRAVENOUS CONTINUOUS
Status: DISCONTINUED | OUTPATIENT
Start: 2023-01-13 | End: 2023-01-13

## 2023-01-13 RX ORDER — SODIUM CHLORIDE, SODIUM LACTATE, POTASSIUM CHLORIDE, CALCIUM CHLORIDE 600; 310; 30; 20 MG/100ML; MG/100ML; MG/100ML; MG/100ML
INJECTION, SOLUTION INTRAVENOUS CONTINUOUS
Status: DISCONTINUED | OUTPATIENT
Start: 2023-01-13 | End: 2023-01-13

## 2023-01-13 RX ADMIN — SODIUM CHLORIDE, POTASSIUM CHLORIDE, SODIUM LACTATE AND CALCIUM CHLORIDE 1000 ML: 600; 310; 30; 20 INJECTION, SOLUTION INTRAVENOUS at 05:36

## 2023-01-13 RX ADMIN — SODIUM CHLORIDE: 9 INJECTION, SOLUTION INTRAVENOUS at 02:27

## 2023-01-13 RX ADMIN — SODIUM CHLORIDE, POTASSIUM CHLORIDE, SODIUM LACTATE AND CALCIUM CHLORIDE: 600; 310; 30; 20 INJECTION, SOLUTION INTRAVENOUS at 06:53

## 2023-01-13 SDOH — ECONOMIC STABILITY: HOUSING INSECURITY: WHAT IS YOUR LIVING SITUATION TODAY?: HOUSE

## 2023-01-13 SDOH — HEALTH STABILITY: PHYSICAL HEALTH: DO YOU HAVE DIFFICULTY DRESSING OR BATHING?: NO

## 2023-01-13 SDOH — HEALTH STABILITY: GENERAL
BECAUSE OF A PHYSICAL, MENTAL, OR EMOTIONAL CONDITION, DO YOU HAVE SERIOUS DIFFICULTY CONCENTRATING, REMEMBERING OR MAKING DECISIONS?: NO

## 2023-01-13 SDOH — ECONOMIC STABILITY: HOUSING INSECURITY: WHAT IS YOUR LIVING SITUATION TODAY?: ALONE

## 2023-01-13 SDOH — ECONOMIC STABILITY: GENERAL

## 2023-01-13 SDOH — ECONOMIC STABILITY: TRANSPORTATION INSECURITY
IN THE PAST 12 MONTHS, HAS THE LACK OF TRANSPORTATION KEPT YOU FROM MEDICAL APPOINTMENTS OR FROM GETTING MEDICATIONS?: NO

## 2023-01-13 SDOH — SOCIAL STABILITY: SOCIAL NETWORK
HOW OFTEN DO YOU SEE OR TALK TO PEOPLE THAT YOU CARE ABOUT AND FEEL CLOSE TO? (FOR EXAMPLE: TALKING TO FRIENDS ON THE PHONE, VISITING FRIENDS OR FAMILY, GOING TO CHURCH OR CLUB MEETINGS): LESS THAN ONCE A WEEK

## 2023-01-13 SDOH — HEALTH STABILITY: GENERAL: BECAUSE OF A PHYSICAL, MENTAL, OR EMOTIONAL CONDITION, DO YOU HAVE DIFFICULTY DOING ERRANDS ALONE?: NO

## 2023-01-13 SDOH — ECONOMIC STABILITY: TRANSPORTATION INSECURITY
IN THE PAST 12 MONTHS, HAS LACK OF TRANSPORTATION KEPT YOU FROM MEETINGS, WORK, OR FROM GETTING THINGS NEEDED FOR DAILY LIVING?: NO

## 2023-01-13 SDOH — SOCIAL STABILITY: SOCIAL NETWORK: SUPPORT SYSTEMS: FAMILY MEMBERS;FRIENDS

## 2023-01-13 SDOH — HEALTH STABILITY: PHYSICAL HEALTH: DO YOU HAVE SERIOUS DIFFICULTY WALKING OR CLIMBING STAIRS?: NO

## 2023-01-13 SDOH — ECONOMIC STABILITY: FOOD INSECURITY: HOW OFTEN IN THE PAST 12 MONTHS WERE YOU WORRIED OR STRESSED ABOUT HAVING ENOUGH MONEY TO BUY NUTRITIOUS MEALS?: NEVER

## 2023-01-13 SDOH — ECONOMIC STABILITY: HOUSING INSECURITY: ARE YOU WORRIED ABOUT LOSING YOUR HOUSING?: NO

## 2023-01-13 ASSESSMENT — PATIENT HEALTH QUESTIONNAIRE - PHQ9
SUM OF ALL RESPONSES TO PHQ9 QUESTIONS 1 AND 2: 0
CLINICAL INTERPRETATION OF PHQ2 SCORE: NO FURTHER SCREENING NEEDED
IS PATIENT ABLE TO COMPLETE PHQ2 OR PHQ9: YES
1. LITTLE INTEREST OR PLEASURE IN DOING THINGS: NOT AT ALL
2. FEELING DOWN, DEPRESSED OR HOPELESS: NOT AT ALL
SUM OF ALL RESPONSES TO PHQ9 QUESTIONS 1 AND 2: 0

## 2023-01-13 ASSESSMENT — LIFESTYLE VARIABLES
HOW OFTEN DO YOU HAVE 6 OR MORE DRINKS ON ONE OCCASION: NEVER
AUDIT-C TOTAL SCORE: 0
HOW OFTEN DO YOU HAVE A DRINK CONTAINING ALCOHOL: NEVER
HOW MANY STANDARD DRINKS CONTAINING ALCOHOL DO YOU HAVE ON A TYPICAL DAY: 0,1 OR 2
ALCOHOL_USE_STATUS: REFUSED SCREEN

## 2023-01-13 ASSESSMENT — ENCOUNTER SYMPTOMS
AGITATION: 0
ABDOMINAL PAIN: 1
HEADACHES: 0
DIZZINESS: 0
SORE THROAT: 0
BACK PAIN: 0
WOUND: 0
VOMITING: 0
FEVER: 0
APPETITE CHANGE: 0
NAUSEA: 0
ABDOMINAL DISTENTION: 1
ACTIVITY CHANGE: 0
SHORTNESS OF BREATH: 0

## 2023-01-13 ASSESSMENT — COLUMBIA-SUICIDE SEVERITY RATING SCALE - C-SSRS
2. HAVE YOU ACTUALLY HAD ANY THOUGHTS OF KILLING YOURSELF?: NO
1. WITHIN THE PAST MONTH, HAVE YOU WISHED YOU WERE DEAD OR WISHED YOU COULD GO TO SLEEP AND NOT WAKE UP?: NO
IS THE PATIENT ABLE TO COMPLETE C-SSRS: YES
6. HAVE YOU EVER DONE ANYTHING, STARTED TO DO ANYTHING, OR PREPARED TO DO ANYTHING TO END YOUR LIFE?: NO

## 2023-01-13 ASSESSMENT — ACTIVITIES OF DAILY LIVING (ADL)
ADL_SHORT_OF_BREATH: NO
RECENT_DECLINE_ADL: NO
ADL_BEFORE_ADMISSION: INDEPENDENT
ADL_SCORE: 12

## 2023-01-13 ASSESSMENT — PAIN SCALES - GENERAL: PAINLEVEL_OUTOF10: 0

## 2023-02-07 ENCOUNTER — CLINICAL ABSTRACT (OUTPATIENT)
Dept: HEMATOLOGY/ONCOLOGY | Age: 83
End: 2023-02-07

## 2023-02-07 DIAGNOSIS — D63.8 ANEMIA, CHRONIC DISEASE: Primary | ICD-10-CM

## 2023-02-07 RX ORDER — ALBUTEROL SULFATE 90 UG/1
2 AEROSOL, METERED RESPIRATORY (INHALATION)
Status: CANCELLED | OUTPATIENT
Start: 2023-02-08

## 2023-02-07 RX ORDER — DIPHENHYDRAMINE HYDROCHLORIDE 50 MG/ML
50 INJECTION INTRAMUSCULAR; INTRAVENOUS
Status: CANCELLED
Start: 2023-02-08

## 2023-02-07 RX ORDER — METHYLPREDNISOLONE SODIUM SUCCINATE 125 MG/2ML
125 INJECTION, POWDER, LYOPHILIZED, FOR SOLUTION INTRAMUSCULAR; INTRAVENOUS
Status: CANCELLED | OUTPATIENT
Start: 2023-02-08

## 2023-02-07 RX ORDER — FAMOTIDINE 10 MG/ML
20 INJECTION, SOLUTION INTRAVENOUS
Status: CANCELLED | OUTPATIENT
Start: 2023-02-08

## 2023-02-08 ENCOUNTER — LAB SERVICES (OUTPATIENT)
Dept: LAB | Age: 83
End: 2023-02-08

## 2023-02-08 ENCOUNTER — HOSPITAL ENCOUNTER (OUTPATIENT)
Dept: INFUSION THERAPY | Age: 83
Discharge: STILL ADMITTED OR ADMINISTRATIVE | End: 2023-02-08
Attending: INTERNAL MEDICINE

## 2023-02-08 VITALS
HEART RATE: 83 BPM | OXYGEN SATURATION: 97 % | DIASTOLIC BLOOD PRESSURE: 63 MMHG | TEMPERATURE: 97.3 F | BODY MASS INDEX: 26.63 KG/M2 | SYSTOLIC BLOOD PRESSURE: 108 MMHG | WEIGHT: 150.35 LBS

## 2023-02-08 DIAGNOSIS — D63.8 ANEMIA, CHRONIC DISEASE: ICD-10-CM

## 2023-02-08 DIAGNOSIS — D63.8 ANEMIA, CHRONIC DISEASE: Primary | ICD-10-CM

## 2023-02-08 LAB
ALBUMIN SERPL-MCNC: 3.6 G/DL (ref 3.6–5.1)
ALBUMIN/GLOB SERPL: 1.1 {RATIO} (ref 1–2.4)
ALP SERPL-CCNC: 56 UNITS/L (ref 45–117)
ALT SERPL-CCNC: 12 UNITS/L
ANION GAP SERPL CALC-SCNC: 9 MMOL/L (ref 7–19)
AST SERPL-CCNC: 20 UNITS/L
BASOPHILS # BLD: 0.1 K/MCL (ref 0–0.3)
BASOPHILS NFR BLD: 1 %
BILIRUB SERPL-MCNC: 0.6 MG/DL (ref 0.2–1)
BUN SERPL-MCNC: 20 MG/DL (ref 6–20)
BUN/CREAT SERPL: 19 (ref 7–25)
CALCIUM SERPL-MCNC: 9.9 MG/DL (ref 8.4–10.2)
CHLORIDE SERPL-SCNC: 111 MMOL/L (ref 97–110)
CO2 SERPL-SCNC: 26 MMOL/L (ref 21–32)
CREAT SERPL-MCNC: 1.07 MG/DL (ref 0.51–0.95)
DEPRECATED RDW RBC: 40.2 FL (ref 39–50)
EOSINOPHIL # BLD: 0.1 K/MCL (ref 0–0.5)
EOSINOPHIL NFR BLD: 1 %
ERYTHROCYTE [DISTWIDTH] IN BLOOD: 19 % (ref 11–15)
FASTING DURATION TIME PATIENT: ABNORMAL H
GFR SERPLBLD BASED ON 1.73 SQ M-ARVRAT: 52 ML/MIN
GLOBULIN SER-MCNC: 3.4 G/DL (ref 2–4)
GLUCOSE SERPL-MCNC: 85 MG/DL (ref 70–99)
HCT VFR BLD CALC: 27.9 % (ref 36–46.5)
HGB BLD-MCNC: 8.9 G/DL (ref 12–15.5)
IMM GRANULOCYTES # BLD AUTO: 0 K/MCL (ref 0–0.2)
IMM GRANULOCYTES # BLD: 1 %
IRON SERPL-MCNC: 102 MCG/DL (ref 50–170)
LYMPHOCYTES # BLD: 2.1 K/MCL (ref 1–4)
LYMPHOCYTES NFR BLD: 33 %
MCH RBC QN AUTO: 20.1 PG (ref 26–34)
MCHC RBC AUTO-ENTMCNC: 31.9 G/DL (ref 32–36.5)
MCV RBC AUTO: 63.1 FL (ref 78–100)
MONOCYTES # BLD: 0.7 K/MCL (ref 0.3–0.9)
MONOCYTES NFR BLD: 10 %
NEUTROPHILS # BLD: 3.5 K/MCL (ref 1.8–7.7)
NEUTROPHILS NFR BLD: 54 %
NRBC BLD MANUAL-RTO: 4 /100 WBC
PLATELET # BLD AUTO: 270 K/MCL (ref 140–450)
POTASSIUM SERPL-SCNC: 4.7 MMOL/L (ref 3.4–5.1)
PROT SERPL-MCNC: 7 G/DL (ref 6.4–8.2)
RBC # BLD: 4.42 MIL/MCL (ref 4–5.2)
SODIUM SERPL-SCNC: 141 MMOL/L (ref 135–145)
WBC # BLD: 6.5 K/MCL (ref 4.2–11)

## 2023-02-08 PROCEDURE — 36415 COLL VENOUS BLD VENIPUNCTURE: CPT | Performed by: CLINICAL MEDICAL LABORATORY

## 2023-02-08 PROCEDURE — 80053 COMPREHEN METABOLIC PANEL: CPT | Performed by: CLINICAL MEDICAL LABORATORY

## 2023-02-08 PROCEDURE — 96372 THER/PROPH/DIAG INJ SC/IM: CPT

## 2023-02-08 PROCEDURE — 83540 ASSAY OF IRON: CPT | Performed by: CLINICAL MEDICAL LABORATORY

## 2023-02-08 PROCEDURE — 85025 COMPLETE CBC W/AUTO DIFF WBC: CPT | Performed by: CLINICAL MEDICAL LABORATORY

## 2023-02-08 PROCEDURE — 10002800 HB RX 250 W HCPCS: Performed by: INTERNAL MEDICINE

## 2023-02-08 RX ADMIN — EPOETIN ALFA-EPBX 20000 UNITS: 10000 INJECTION, SOLUTION INTRAVENOUS; SUBCUTANEOUS at 14:06

## 2023-02-08 ASSESSMENT — PAIN SCALES - GENERAL: PAINLEVEL_OUTOF10: 0

## 2023-02-27 ENCOUNTER — CLINICAL ABSTRACT (OUTPATIENT)
Dept: HEMATOLOGY/ONCOLOGY | Age: 83
End: 2023-02-27

## 2023-02-28 ENCOUNTER — CLINICAL ABSTRACT (OUTPATIENT)
Dept: HEMATOLOGY/ONCOLOGY | Age: 83
End: 2023-02-28

## 2023-02-28 DIAGNOSIS — D63.8 ANEMIA, CHRONIC DISEASE: Primary | ICD-10-CM

## 2023-02-28 RX ORDER — ALBUTEROL SULFATE 90 UG/1
2 AEROSOL, METERED RESPIRATORY (INHALATION)
Status: CANCELLED | OUTPATIENT
Start: 2023-03-08

## 2023-02-28 RX ORDER — FAMOTIDINE 10 MG/ML
20 INJECTION, SOLUTION INTRAVENOUS
Status: CANCELLED | OUTPATIENT
Start: 2023-05-03

## 2023-02-28 RX ORDER — ALBUTEROL SULFATE 90 UG/1
2 AEROSOL, METERED RESPIRATORY (INHALATION)
Status: CANCELLED | OUTPATIENT
Start: 2023-05-03

## 2023-02-28 RX ORDER — DIPHENHYDRAMINE HYDROCHLORIDE 50 MG/ML
50 INJECTION INTRAMUSCULAR; INTRAVENOUS
Status: CANCELLED
Start: 2023-03-08

## 2023-02-28 RX ORDER — DIPHENHYDRAMINE HYDROCHLORIDE 50 MG/ML
50 INJECTION INTRAMUSCULAR; INTRAVENOUS
Status: CANCELLED
Start: 2023-05-03

## 2023-02-28 RX ORDER — FAMOTIDINE 10 MG/ML
20 INJECTION, SOLUTION INTRAVENOUS
Status: CANCELLED | OUTPATIENT
Start: 2023-03-08

## 2023-02-28 RX ORDER — METHYLPREDNISOLONE SODIUM SUCCINATE 125 MG/2ML
125 INJECTION, POWDER, LYOPHILIZED, FOR SOLUTION INTRAMUSCULAR; INTRAVENOUS
Status: CANCELLED | OUTPATIENT
Start: 2023-03-08

## 2023-02-28 RX ORDER — METHYLPREDNISOLONE SODIUM SUCCINATE 125 MG/2ML
125 INJECTION, POWDER, LYOPHILIZED, FOR SOLUTION INTRAMUSCULAR; INTRAVENOUS
Status: CANCELLED | OUTPATIENT
Start: 2023-05-03

## 2023-03-08 ENCOUNTER — CLINICAL ABSTRACT (OUTPATIENT)
Dept: HEALTH INFORMATION MANAGEMENT | Facility: OTHER | Age: 83
End: 2023-03-08

## 2023-03-08 ENCOUNTER — HOSPITAL ENCOUNTER (OUTPATIENT)
Dept: INFUSION THERAPY | Age: 83
Discharge: STILL ADMITTED OR ADMINISTRATIVE | End: 2023-03-08
Attending: INTERNAL MEDICINE

## 2023-03-08 ENCOUNTER — OFFICE VISIT (OUTPATIENT)
Dept: HEMATOLOGY/ONCOLOGY | Age: 83
End: 2023-03-08
Attending: INTERNAL MEDICINE

## 2023-03-08 ENCOUNTER — LAB SERVICES (OUTPATIENT)
Dept: LAB | Age: 83
End: 2023-03-08

## 2023-03-08 VITALS
DIASTOLIC BLOOD PRESSURE: 70 MMHG | BODY MASS INDEX: 26.56 KG/M2 | WEIGHT: 149.91 LBS | HEART RATE: 91 BPM | SYSTOLIC BLOOD PRESSURE: 118 MMHG | TEMPERATURE: 97.9 F | RESPIRATION RATE: 18 BRPM | OXYGEN SATURATION: 100 %

## 2023-03-08 VITALS
SYSTOLIC BLOOD PRESSURE: 130 MMHG | DIASTOLIC BLOOD PRESSURE: 78 MMHG | BODY MASS INDEX: 26.52 KG/M2 | OXYGEN SATURATION: 100 % | TEMPERATURE: 99 F | WEIGHT: 149.69 LBS | HEIGHT: 63 IN | RESPIRATION RATE: 18 BRPM | HEART RATE: 87 BPM

## 2023-03-08 DIAGNOSIS — D63.8 ANEMIA, CHRONIC DISEASE: Primary | ICD-10-CM

## 2023-03-08 DIAGNOSIS — Z85.43 HISTORY OF OVARIAN CANCER: ICD-10-CM

## 2023-03-08 DIAGNOSIS — D63.8 ANEMIA, CHRONIC DISEASE: ICD-10-CM

## 2023-03-08 DIAGNOSIS — Z85.43 HISTORY OF OVARIAN CANCER: Primary | ICD-10-CM

## 2023-03-08 LAB
ALBUMIN SERPL-MCNC: 3.6 G/DL (ref 3.6–5.1)
ALBUMIN/GLOB SERPL: 1.1 {RATIO} (ref 1–2.4)
ALP SERPL-CCNC: 56 UNITS/L (ref 45–117)
ALT SERPL-CCNC: 11 UNITS/L
ANION GAP SERPL CALC-SCNC: 9 MMOL/L (ref 7–19)
AST SERPL-CCNC: 25 UNITS/L
BASOPHILS # BLD: 0 K/MCL (ref 0–0.3)
BASOPHILS NFR BLD: 1 %
BILIRUB SERPL-MCNC: 0.6 MG/DL (ref 0.2–1)
BUN SERPL-MCNC: 20 MG/DL (ref 6–20)
BUN/CREAT SERPL: 22 (ref 7–25)
CALCIUM SERPL-MCNC: 10 MG/DL (ref 8.4–10.2)
CANCER AG125 SERPL-ACNC: 5 UNITS/ML (ref 0–35)
CHLORIDE SERPL-SCNC: 108 MMOL/L (ref 97–110)
CO2 SERPL-SCNC: 26 MMOL/L (ref 21–32)
CREAT SERPL-MCNC: 0.9 MG/DL (ref 0.51–0.95)
DEPRECATED RDW RBC: 39.5 FL (ref 39–50)
EOSINOPHIL # BLD: 0.1 K/MCL (ref 0–0.5)
EOSINOPHIL NFR BLD: 1 %
ERYTHROCYTE [DISTWIDTH] IN BLOOD: 18.8 % (ref 11–15)
FASTING DURATION TIME PATIENT: ABNORMAL H
GFR SERPLBLD BASED ON 1.73 SQ M-ARVRAT: 63 ML/MIN
GLOBULIN SER-MCNC: 3.4 G/DL (ref 2–4)
GLUCOSE SERPL-MCNC: 95 MG/DL (ref 70–99)
HCT VFR BLD CALC: 28.1 % (ref 36–46.5)
HGB BLD-MCNC: 9 G/DL (ref 12–15.5)
IMM GRANULOCYTES # BLD AUTO: 0 K/MCL (ref 0–0.2)
IMM GRANULOCYTES # BLD: 0 %
IRON SERPL-MCNC: 96 MCG/DL (ref 50–170)
LYMPHOCYTES # BLD: 2.2 K/MCL (ref 1–4)
LYMPHOCYTES NFR BLD: 38 %
MCH RBC QN AUTO: 20.2 PG (ref 26–34)
MCHC RBC AUTO-ENTMCNC: 32 G/DL (ref 32–36.5)
MCV RBC AUTO: 63 FL (ref 78–100)
MONOCYTES # BLD: 0.6 K/MCL (ref 0.3–0.9)
MONOCYTES NFR BLD: 10 %
NEUTROPHILS # BLD: 2.9 K/MCL (ref 1.8–7.7)
NEUTROPHILS NFR BLD: 50 %
NRBC BLD MANUAL-RTO: 4 /100 WBC
PLATELET # BLD AUTO: 274 K/MCL (ref 140–450)
POTASSIUM SERPL-SCNC: 5.2 MMOL/L (ref 3.4–5.1)
PROT SERPL-MCNC: 7 G/DL (ref 6.4–8.2)
RBC # BLD: 4.46 MIL/MCL (ref 4–5.2)
SODIUM SERPL-SCNC: 138 MMOL/L (ref 135–145)
WBC # BLD: 5.8 K/MCL (ref 4.2–11)

## 2023-03-08 PROCEDURE — 10002800 HB RX 250 W HCPCS: Performed by: INTERNAL MEDICINE

## 2023-03-08 PROCEDURE — 83540 ASSAY OF IRON: CPT | Performed by: CLINICAL MEDICAL LABORATORY

## 2023-03-08 PROCEDURE — 99211 OFF/OP EST MAY X REQ PHY/QHP: CPT

## 2023-03-08 PROCEDURE — 85025 COMPLETE CBC W/AUTO DIFF WBC: CPT | Performed by: CLINICAL MEDICAL LABORATORY

## 2023-03-08 PROCEDURE — 86304 IMMUNOASSAY TUMOR CA 125: CPT | Performed by: CLINICAL MEDICAL LABORATORY

## 2023-03-08 PROCEDURE — 36415 COLL VENOUS BLD VENIPUNCTURE: CPT | Performed by: CLINICAL MEDICAL LABORATORY

## 2023-03-08 PROCEDURE — 96372 THER/PROPH/DIAG INJ SC/IM: CPT

## 2023-03-08 PROCEDURE — 99214 OFFICE O/P EST MOD 30 MIN: CPT | Performed by: INTERNAL MEDICINE

## 2023-03-08 PROCEDURE — 80053 COMPREHEN METABOLIC PANEL: CPT | Performed by: CLINICAL MEDICAL LABORATORY

## 2023-03-08 RX ADMIN — EPOETIN ALFA-EPBX 20000 UNITS: 40000 INJECTION, SOLUTION INTRAVENOUS; SUBCUTANEOUS at 14:17

## 2023-03-08 ASSESSMENT — PATIENT HEALTH QUESTIONNAIRE - PHQ9
SUM OF ALL RESPONSES TO PHQ9 QUESTIONS 1 AND 2: 0
2. FEELING DOWN, DEPRESSED OR HOPELESS: NOT AT ALL
SUM OF ALL RESPONSES TO PHQ9 QUESTIONS 1 AND 2: 0
1. LITTLE INTEREST OR PLEASURE IN DOING THINGS: NOT AT ALL
CLINICAL INTERPRETATION OF PHQ2 SCORE: NO FURTHER SCREENING NEEDED
SUM OF ALL RESPONSES TO PHQ9 QUESTIONS 1 AND 2: 0
CLINICAL INTERPRETATION OF PHQ2 SCORE: NO FURTHER SCREENING NEEDED
SUM OF ALL RESPONSES TO PHQ9 QUESTIONS 1 AND 2: 0
2. FEELING DOWN, DEPRESSED OR HOPELESS: NOT AT ALL
1. LITTLE INTEREST OR PLEASURE IN DOING THINGS: NOT AT ALL
IS PATIENT ABLE TO COMPLETE PHQ2 OR PHQ9: YES

## 2023-03-08 ASSESSMENT — ENCOUNTER SYMPTOMS
CONSTITUTIONAL NEGATIVE: 1
HEMATOLOGIC/LYMPHATIC NEGATIVE: 1
ENDOCRINE NEGATIVE: 1
NEUROLOGICAL NEGATIVE: 1
PSYCHIATRIC NEGATIVE: 1
RESPIRATORY NEGATIVE: 1
ALLERGIC/IMMUNOLOGIC NEGATIVE: 1
EYES NEGATIVE: 1
GASTROINTESTINAL NEGATIVE: 1

## 2023-03-08 ASSESSMENT — PAIN SCALES - GENERAL
PAINLEVEL: 0
PAINLEVEL_OUTOF10: 0

## 2023-04-04 DIAGNOSIS — D63.8 ANEMIA, CHRONIC DISEASE: Primary | ICD-10-CM

## 2023-04-04 RX ORDER — DIPHENHYDRAMINE HYDROCHLORIDE 50 MG/ML
50 INJECTION INTRAMUSCULAR; INTRAVENOUS
Status: CANCELLED
Start: 2023-04-05

## 2023-04-04 RX ORDER — FAMOTIDINE 10 MG/ML
20 INJECTION, SOLUTION INTRAVENOUS
Status: CANCELLED | OUTPATIENT
Start: 2023-04-05

## 2023-04-04 RX ORDER — METHYLPREDNISOLONE SODIUM SUCCINATE 125 MG/2ML
125 INJECTION, POWDER, LYOPHILIZED, FOR SOLUTION INTRAMUSCULAR; INTRAVENOUS
Status: CANCELLED | OUTPATIENT
Start: 2023-04-05

## 2023-04-04 RX ORDER — ALBUTEROL SULFATE 90 UG/1
2 AEROSOL, METERED RESPIRATORY (INHALATION)
Status: CANCELLED | OUTPATIENT
Start: 2023-04-05

## 2023-04-05 ENCOUNTER — LAB SERVICES (OUTPATIENT)
Dept: LAB | Age: 83
End: 2023-04-05

## 2023-04-05 ENCOUNTER — HOSPITAL ENCOUNTER (OUTPATIENT)
Dept: INFUSION THERAPY | Age: 83
Discharge: STILL ADMITTED OR ADMINISTRATIVE | End: 2023-04-05
Attending: INTERNAL MEDICINE

## 2023-04-05 VITALS
TEMPERATURE: 97.9 F | BODY MASS INDEX: 26.75 KG/M2 | DIASTOLIC BLOOD PRESSURE: 67 MMHG | WEIGHT: 151.01 LBS | OXYGEN SATURATION: 99 % | SYSTOLIC BLOOD PRESSURE: 105 MMHG | HEART RATE: 79 BPM

## 2023-04-05 DIAGNOSIS — D63.8 ANEMIA, CHRONIC DISEASE: ICD-10-CM

## 2023-04-05 DIAGNOSIS — D63.8 ANEMIA, CHRONIC DISEASE: Primary | ICD-10-CM

## 2023-04-05 LAB
ALBUMIN SERPL-MCNC: 3.7 G/DL (ref 3.6–5.1)
ALBUMIN/GLOB SERPL: 1.2 {RATIO} (ref 1–2.4)
ALP SERPL-CCNC: 52 UNITS/L (ref 45–117)
ALT SERPL-CCNC: 12 UNITS/L
ANION GAP SERPL CALC-SCNC: 5 MMOL/L (ref 7–19)
AST SERPL-CCNC: 19 UNITS/L
BASOPHILS # BLD: 0.1 K/MCL (ref 0–0.3)
BASOPHILS NFR BLD: 1 %
BILIRUB SERPL-MCNC: 0.7 MG/DL (ref 0.2–1)
BUN SERPL-MCNC: 18 MG/DL (ref 6–20)
BUN/CREAT SERPL: 21 (ref 7–25)
CALCIUM SERPL-MCNC: 10.3 MG/DL (ref 8.4–10.2)
CHLORIDE SERPL-SCNC: 111 MMOL/L (ref 97–110)
CO2 SERPL-SCNC: 29 MMOL/L (ref 21–32)
CREAT SERPL-MCNC: 0.84 MG/DL (ref 0.51–0.95)
DEPRECATED RDW RBC: 39.5 FL (ref 39–50)
EOSINOPHIL # BLD: 0.1 K/MCL (ref 0–0.5)
EOSINOPHIL NFR BLD: 2 %
ERYTHROCYTE [DISTWIDTH] IN BLOOD: 18.9 % (ref 11–15)
FASTING DURATION TIME PATIENT: ABNORMAL H
GFR SERPLBLD BASED ON 1.73 SQ M-ARVRAT: 69 ML/MIN
GLOBULIN SER-MCNC: 3.2 G/DL (ref 2–4)
GLUCOSE SERPL-MCNC: 90 MG/DL (ref 70–99)
HCT VFR BLD CALC: 29.2 % (ref 36–46.5)
HGB BLD-MCNC: 9.5 G/DL (ref 12–15.5)
IMM GRANULOCYTES # BLD AUTO: 0 K/MCL (ref 0–0.2)
IMM GRANULOCYTES # BLD: 1 %
IRON SERPL-MCNC: 104 MCG/DL (ref 50–170)
LYMPHOCYTES # BLD: 1.9 K/MCL (ref 1–4)
LYMPHOCYTES NFR BLD: 33 %
MCH RBC QN AUTO: 20.4 PG (ref 26–34)
MCHC RBC AUTO-ENTMCNC: 32.5 G/DL (ref 32–36.5)
MCV RBC AUTO: 62.8 FL (ref 78–100)
MONOCYTES # BLD: 0.6 K/MCL (ref 0.3–0.9)
MONOCYTES NFR BLD: 10 %
NEUTROPHILS # BLD: 3.1 K/MCL (ref 1.8–7.7)
NEUTROPHILS NFR BLD: 53 %
NRBC BLD MANUAL-RTO: 5 /100 WBC
PLATELET # BLD AUTO: 294 K/MCL (ref 140–450)
POTASSIUM SERPL-SCNC: 4.4 MMOL/L (ref 3.4–5.1)
PROT SERPL-MCNC: 6.9 G/DL (ref 6.4–8.2)
RBC # BLD: 4.65 MIL/MCL (ref 4–5.2)
SODIUM SERPL-SCNC: 141 MMOL/L (ref 135–145)
WBC # BLD: 5.8 K/MCL (ref 4.2–11)

## 2023-04-05 PROCEDURE — 36415 COLL VENOUS BLD VENIPUNCTURE: CPT | Performed by: CLINICAL MEDICAL LABORATORY

## 2023-04-05 PROCEDURE — 80053 COMPREHEN METABOLIC PANEL: CPT | Performed by: CLINICAL MEDICAL LABORATORY

## 2023-04-05 PROCEDURE — 83540 ASSAY OF IRON: CPT | Performed by: CLINICAL MEDICAL LABORATORY

## 2023-04-05 PROCEDURE — 85025 COMPLETE CBC W/AUTO DIFF WBC: CPT | Performed by: CLINICAL MEDICAL LABORATORY

## 2023-04-05 PROCEDURE — 10002800 HB RX 250 W HCPCS: Performed by: INTERNAL MEDICINE

## 2023-04-05 PROCEDURE — 96372 THER/PROPH/DIAG INJ SC/IM: CPT

## 2023-04-05 RX ADMIN — EPOETIN ALFA-EPBX 20000 UNITS: 40000 INJECTION, SOLUTION INTRAVENOUS; SUBCUTANEOUS at 12:01

## 2023-04-05 ASSESSMENT — PAIN SCALES - GENERAL: PAINLEVEL_OUTOF10: 0

## 2023-04-05 ASSESSMENT — PATIENT HEALTH QUESTIONNAIRE - PHQ9
IS PATIENT ABLE TO COMPLETE PHQ2 OR PHQ9: YES
CLINICAL INTERPRETATION OF PHQ2 SCORE: NO FURTHER SCREENING NEEDED
SUM OF ALL RESPONSES TO PHQ9 QUESTIONS 1 AND 2: 0

## 2023-04-08 ENCOUNTER — CLINICAL ABSTRACT (OUTPATIENT)
Dept: HEMATOLOGY/ONCOLOGY | Age: 83
End: 2023-04-08

## 2023-05-02 ENCOUNTER — TELEPHONE (OUTPATIENT)
Dept: INFUSION THERAPY | Age: 83
End: 2023-05-02

## 2023-05-03 ENCOUNTER — HOSPITAL ENCOUNTER (OUTPATIENT)
Dept: INFUSION THERAPY | Age: 83
Discharge: STILL ADMITTED OR ADMINISTRATIVE | End: 2023-05-03
Attending: INTERNAL MEDICINE

## 2023-05-03 ENCOUNTER — CLINICAL ABSTRACT (OUTPATIENT)
Dept: INFUSION THERAPY | Age: 83
End: 2023-05-03

## 2023-05-03 ENCOUNTER — LAB SERVICES (OUTPATIENT)
Dept: LAB | Age: 83
End: 2023-05-03

## 2023-05-03 VITALS
TEMPERATURE: 97.5 F | WEIGHT: 150.79 LBS | HEART RATE: 83 BPM | DIASTOLIC BLOOD PRESSURE: 79 MMHG | BODY MASS INDEX: 26.71 KG/M2 | SYSTOLIC BLOOD PRESSURE: 130 MMHG | OXYGEN SATURATION: 100 %

## 2023-05-03 DIAGNOSIS — D63.8 ANEMIA, CHRONIC DISEASE: Primary | ICD-10-CM

## 2023-05-03 DIAGNOSIS — D63.8 ANEMIA, CHRONIC DISEASE: ICD-10-CM

## 2023-05-03 LAB
ALBUMIN SERPL-MCNC: 3.6 G/DL (ref 3.6–5.1)
ALBUMIN/GLOB SERPL: 1 {RATIO} (ref 1–2.4)
ALP SERPL-CCNC: 51 UNITS/L (ref 45–117)
ALT SERPL-CCNC: 13 UNITS/L
ANION GAP SERPL CALC-SCNC: 4 MMOL/L (ref 7–19)
AST SERPL-CCNC: 12 UNITS/L
BASOPHILS # BLD: 0 K/MCL (ref 0–0.3)
BASOPHILS # BLD: 0 K/MCL (ref 0–0.3)
BASOPHILS NFR BLD: 1 %
BASOPHILS NFR BLD: 1 %
BILIRUB SERPL-MCNC: 0.6 MG/DL (ref 0.2–1)
BUN SERPL-MCNC: 21 MG/DL (ref 6–20)
BUN/CREAT SERPL: 21 (ref 7–25)
CALCIUM SERPL-MCNC: 9.7 MG/DL (ref 8.4–10.2)
CHLORIDE SERPL-SCNC: 112 MMOL/L (ref 97–110)
CO2 SERPL-SCNC: 28 MMOL/L (ref 21–32)
CREAT SERPL-MCNC: 0.98 MG/DL (ref 0.51–0.95)
DEPRECATED RDW RBC: 38.6 FL (ref 39–50)
DEPRECATED RDW RBC: 39 FL (ref 39–50)
EOSINOPHIL # BLD: 0.1 K/MCL (ref 0–0.5)
EOSINOPHIL # BLD: 0.1 K/MCL (ref 0–0.5)
EOSINOPHIL NFR BLD: 2 %
EOSINOPHIL NFR BLD: 2 %
ERYTHROCYTE [DISTWIDTH] IN BLOOD: 18.4 % (ref 11–15)
ERYTHROCYTE [DISTWIDTH] IN BLOOD: 18.6 % (ref 11–15)
FASTING DURATION TIME PATIENT: ABNORMAL H
GFR SERPLBLD BASED ON 1.73 SQ M-ARVRAT: 57 ML/MIN
GLOBULIN SER-MCNC: 3.7 G/DL (ref 2–4)
GLUCOSE SERPL-MCNC: 92 MG/DL (ref 70–99)
HCT VFR BLD CALC: 29.1 % (ref 36–46.5)
HCT VFR BLD CALC: 29.2 % (ref 36–46.5)
HGB BLD-MCNC: 9.1 G/DL (ref 12–15.5)
HGB BLD-MCNC: 9.2 G/DL (ref 12–15.5)
IMM GRANULOCYTES # BLD AUTO: 0 K/MCL (ref 0–0.2)
IMM GRANULOCYTES # BLD AUTO: 0 K/MCL (ref 0–0.2)
IMM GRANULOCYTES # BLD: 1 %
IMM GRANULOCYTES # BLD: 1 %
IRON SERPL-MCNC: 93 MCG/DL (ref 50–170)
LYMPHOCYTES # BLD: 1.9 K/MCL (ref 1–4)
LYMPHOCYTES # BLD: 1.9 K/MCL (ref 1–4)
LYMPHOCYTES NFR BLD: 35 %
LYMPHOCYTES NFR BLD: 36 %
MCH RBC QN AUTO: 19.7 PG (ref 26–34)
MCH RBC QN AUTO: 19.8 PG (ref 26–34)
MCHC RBC AUTO-ENTMCNC: 31.3 G/DL (ref 32–36.5)
MCHC RBC AUTO-ENTMCNC: 31.5 G/DL (ref 32–36.5)
MCV RBC AUTO: 62.9 FL (ref 78–100)
MCV RBC AUTO: 63.1 FL (ref 78–100)
MONOCYTES # BLD: 0.6 K/MCL (ref 0.3–0.9)
MONOCYTES # BLD: 0.6 K/MCL (ref 0.3–0.9)
MONOCYTES NFR BLD: 11 %
MONOCYTES NFR BLD: 11 %
NEUTROPHILS # BLD: 2.7 K/MCL (ref 1.8–7.7)
NEUTROPHILS # BLD: 2.9 K/MCL (ref 1.8–7.7)
NEUTROPHILS NFR BLD: 49 %
NEUTROPHILS NFR BLD: 50 %
NRBC BLD MANUAL-RTO: 3 /100 WBC
NRBC BLD MANUAL-RTO: 4 /100 WBC
PLATELET # BLD AUTO: 293 K/MCL (ref 140–450)
PLATELET # BLD AUTO: 295 K/MCL (ref 140–450)
POTASSIUM SERPL-SCNC: 5.3 MMOL/L (ref 3.4–5.1)
PROT SERPL-MCNC: 7.3 G/DL (ref 6.4–8.2)
RBC # BLD: 4.61 MIL/MCL (ref 4–5.2)
RBC # BLD: 4.64 MIL/MCL (ref 4–5.2)
SODIUM SERPL-SCNC: 139 MMOL/L (ref 135–145)
WBC # BLD: 5.3 K/MCL (ref 4.2–11)
WBC # BLD: 5.6 K/MCL (ref 4.2–11)

## 2023-05-03 PROCEDURE — 85025 COMPLETE CBC W/AUTO DIFF WBC: CPT | Performed by: CLINICAL MEDICAL LABORATORY

## 2023-05-03 PROCEDURE — 10002800 HB RX 250 W HCPCS: Performed by: INTERNAL MEDICINE

## 2023-05-03 PROCEDURE — 88185 FLOWCYTOMETRY/TC ADD-ON: CPT | Performed by: CLINICAL MEDICAL LABORATORY

## 2023-05-03 PROCEDURE — 36415 COLL VENOUS BLD VENIPUNCTURE: CPT | Performed by: CLINICAL MEDICAL LABORATORY

## 2023-05-03 PROCEDURE — 83540 ASSAY OF IRON: CPT | Performed by: CLINICAL MEDICAL LABORATORY

## 2023-05-03 PROCEDURE — 80053 COMPREHEN METABOLIC PANEL: CPT | Performed by: CLINICAL MEDICAL LABORATORY

## 2023-05-03 PROCEDURE — 96372 THER/PROPH/DIAG INJ SC/IM: CPT

## 2023-05-03 PROCEDURE — 88184 FLOWCYTOMETRY/ TC 1 MARKER: CPT | Performed by: CLINICAL MEDICAL LABORATORY

## 2023-05-03 RX ORDER — ALBUTEROL SULFATE 90 UG/1
2 AEROSOL, METERED RESPIRATORY (INHALATION)
Status: CANCELLED | OUTPATIENT
Start: 2023-05-31

## 2023-05-03 RX ORDER — METHYLPREDNISOLONE SODIUM SUCCINATE 125 MG/2ML
125 INJECTION, POWDER, LYOPHILIZED, FOR SOLUTION INTRAMUSCULAR; INTRAVENOUS
Status: CANCELLED | OUTPATIENT
Start: 2023-05-31

## 2023-05-03 RX ORDER — DIPHENHYDRAMINE HYDROCHLORIDE 50 MG/ML
50 INJECTION INTRAMUSCULAR; INTRAVENOUS
Status: CANCELLED
Start: 2023-05-31

## 2023-05-03 RX ORDER — FAMOTIDINE 10 MG/ML
20 INJECTION, SOLUTION INTRAVENOUS
Status: CANCELLED | OUTPATIENT
Start: 2023-05-31

## 2023-05-03 RX ADMIN — EPOETIN ALFA-EPBX 20000 UNITS: 40000 INJECTION, SOLUTION INTRAVENOUS; SUBCUTANEOUS at 13:50

## 2023-05-03 ASSESSMENT — PATIENT HEALTH QUESTIONNAIRE - PHQ9
SUM OF ALL RESPONSES TO PHQ9 QUESTIONS 1 AND 2: 0
1. LITTLE INTEREST OR PLEASURE IN DOING THINGS: NOT AT ALL
2. FEELING DOWN, DEPRESSED OR HOPELESS: NOT AT ALL
SUM OF ALL RESPONSES TO PHQ9 QUESTIONS 1 AND 2: 0
CLINICAL INTERPRETATION OF PHQ2 SCORE: NO FURTHER SCREENING NEEDED

## 2023-05-03 ASSESSMENT — PAIN SCALES - GENERAL: PAINLEVEL_OUTOF10: 0

## 2023-05-04 LAB
CASE RPRT: NORMAL
CLINICAL INFO: NORMAL
FLOW CYTOMETRY STUDY: NORMAL
Lab: NORMAL

## 2023-05-11 DIAGNOSIS — Z85.43 PERSONAL HISTORY OF MALIGNANT NEOPLASM OF OVARY: Primary | ICD-10-CM

## 2023-05-11 DIAGNOSIS — R10.9 STOMACH ACHE: ICD-10-CM

## 2023-05-31 ENCOUNTER — APPOINTMENT (OUTPATIENT)
Dept: LAB | Age: 83
End: 2023-05-31

## 2023-05-31 ENCOUNTER — HOSPITAL ENCOUNTER (OUTPATIENT)
Dept: INFUSION THERAPY | Age: 83
Discharge: STILL ADMITTED OR ADMINISTRATIVE | End: 2023-05-31
Attending: INTERNAL MEDICINE

## 2023-05-31 ENCOUNTER — LAB SERVICES (OUTPATIENT)
Dept: LAB | Age: 83
End: 2023-05-31

## 2023-05-31 VITALS
SYSTOLIC BLOOD PRESSURE: 106 MMHG | OXYGEN SATURATION: 100 % | BODY MASS INDEX: 26.48 KG/M2 | DIASTOLIC BLOOD PRESSURE: 64 MMHG | HEART RATE: 82 BPM | WEIGHT: 149.47 LBS

## 2023-05-31 DIAGNOSIS — D63.8 ANEMIA, CHRONIC DISEASE: Primary | ICD-10-CM

## 2023-05-31 DIAGNOSIS — D63.8 ANEMIA, CHRONIC DISEASE: ICD-10-CM

## 2023-05-31 LAB
ALBUMIN SERPL-MCNC: 3.7 G/DL (ref 3.6–5.1)
ALBUMIN/GLOB SERPL: 1.1 {RATIO} (ref 1–2.4)
ALP SERPL-CCNC: 51 UNITS/L (ref 45–117)
ALT SERPL-CCNC: 13 UNITS/L
ANION GAP SERPL CALC-SCNC: 7 MMOL/L (ref 7–19)
AST SERPL-CCNC: 20 UNITS/L
BASOPHILS # BLD: 0.1 K/MCL (ref 0–0.3)
BASOPHILS NFR BLD: 1 %
BILIRUB SERPL-MCNC: 0.8 MG/DL (ref 0.2–1)
BUN SERPL-MCNC: 16 MG/DL (ref 6–20)
BUN/CREAT SERPL: 19 (ref 7–25)
CALCIUM SERPL-MCNC: 9.6 MG/DL (ref 8.4–10.2)
CHLORIDE SERPL-SCNC: 111 MMOL/L (ref 97–110)
CO2 SERPL-SCNC: 26 MMOL/L (ref 21–32)
CREAT SERPL-MCNC: 0.86 MG/DL (ref 0.51–0.95)
DEPRECATED RDW RBC: 38.9 FL (ref 39–50)
EOSINOPHIL # BLD: 0.1 K/MCL (ref 0–0.5)
EOSINOPHIL NFR BLD: 2 %
ERYTHROCYTE [DISTWIDTH] IN BLOOD: 18.4 % (ref 11–15)
FASTING DURATION TIME PATIENT: ABNORMAL H
GFR SERPLBLD BASED ON 1.73 SQ M-ARVRAT: 67 ML/MIN
GLOBULIN SER-MCNC: 3.5 G/DL (ref 2–4)
GLUCOSE SERPL-MCNC: 91 MG/DL (ref 70–99)
HCT VFR BLD CALC: 29.8 % (ref 36–46.5)
HGB BLD-MCNC: 9.5 G/DL (ref 12–15.5)
IMM GRANULOCYTES # BLD AUTO: 0 K/MCL (ref 0–0.2)
IMM GRANULOCYTES # BLD: 1 %
IRON SERPL-MCNC: 106 MCG/DL (ref 50–170)
LYMPHOCYTES # BLD: 1.9 K/MCL (ref 1–4)
LYMPHOCYTES NFR BLD: 29 %
MCH RBC QN AUTO: 20.5 PG (ref 26–34)
MCHC RBC AUTO-ENTMCNC: 31.9 G/DL (ref 32–36.5)
MCV RBC AUTO: 64.2 FL (ref 78–100)
MONOCYTES # BLD: 0.6 K/MCL (ref 0.3–0.9)
MONOCYTES NFR BLD: 10 %
NEUTROPHILS # BLD: 3.9 K/MCL (ref 1.8–7.7)
NEUTROPHILS NFR BLD: 57 %
NRBC BLD MANUAL-RTO: 3 /100 WBC
PLATELET # BLD AUTO: 264 K/MCL (ref 140–450)
POTASSIUM SERPL-SCNC: 4.8 MMOL/L (ref 3.4–5.1)
PROT SERPL-MCNC: 7.2 G/DL (ref 6.4–8.2)
RBC # BLD: 4.64 MIL/MCL (ref 4–5.2)
SODIUM SERPL-SCNC: 139 MMOL/L (ref 135–145)
WBC # BLD: 6.7 K/MCL (ref 4.2–11)

## 2023-05-31 PROCEDURE — 96372 THER/PROPH/DIAG INJ SC/IM: CPT | Performed by: INTERNAL MEDICINE

## 2023-05-31 PROCEDURE — 36415 COLL VENOUS BLD VENIPUNCTURE: CPT | Performed by: CLINICAL MEDICAL LABORATORY

## 2023-05-31 PROCEDURE — 10002800 HB RX 250 W HCPCS: Performed by: INTERNAL MEDICINE

## 2023-05-31 PROCEDURE — 83540 ASSAY OF IRON: CPT | Performed by: CLINICAL MEDICAL LABORATORY

## 2023-05-31 PROCEDURE — 80053 COMPREHEN METABOLIC PANEL: CPT | Performed by: CLINICAL MEDICAL LABORATORY

## 2023-05-31 PROCEDURE — 85025 COMPLETE CBC W/AUTO DIFF WBC: CPT | Performed by: CLINICAL MEDICAL LABORATORY

## 2023-05-31 RX ADMIN — EPOETIN ALFA-EPBX 20000 UNITS: 40000 INJECTION, SOLUTION INTRAVENOUS; SUBCUTANEOUS at 13:21

## 2023-05-31 ASSESSMENT — PAIN SCALES - GENERAL: PAINLEVEL_OUTOF10: 0

## 2023-05-31 ASSESSMENT — PATIENT HEALTH QUESTIONNAIRE - PHQ9
SUM OF ALL RESPONSES TO PHQ9 QUESTIONS 1 AND 2: 0
SUM OF ALL RESPONSES TO PHQ9 QUESTIONS 1 AND 2: 0
2. FEELING DOWN, DEPRESSED OR HOPELESS: NOT AT ALL
1. LITTLE INTEREST OR PLEASURE IN DOING THINGS: NOT AT ALL
IS PATIENT ABLE TO COMPLETE PHQ2 OR PHQ9: YES
CLINICAL INTERPRETATION OF PHQ2 SCORE: NO FURTHER SCREENING NEEDED

## 2023-06-24 ENCOUNTER — CLINICAL ABSTRACT (OUTPATIENT)
Dept: HEMATOLOGY/ONCOLOGY | Age: 83
End: 2023-06-24

## 2023-06-24 DIAGNOSIS — D63.8 ANEMIA, CHRONIC DISEASE: Primary | ICD-10-CM

## 2023-06-24 DIAGNOSIS — D63.8 ANEMIA, CHRONIC DISEASE: ICD-10-CM

## 2023-06-24 RX ORDER — DIPHENHYDRAMINE HYDROCHLORIDE 50 MG/ML
50 INJECTION INTRAMUSCULAR; INTRAVENOUS
Status: CANCELLED
Start: 2023-06-28

## 2023-06-24 RX ORDER — ALBUTEROL SULFATE 90 UG/1
2 AEROSOL, METERED RESPIRATORY (INHALATION)
Status: CANCELLED | OUTPATIENT
Start: 2023-06-28

## 2023-06-24 RX ORDER — FAMOTIDINE 10 MG/ML
20 INJECTION, SOLUTION INTRAVENOUS
Status: CANCELLED | OUTPATIENT
Start: 2023-06-28

## 2023-06-24 RX ORDER — METHYLPREDNISOLONE SODIUM SUCCINATE 125 MG/2ML
125 INJECTION, POWDER, LYOPHILIZED, FOR SOLUTION INTRAMUSCULAR; INTRAVENOUS
Status: CANCELLED | OUTPATIENT
Start: 2023-06-28

## 2023-06-28 ENCOUNTER — HOSPITAL ENCOUNTER (OUTPATIENT)
Dept: INFUSION THERAPY | Age: 83
Discharge: STILL ADMITTED OR ADMINISTRATIVE | End: 2023-06-28
Attending: INTERNAL MEDICINE

## 2023-06-28 ENCOUNTER — LAB SERVICES (OUTPATIENT)
Dept: LAB | Age: 83
End: 2023-06-28

## 2023-06-28 VITALS
WEIGHT: 151.24 LBS | TEMPERATURE: 97.2 F | RESPIRATION RATE: 18 BRPM | BODY MASS INDEX: 26.79 KG/M2 | SYSTOLIC BLOOD PRESSURE: 111 MMHG | OXYGEN SATURATION: 99 % | HEART RATE: 91 BPM | DIASTOLIC BLOOD PRESSURE: 71 MMHG

## 2023-06-28 DIAGNOSIS — D63.8 ANEMIA, CHRONIC DISEASE: ICD-10-CM

## 2023-06-28 DIAGNOSIS — D63.8 ANEMIA, CHRONIC DISEASE: Primary | ICD-10-CM

## 2023-06-28 LAB
ALBUMIN SERPL-MCNC: 3.5 G/DL (ref 3.6–5.1)
ALBUMIN/GLOB SERPL: 1 {RATIO} (ref 1–2.4)
ALP SERPL-CCNC: 45 UNITS/L (ref 45–117)
ALT SERPL-CCNC: 14 UNITS/L
ANION GAP SERPL CALC-SCNC: 8 MMOL/L (ref 7–19)
AST SERPL-CCNC: 18 UNITS/L
BASOPHILS # BLD: 0.1 K/MCL (ref 0–0.3)
BASOPHILS NFR BLD: 1 %
BILIRUB SERPL-MCNC: 0.6 MG/DL (ref 0.2–1)
BUN SERPL-MCNC: 17 MG/DL (ref 6–20)
BUN/CREAT SERPL: 18 (ref 7–25)
CALCIUM SERPL-MCNC: 9.4 MG/DL (ref 8.4–10.2)
CHLORIDE SERPL-SCNC: 111 MMOL/L (ref 97–110)
CO2 SERPL-SCNC: 26 MMOL/L (ref 21–32)
CREAT SERPL-MCNC: 0.92 MG/DL (ref 0.51–0.95)
DEPRECATED RDW RBC: 41.1 FL (ref 39–50)
EOSINOPHIL # BLD: 0.2 K/MCL (ref 0–0.5)
EOSINOPHIL NFR BLD: 3 %
ERYTHROCYTE [DISTWIDTH] IN BLOOD: 19.2 % (ref 11–15)
FASTING DURATION TIME PATIENT: ABNORMAL H
GFR SERPLBLD BASED ON 1.73 SQ M-ARVRAT: 62 ML/MIN
GLOBULIN SER-MCNC: 3.4 G/DL (ref 2–4)
GLUCOSE SERPL-MCNC: 95 MG/DL (ref 70–99)
HCT VFR BLD CALC: 29 % (ref 36–46.5)
HGB BLD-MCNC: 9.2 G/DL (ref 12–15.5)
IMM GRANULOCYTES # BLD AUTO: 0 K/MCL (ref 0–0.2)
IMM GRANULOCYTES # BLD: 1 %
IRON SERPL-MCNC: 94 MCG/DL (ref 50–170)
LYMPHOCYTES # BLD: 1.9 K/MCL (ref 1–4)
LYMPHOCYTES NFR BLD: 30 %
MCH RBC QN AUTO: 20.5 PG (ref 26–34)
MCHC RBC AUTO-ENTMCNC: 31.7 G/DL (ref 32–36.5)
MCV RBC AUTO: 64.7 FL (ref 78–100)
MONOCYTES # BLD: 0.7 K/MCL (ref 0.3–0.9)
MONOCYTES NFR BLD: 11 %
NEUTROPHILS # BLD: 3.3 K/MCL (ref 1.8–7.7)
NEUTROPHILS NFR BLD: 54 %
NRBC BLD MANUAL-RTO: 3 /100 WBC
PLATELET # BLD AUTO: 273 K/MCL (ref 140–450)
POTASSIUM SERPL-SCNC: 4.9 MMOL/L (ref 3.4–5.1)
PROT SERPL-MCNC: 6.9 G/DL (ref 6.4–8.2)
RBC # BLD: 4.48 MIL/MCL (ref 4–5.2)
SODIUM SERPL-SCNC: 140 MMOL/L (ref 135–145)
WBC # BLD: 6.2 K/MCL (ref 4.2–11)

## 2023-06-28 PROCEDURE — 36415 COLL VENOUS BLD VENIPUNCTURE: CPT | Performed by: CLINICAL MEDICAL LABORATORY

## 2023-06-28 PROCEDURE — 10002800 HB RX 250 W HCPCS: Performed by: INTERNAL MEDICINE

## 2023-06-28 PROCEDURE — 96372 THER/PROPH/DIAG INJ SC/IM: CPT | Performed by: INTERNAL MEDICINE

## 2023-06-28 PROCEDURE — 83540 ASSAY OF IRON: CPT | Performed by: CLINICAL MEDICAL LABORATORY

## 2023-06-28 PROCEDURE — 80053 COMPREHEN METABOLIC PANEL: CPT | Performed by: CLINICAL MEDICAL LABORATORY

## 2023-06-28 PROCEDURE — 85025 COMPLETE CBC W/AUTO DIFF WBC: CPT | Performed by: CLINICAL MEDICAL LABORATORY

## 2023-06-28 RX ADMIN — EPOETIN ALFA-EPBX 20000 UNITS: 40000 INJECTION, SOLUTION INTRAVENOUS; SUBCUTANEOUS at 13:53

## 2023-06-28 ASSESSMENT — PATIENT HEALTH QUESTIONNAIRE - PHQ9
CLINICAL INTERPRETATION OF PHQ2 SCORE: NO FURTHER SCREENING NEEDED
SUM OF ALL RESPONSES TO PHQ9 QUESTIONS 1 AND 2: 0
IS PATIENT ABLE TO COMPLETE PHQ2 OR PHQ9: YES

## 2023-06-28 ASSESSMENT — PAIN SCALES - GENERAL: PAINLEVEL_OUTOF10: 0

## 2023-08-08 DIAGNOSIS — D63.8 ANEMIA, CHRONIC DISEASE: Primary | ICD-10-CM

## 2023-08-09 ENCOUNTER — CLINICAL ABSTRACT (OUTPATIENT)
Dept: HEMATOLOGY/ONCOLOGY | Age: 83
End: 2023-08-09

## 2023-08-09 ENCOUNTER — HOSPITAL ENCOUNTER (OUTPATIENT)
Dept: INFUSION THERAPY | Age: 83
Discharge: STILL ADMITTED OR ADMINISTRATIVE | End: 2023-08-09
Attending: INTERNAL MEDICINE

## 2023-08-09 ENCOUNTER — LAB SERVICES (OUTPATIENT)
Dept: LAB | Age: 83
End: 2023-08-09

## 2023-08-09 VITALS
RESPIRATION RATE: 16 BRPM | TEMPERATURE: 97.5 F | DIASTOLIC BLOOD PRESSURE: 74 MMHG | WEIGHT: 149.03 LBS | HEART RATE: 85 BPM | BODY MASS INDEX: 26.4 KG/M2 | SYSTOLIC BLOOD PRESSURE: 113 MMHG | OXYGEN SATURATION: 100 %

## 2023-08-09 DIAGNOSIS — D63.8 ANEMIA, CHRONIC DISEASE: ICD-10-CM

## 2023-08-09 DIAGNOSIS — D63.8 ANEMIA, CHRONIC DISEASE: Primary | ICD-10-CM

## 2023-08-09 LAB
ACANTHOCYTES BLD QL SMEAR: NORMAL
DEPRECATED RDW RBC: 39.9 FL (ref 39–50)
EOSINOPHIL # BLD: 0.1 K/MCL (ref 0–0.5)
EOSINOPHIL NFR BLD: 2 %
ERYTHROCYTE [DISTWIDTH] IN BLOOD: 19.1 % (ref 11–15)
HCT VFR BLD CALC: 28.9 % (ref 36–46.5)
HGB BLD-MCNC: 9.2 G/DL (ref 12–15.5)
HYPOCHROMIA BLD QL SMEAR: NORMAL
LYMPHOCYTES # BLD: 1.5 K/MCL (ref 1–4)
LYMPHOCYTES NFR BLD: 23 %
MCH RBC QN AUTO: 20.1 PG (ref 26–34)
MCHC RBC AUTO-ENTMCNC: 31.8 G/DL (ref 32–36.5)
MCV RBC AUTO: 63.2 FL (ref 78–100)
METAMYELOCYTES NFR BLD: 1 % (ref 0–2)
MICROCYTES BLD QL SMEAR: NORMAL
MONOCYTES # BLD: 0.7 K/MCL (ref 0.3–0.9)
MONOCYTES NFR BLD: 11 %
NEUTROPHILS # BLD: 3.7 K/MCL (ref 1.8–7.7)
NEUTS BAND NFR BLD: 3 % (ref 0–10)
NEUTS SEG NFR BLD: 58 %
NRBC BLD MANUAL-RTO: 3 /100 WBC
OVALOCYTES BLD QL SMEAR: NORMAL
PLAT MORPH BLD: NORMAL
PLATELET # BLD AUTO: 271 K/MCL (ref 140–450)
RBC # BLD: 4.57 MIL/MCL (ref 4–5.2)
SCHISTOCYTES BLD QL SMEAR: NORMAL
TARGETS BLD QL SMEAR: NORMAL
VARIANT LYMPHS NFR BLD: 2 % (ref 0–5)
WBC # BLD: 6 K/MCL (ref 4.2–11)
WBC MORPH BLD: NORMAL

## 2023-08-09 PROCEDURE — 85027 COMPLETE CBC AUTOMATED: CPT | Performed by: CLINICAL MEDICAL LABORATORY

## 2023-08-09 PROCEDURE — 96372 THER/PROPH/DIAG INJ SC/IM: CPT | Performed by: INTERNAL MEDICINE

## 2023-08-09 PROCEDURE — 36415 COLL VENOUS BLD VENIPUNCTURE: CPT | Performed by: CLINICAL MEDICAL LABORATORY

## 2023-08-09 PROCEDURE — 10002800 HB RX 250 W HCPCS: Performed by: INTERNAL MEDICINE

## 2023-08-09 RX ORDER — ALBUTEROL SULFATE 2.5 MG/3ML
5 SOLUTION RESPIRATORY (INHALATION)
Status: CANCELLED | OUTPATIENT
Start: 2023-10-11

## 2023-08-09 RX ORDER — FAMOTIDINE 10 MG/ML
20 INJECTION, SOLUTION INTRAVENOUS
Status: CANCELLED | OUTPATIENT
Start: 2023-10-04

## 2023-08-09 RX ORDER — DIPHENHYDRAMINE HYDROCHLORIDE 50 MG/ML
50 INJECTION INTRAMUSCULAR; INTRAVENOUS
Status: CANCELLED
Start: 2023-08-09

## 2023-08-09 RX ORDER — DIPHENHYDRAMINE HYDROCHLORIDE 50 MG/ML
50 INJECTION INTRAMUSCULAR; INTRAVENOUS
Status: CANCELLED
Start: 2023-08-15

## 2023-08-09 RX ORDER — ALBUTEROL SULFATE 2.5 MG/3ML
5 SOLUTION RESPIRATORY (INHALATION)
Status: CANCELLED | OUTPATIENT
Start: 2023-08-09

## 2023-08-09 RX ORDER — FAMOTIDINE 10 MG/ML
20 INJECTION, SOLUTION INTRAVENOUS
Status: CANCELLED | OUTPATIENT
Start: 2023-08-09

## 2023-08-09 RX ORDER — METHYLPREDNISOLONE SODIUM SUCCINATE 125 MG/2ML
125 INJECTION, POWDER, LYOPHILIZED, FOR SOLUTION INTRAMUSCULAR; INTRAVENOUS
Status: CANCELLED | OUTPATIENT
Start: 2023-10-11

## 2023-08-09 RX ORDER — DIPHENHYDRAMINE HYDROCHLORIDE 50 MG/ML
50 INJECTION INTRAMUSCULAR; INTRAVENOUS
Status: CANCELLED
Start: 2023-10-11

## 2023-08-09 RX ORDER — METHYLPREDNISOLONE SODIUM SUCCINATE 125 MG/2ML
125 INJECTION, POWDER, LYOPHILIZED, FOR SOLUTION INTRAMUSCULAR; INTRAVENOUS
Status: CANCELLED | OUTPATIENT
Start: 2023-08-15

## 2023-08-09 RX ORDER — ALBUTEROL SULFATE 90 UG/1
2 AEROSOL, METERED RESPIRATORY (INHALATION)
Status: CANCELLED | OUTPATIENT
Start: 2023-10-04

## 2023-08-09 RX ORDER — ALBUTEROL SULFATE 90 UG/1
2 AEROSOL, METERED RESPIRATORY (INHALATION)
Status: CANCELLED | OUTPATIENT
Start: 2023-08-15

## 2023-08-09 RX ORDER — FAMOTIDINE 10 MG/ML
20 INJECTION, SOLUTION INTRAVENOUS
Status: CANCELLED | OUTPATIENT
Start: 2023-10-11

## 2023-08-09 RX ORDER — DIPHENHYDRAMINE HYDROCHLORIDE 50 MG/ML
50 INJECTION INTRAMUSCULAR; INTRAVENOUS
Status: CANCELLED
Start: 2023-10-04

## 2023-08-09 RX ORDER — ALBUTEROL SULFATE 2.5 MG/3ML
5 SOLUTION RESPIRATORY (INHALATION)
Status: CANCELLED | OUTPATIENT
Start: 2023-08-15

## 2023-08-09 RX ORDER — METHYLPREDNISOLONE SODIUM SUCCINATE 125 MG/2ML
125 INJECTION, POWDER, LYOPHILIZED, FOR SOLUTION INTRAMUSCULAR; INTRAVENOUS
Status: CANCELLED | OUTPATIENT
Start: 2023-08-09

## 2023-08-09 RX ORDER — ALBUTEROL SULFATE 2.5 MG/3ML
5 SOLUTION RESPIRATORY (INHALATION)
Status: CANCELLED | OUTPATIENT
Start: 2023-10-04

## 2023-08-09 RX ORDER — ALBUTEROL SULFATE 90 UG/1
2 AEROSOL, METERED RESPIRATORY (INHALATION)
Status: CANCELLED | OUTPATIENT
Start: 2023-08-09

## 2023-08-09 RX ORDER — ALBUTEROL SULFATE 90 UG/1
2 AEROSOL, METERED RESPIRATORY (INHALATION)
Status: CANCELLED | OUTPATIENT
Start: 2023-10-11

## 2023-08-09 RX ORDER — FAMOTIDINE 10 MG/ML
20 INJECTION, SOLUTION INTRAVENOUS
Status: CANCELLED | OUTPATIENT
Start: 2023-08-15

## 2023-08-09 RX ORDER — METHYLPREDNISOLONE SODIUM SUCCINATE 125 MG/2ML
125 INJECTION, POWDER, LYOPHILIZED, FOR SOLUTION INTRAMUSCULAR; INTRAVENOUS
Status: CANCELLED | OUTPATIENT
Start: 2023-10-04

## 2023-08-09 RX ADMIN — EPOETIN ALFA-EPBX 20000 UNITS: 40000 INJECTION, SOLUTION INTRAVENOUS; SUBCUTANEOUS at 14:10

## 2023-08-09 ASSESSMENT — PATIENT HEALTH QUESTIONNAIRE - PHQ9
SUM OF ALL RESPONSES TO PHQ9 QUESTIONS 1 AND 2: 0
1. LITTLE INTEREST OR PLEASURE IN DOING THINGS: NOT AT ALL
CLINICAL INTERPRETATION OF PHQ2 SCORE: NO FURTHER SCREENING NEEDED
IS PATIENT ABLE TO COMPLETE PHQ2 OR PHQ9: YES
SUM OF ALL RESPONSES TO PHQ9 QUESTIONS 1 AND 2: 0
2. FEELING DOWN, DEPRESSED OR HOPELESS: NOT AT ALL

## 2023-08-09 ASSESSMENT — PAIN SCALES - GENERAL: PAINLEVEL_OUTOF10: 0

## 2023-08-16 ENCOUNTER — APPOINTMENT (OUTPATIENT)
Dept: INFUSION THERAPY | Age: 83
End: 2023-08-16
Attending: INTERNAL MEDICINE

## 2023-08-23 ENCOUNTER — APPOINTMENT (OUTPATIENT)
Dept: INFUSION THERAPY | Age: 83
End: 2023-08-23
Attending: INTERNAL MEDICINE

## 2023-08-30 ENCOUNTER — APPOINTMENT (OUTPATIENT)
Dept: INFUSION THERAPY | Age: 83
End: 2023-08-30
Attending: INTERNAL MEDICINE

## 2023-09-06 ENCOUNTER — CLINICAL ABSTRACT (OUTPATIENT)
Dept: INFUSION THERAPY | Age: 83
End: 2023-09-06

## 2023-09-06 ENCOUNTER — CLINICAL ABSTRACT (OUTPATIENT)
Dept: HEMATOLOGY/ONCOLOGY | Age: 83
End: 2023-09-06

## 2023-09-06 ENCOUNTER — LAB SERVICES (OUTPATIENT)
Dept: LAB | Age: 83
End: 2023-09-06

## 2023-09-06 ENCOUNTER — HOSPITAL ENCOUNTER (OUTPATIENT)
Dept: INFUSION THERAPY | Age: 83
Discharge: STILL ADMITTED OR ADMINISTRATIVE | End: 2023-09-06
Attending: INTERNAL MEDICINE

## 2023-09-06 VITALS
DIASTOLIC BLOOD PRESSURE: 69 MMHG | WEIGHT: 148.15 LBS | TEMPERATURE: 96.8 F | OXYGEN SATURATION: 99 % | SYSTOLIC BLOOD PRESSURE: 115 MMHG | HEART RATE: 84 BPM | BODY MASS INDEX: 26.24 KG/M2 | RESPIRATION RATE: 16 BRPM

## 2023-09-06 DIAGNOSIS — D63.8 ANEMIA, CHRONIC DISEASE: Primary | ICD-10-CM

## 2023-09-06 DIAGNOSIS — D63.8 ANEMIA, CHRONIC DISEASE: ICD-10-CM

## 2023-09-06 LAB
ALBUMIN SERPL-MCNC: 3.8 G/DL (ref 3.6–5.1)
ALBUMIN/GLOB SERPL: 1 {RATIO} (ref 1–2.4)
ALP SERPL-CCNC: 51 UNITS/L (ref 45–117)
ALT SERPL-CCNC: 9 UNITS/L
ANION GAP SERPL CALC-SCNC: 11 MMOL/L (ref 7–19)
AST SERPL-CCNC: 23 UNITS/L
BASOPHILS # BLD: 0 K/MCL (ref 0–0.3)
BASOPHILS NFR BLD: 0 %
BILIRUB SERPL-MCNC: 0.8 MG/DL (ref 0.2–1)
BUN SERPL-MCNC: 18 MG/DL (ref 6–20)
BUN/CREAT SERPL: 18 (ref 7–25)
CALCIUM SERPL-MCNC: 9.6 MG/DL (ref 8.4–10.2)
CHLORIDE SERPL-SCNC: 108 MMOL/L (ref 97–110)
CO2 SERPL-SCNC: 25 MMOL/L (ref 21–32)
CREAT SERPL-MCNC: 1 MG/DL (ref 0.51–0.95)
DEPRECATED RDW RBC: 42 FL (ref 39–50)
EGFRCR SERPLBLD CKD-EPI 2021: 56 ML/MIN/{1.73_M2}
EOSINOPHIL # BLD: 0.1 K/MCL (ref 0–0.5)
EOSINOPHIL NFR BLD: 1 %
ERYTHROCYTE [DISTWIDTH] IN BLOOD: 19.5 % (ref 11–15)
FASTING DURATION TIME PATIENT: ABNORMAL H
FERRITIN SERPL-MCNC: 155 NG/ML (ref 8–252)
GLOBULIN SER-MCNC: 3.7 G/DL (ref 2–4)
GLUCOSE SERPL-MCNC: 96 MG/DL (ref 70–99)
HCT VFR BLD CALC: 30.2 % (ref 36–46.5)
HGB BLD-MCNC: 9.4 G/DL (ref 12–15.5)
IMM GRANULOCYTES # BLD AUTO: 0.1 K/MCL (ref 0–0.2)
IMM GRANULOCYTES # BLD: 1 %
IRON SATN MFR SERPL: 36 % (ref 15–45)
IRON SERPL-MCNC: 99 MCG/DL (ref 50–170)
LYMPHOCYTES # BLD: 2.1 K/MCL (ref 1–4)
LYMPHOCYTES NFR BLD: 22 %
MCH RBC QN AUTO: 20.2 PG (ref 26–34)
MCHC RBC AUTO-ENTMCNC: 31.1 G/DL (ref 32–36.5)
MCV RBC AUTO: 64.8 FL (ref 78–100)
MONOCYTES # BLD: 0.8 K/MCL (ref 0.3–0.9)
MONOCYTES NFR BLD: 8 %
NEUTROPHILS # BLD: 6.6 K/MCL (ref 1.8–7.7)
NEUTROPHILS NFR BLD: 68 %
NRBC BLD MANUAL-RTO: 3 /100 WBC
PLATELET # BLD AUTO: 275 K/MCL (ref 140–450)
POTASSIUM SERPL-SCNC: 4.8 MMOL/L (ref 3.4–5.1)
PROT SERPL-MCNC: 7.5 G/DL (ref 6.4–8.2)
RBC # BLD: 4.66 MIL/MCL (ref 4–5.2)
SODIUM SERPL-SCNC: 139 MMOL/L (ref 135–145)
TIBC SERPL-MCNC: 276 MCG/DL (ref 250–450)
WBC # BLD: 9.7 K/MCL (ref 4.2–11)

## 2023-09-06 PROCEDURE — 10002800 HB RX 250 W HCPCS: Performed by: INTERNAL MEDICINE

## 2023-09-06 PROCEDURE — 83550 IRON BINDING TEST: CPT | Performed by: CLINICAL MEDICAL LABORATORY

## 2023-09-06 PROCEDURE — 85025 COMPLETE CBC W/AUTO DIFF WBC: CPT | Performed by: CLINICAL MEDICAL LABORATORY

## 2023-09-06 PROCEDURE — 80053 COMPREHEN METABOLIC PANEL: CPT | Performed by: CLINICAL MEDICAL LABORATORY

## 2023-09-06 PROCEDURE — 96372 THER/PROPH/DIAG INJ SC/IM: CPT | Performed by: INTERNAL MEDICINE

## 2023-09-06 PROCEDURE — 83540 ASSAY OF IRON: CPT | Performed by: CLINICAL MEDICAL LABORATORY

## 2023-09-06 PROCEDURE — 82728 ASSAY OF FERRITIN: CPT | Performed by: CLINICAL MEDICAL LABORATORY

## 2023-09-06 PROCEDURE — 36415 COLL VENOUS BLD VENIPUNCTURE: CPT | Performed by: CLINICAL MEDICAL LABORATORY

## 2023-09-06 RX ORDER — METHYLPREDNISOLONE SODIUM SUCCINATE 125 MG/2ML
125 INJECTION, POWDER, LYOPHILIZED, FOR SOLUTION INTRAMUSCULAR; INTRAVENOUS
Status: CANCELLED | OUTPATIENT
Start: 2023-09-06

## 2023-09-06 RX ORDER — ALBUTEROL SULFATE 2.5 MG/3ML
5 SOLUTION RESPIRATORY (INHALATION)
OUTPATIENT
Start: 2023-11-02

## 2023-09-06 RX ORDER — ALBUTEROL SULFATE 90 UG/1
2 AEROSOL, METERED RESPIRATORY (INHALATION)
OUTPATIENT
Start: 2023-11-02

## 2023-09-06 RX ORDER — DIPHENHYDRAMINE HYDROCHLORIDE 50 MG/ML
50 INJECTION INTRAMUSCULAR; INTRAVENOUS
Start: 2023-11-02

## 2023-09-06 RX ORDER — ALBUTEROL SULFATE 2.5 MG/3ML
5 SOLUTION RESPIRATORY (INHALATION)
OUTPATIENT
Start: 2023-11-16

## 2023-09-06 RX ORDER — METHYLPREDNISOLONE SODIUM SUCCINATE 125 MG/2ML
125 INJECTION, POWDER, LYOPHILIZED, FOR SOLUTION INTRAMUSCULAR; INTRAVENOUS
OUTPATIENT
Start: 2023-11-02

## 2023-09-06 RX ORDER — FAMOTIDINE 10 MG/ML
20 INJECTION, SOLUTION INTRAVENOUS
OUTPATIENT
Start: 2023-11-23

## 2023-09-06 RX ORDER — FAMOTIDINE 10 MG/ML
20 INJECTION, SOLUTION INTRAVENOUS
OUTPATIENT
Start: 2023-11-16

## 2023-09-06 RX ORDER — DIPHENHYDRAMINE HYDROCHLORIDE 50 MG/ML
50 INJECTION INTRAMUSCULAR; INTRAVENOUS
Start: 2023-11-16

## 2023-09-06 RX ORDER — FAMOTIDINE 10 MG/ML
20 INJECTION, SOLUTION INTRAVENOUS
OUTPATIENT
Start: 2023-11-02

## 2023-09-06 RX ORDER — FAMOTIDINE 10 MG/ML
20 INJECTION, SOLUTION INTRAVENOUS
OUTPATIENT
Start: 2023-11-09

## 2023-09-06 RX ORDER — METHYLPREDNISOLONE SODIUM SUCCINATE 125 MG/2ML
125 INJECTION, POWDER, LYOPHILIZED, FOR SOLUTION INTRAMUSCULAR; INTRAVENOUS
OUTPATIENT
Start: 2023-11-16

## 2023-09-06 RX ORDER — METHYLPREDNISOLONE SODIUM SUCCINATE 125 MG/2ML
125 INJECTION, POWDER, LYOPHILIZED, FOR SOLUTION INTRAMUSCULAR; INTRAVENOUS
OUTPATIENT
Start: 2023-11-09

## 2023-09-06 RX ORDER — ALBUTEROL SULFATE 2.5 MG/3ML
5 SOLUTION RESPIRATORY (INHALATION)
Status: CANCELLED | OUTPATIENT
Start: 2023-09-06

## 2023-09-06 RX ORDER — ALBUTEROL SULFATE 2.5 MG/3ML
5 SOLUTION RESPIRATORY (INHALATION)
OUTPATIENT
Start: 2023-11-23

## 2023-09-06 RX ORDER — ALBUTEROL SULFATE 2.5 MG/3ML
5 SOLUTION RESPIRATORY (INHALATION)
OUTPATIENT
Start: 2023-11-09

## 2023-09-06 RX ORDER — ALBUTEROL SULFATE 90 UG/1
2 AEROSOL, METERED RESPIRATORY (INHALATION)
Status: CANCELLED | OUTPATIENT
Start: 2023-09-06

## 2023-09-06 RX ORDER — DIPHENHYDRAMINE HYDROCHLORIDE 50 MG/ML
50 INJECTION INTRAMUSCULAR; INTRAVENOUS
Start: 2023-11-09

## 2023-09-06 RX ORDER — DIPHENHYDRAMINE HYDROCHLORIDE 50 MG/ML
50 INJECTION INTRAMUSCULAR; INTRAVENOUS
Start: 2023-11-23

## 2023-09-06 RX ORDER — ALBUTEROL SULFATE 90 UG/1
2 AEROSOL, METERED RESPIRATORY (INHALATION)
OUTPATIENT
Start: 2023-11-23

## 2023-09-06 RX ORDER — FAMOTIDINE 10 MG/ML
20 INJECTION, SOLUTION INTRAVENOUS
Status: CANCELLED | OUTPATIENT
Start: 2023-09-06

## 2023-09-06 RX ORDER — ALBUTEROL SULFATE 90 UG/1
2 AEROSOL, METERED RESPIRATORY (INHALATION)
OUTPATIENT
Start: 2023-11-16

## 2023-09-06 RX ORDER — ALBUTEROL SULFATE 90 UG/1
2 AEROSOL, METERED RESPIRATORY (INHALATION)
OUTPATIENT
Start: 2023-11-09

## 2023-09-06 RX ORDER — DIPHENHYDRAMINE HYDROCHLORIDE 50 MG/ML
50 INJECTION INTRAMUSCULAR; INTRAVENOUS
Status: CANCELLED
Start: 2023-09-06

## 2023-09-06 RX ORDER — METHYLPREDNISOLONE SODIUM SUCCINATE 125 MG/2ML
125 INJECTION, POWDER, LYOPHILIZED, FOR SOLUTION INTRAMUSCULAR; INTRAVENOUS
OUTPATIENT
Start: 2023-11-23

## 2023-09-06 RX ADMIN — EPOETIN ALFA-EPBX 20000 UNITS: 40000 INJECTION, SOLUTION INTRAVENOUS; SUBCUTANEOUS at 14:12

## 2023-09-06 ASSESSMENT — PATIENT HEALTH QUESTIONNAIRE - PHQ9
2. FEELING DOWN, DEPRESSED OR HOPELESS: NOT AT ALL
SUM OF ALL RESPONSES TO PHQ9 QUESTIONS 1 AND 2: 0
IS PATIENT ABLE TO COMPLETE PHQ2 OR PHQ9: YES
1. LITTLE INTEREST OR PLEASURE IN DOING THINGS: NOT AT ALL
SUM OF ALL RESPONSES TO PHQ9 QUESTIONS 1 AND 2: 0
CLINICAL INTERPRETATION OF PHQ2 SCORE: NO FURTHER SCREENING NEEDED

## 2023-09-06 ASSESSMENT — PAIN SCALES - GENERAL: PAINLEVEL_OUTOF10: 0

## 2023-09-30 ENCOUNTER — EXTERNAL RECORD (OUTPATIENT)
Dept: HEALTH INFORMATION MANAGEMENT | Facility: OTHER | Age: 83
End: 2023-09-30

## 2023-10-04 ENCOUNTER — LAB SERVICES (OUTPATIENT)
Dept: LAB | Age: 83
End: 2023-10-04

## 2023-10-04 ENCOUNTER — HOSPITAL ENCOUNTER (OUTPATIENT)
Dept: INFUSION THERAPY | Age: 83
Discharge: STILL ADMITTED OR ADMINISTRATIVE | End: 2023-10-04
Attending: INTERNAL MEDICINE

## 2023-10-04 ENCOUNTER — CLINICAL DOCUMENTATION (OUTPATIENT)
Dept: HEMATOLOGY/ONCOLOGY | Age: 83
End: 2023-10-04

## 2023-10-04 VITALS
HEART RATE: 94 BPM | TEMPERATURE: 97.5 F | WEIGHT: 149.69 LBS | BODY MASS INDEX: 26.52 KG/M2 | RESPIRATION RATE: 16 BRPM | SYSTOLIC BLOOD PRESSURE: 110 MMHG | OXYGEN SATURATION: 99 % | DIASTOLIC BLOOD PRESSURE: 68 MMHG

## 2023-10-04 DIAGNOSIS — D63.8 ANEMIA, CHRONIC DISEASE: Primary | ICD-10-CM

## 2023-10-04 DIAGNOSIS — D63.8 ANEMIA, CHRONIC DISEASE: ICD-10-CM

## 2023-10-04 LAB
BASOPHILS # BLD: 0 K/MCL (ref 0–0.3)
BASOPHILS NFR BLD: 1 %
DEPRECATED RDW RBC: 40.3 FL (ref 39–50)
EOSINOPHIL # BLD: 0.1 K/MCL (ref 0–0.5)
EOSINOPHIL NFR BLD: 1 %
ERYTHROCYTE [DISTWIDTH] IN BLOOD: 19.2 % (ref 11–15)
HCT VFR BLD CALC: 27.9 % (ref 36–46.5)
HGB BLD-MCNC: 8.9 G/DL (ref 12–15.5)
IMM GRANULOCYTES # BLD AUTO: 0 K/MCL (ref 0–0.2)
IMM GRANULOCYTES # BLD: 0 %
LYMPHOCYTES # BLD: 2 K/MCL (ref 1–4)
LYMPHOCYTES NFR BLD: 32 %
MCH RBC QN AUTO: 20.4 PG (ref 26–34)
MCHC RBC AUTO-ENTMCNC: 31.9 G/DL (ref 32–36.5)
MCV RBC AUTO: 63.8 FL (ref 78–100)
MONOCYTES # BLD: 0.6 K/MCL (ref 0.3–0.9)
MONOCYTES NFR BLD: 10 %
NEUTROPHILS # BLD: 3.4 K/MCL (ref 1.8–7.7)
NEUTROPHILS NFR BLD: 56 %
NRBC BLD MANUAL-RTO: 4 /100 WBC
PLATELET # BLD AUTO: 259 K/MCL (ref 140–450)
RBC # BLD: 4.37 MIL/MCL (ref 4–5.2)
WBC # BLD: 6.1 K/MCL (ref 4.2–11)

## 2023-10-04 PROCEDURE — 96372 THER/PROPH/DIAG INJ SC/IM: CPT | Performed by: INTERNAL MEDICINE

## 2023-10-04 PROCEDURE — 85025 COMPLETE CBC W/AUTO DIFF WBC: CPT | Performed by: CLINICAL MEDICAL LABORATORY

## 2023-10-04 PROCEDURE — 10002800 HB RX 250 W HCPCS: Performed by: INTERNAL MEDICINE

## 2023-10-04 PROCEDURE — 36415 COLL VENOUS BLD VENIPUNCTURE: CPT | Performed by: CLINICAL MEDICAL LABORATORY

## 2023-10-04 RX ADMIN — EPOETIN ALFA-EPBX 20000 UNITS: 40000 INJECTION, SOLUTION INTRAVENOUS; SUBCUTANEOUS at 13:35

## 2023-10-04 ASSESSMENT — PATIENT HEALTH QUESTIONNAIRE - PHQ9
CLINICAL INTERPRETATION OF PHQ2 SCORE: NO FURTHER SCREENING NEEDED
SUM OF ALL RESPONSES TO PHQ9 QUESTIONS 1 AND 2: 0
1. LITTLE INTEREST OR PLEASURE IN DOING THINGS: NOT AT ALL
2. FEELING DOWN, DEPRESSED OR HOPELESS: NOT AT ALL
SUM OF ALL RESPONSES TO PHQ9 QUESTIONS 1 AND 2: 0

## 2023-10-04 ASSESSMENT — PAIN SCALES - GENERAL: PAINLEVEL_OUTOF10: 0

## 2023-11-01 ENCOUNTER — LAB SERVICES (OUTPATIENT)
Dept: LAB | Age: 83
End: 2023-11-01

## 2023-11-01 ENCOUNTER — HOSPITAL ENCOUNTER (OUTPATIENT)
Dept: INFUSION THERAPY | Age: 83
Discharge: STILL ADMITTED OR ADMINISTRATIVE | End: 2023-11-01
Attending: INTERNAL MEDICINE

## 2023-11-01 ENCOUNTER — OFFICE VISIT (OUTPATIENT)
Dept: HEMATOLOGY/ONCOLOGY | Age: 83
End: 2023-11-01
Attending: INTERNAL MEDICINE

## 2023-11-01 VITALS
RESPIRATION RATE: 17 BRPM | HEART RATE: 77 BPM | WEIGHT: 149.69 LBS | BODY MASS INDEX: 26.52 KG/M2 | DIASTOLIC BLOOD PRESSURE: 78 MMHG | TEMPERATURE: 97.9 F | OXYGEN SATURATION: 100 % | HEIGHT: 63 IN | SYSTOLIC BLOOD PRESSURE: 120 MMHG

## 2023-11-01 VITALS
WEIGHT: 149.69 LBS | BODY MASS INDEX: 26.52 KG/M2 | HEART RATE: 77 BPM | DIASTOLIC BLOOD PRESSURE: 78 MMHG | SYSTOLIC BLOOD PRESSURE: 120 MMHG | OXYGEN SATURATION: 100 % | TEMPERATURE: 97.9 F

## 2023-11-01 DIAGNOSIS — D63.8 ANEMIA, CHRONIC DISEASE: ICD-10-CM

## 2023-11-01 DIAGNOSIS — D63.8 ANEMIA, CHRONIC DISEASE: Primary | ICD-10-CM

## 2023-11-01 DIAGNOSIS — Z85.43 HISTORY OF OVARIAN CANCER: Primary | ICD-10-CM

## 2023-11-01 LAB
BASOPHILS # BLD: 0 K/MCL (ref 0–0.3)
BASOPHILS NFR BLD: 1 %
DEPRECATED RDW RBC: 40.2 FL (ref 39–50)
EOSINOPHIL # BLD: 0.1 K/MCL (ref 0–0.5)
EOSINOPHIL NFR BLD: 2 %
ERYTHROCYTE [DISTWIDTH] IN BLOOD: 18.9 % (ref 11–15)
HCT VFR BLD CALC: 28.1 % (ref 36–46.5)
HGB BLD-MCNC: 8.9 G/DL (ref 12–15.5)
IMM GRANULOCYTES # BLD AUTO: 0 K/MCL (ref 0–0.2)
IMM GRANULOCYTES # BLD: 1 %
LYMPHOCYTES # BLD: 1.7 K/MCL (ref 1–4)
LYMPHOCYTES NFR BLD: 32 %
MCH RBC QN AUTO: 20.2 PG (ref 26–34)
MCHC RBC AUTO-ENTMCNC: 31.7 G/DL (ref 32–36.5)
MCV RBC AUTO: 63.9 FL (ref 78–100)
MONOCYTES # BLD: 0.5 K/MCL (ref 0.3–0.9)
MONOCYTES NFR BLD: 10 %
NEUTROPHILS # BLD: 2.8 K/MCL (ref 1.8–7.7)
NEUTROPHILS NFR BLD: 54 %
NRBC BLD MANUAL-RTO: 4 /100 WBC
PLATELET # BLD AUTO: 308 K/MCL (ref 140–450)
RBC # BLD: 4.4 MIL/MCL (ref 4–5.2)
WBC # BLD: 5.2 K/MCL (ref 4.2–11)

## 2023-11-01 PROCEDURE — 99214 OFFICE O/P EST MOD 30 MIN: CPT | Performed by: INTERNAL MEDICINE

## 2023-11-01 PROCEDURE — 99211 OFF/OP EST MAY X REQ PHY/QHP: CPT

## 2023-11-01 PROCEDURE — 36415 COLL VENOUS BLD VENIPUNCTURE: CPT | Performed by: CLINICAL MEDICAL LABORATORY

## 2023-11-01 PROCEDURE — 85025 COMPLETE CBC W/AUTO DIFF WBC: CPT | Performed by: CLINICAL MEDICAL LABORATORY

## 2023-11-01 PROCEDURE — 10002800 HB RX 250 W HCPCS: Performed by: INTERNAL MEDICINE

## 2023-11-01 PROCEDURE — 96372 THER/PROPH/DIAG INJ SC/IM: CPT | Performed by: INTERNAL MEDICINE

## 2023-11-01 RX ADMIN — EPOETIN ALFA-EPBX 20000 UNITS: 40000 INJECTION, SOLUTION INTRAVENOUS; SUBCUTANEOUS at 13:26

## 2023-11-01 ASSESSMENT — PATIENT HEALTH QUESTIONNAIRE - PHQ9
1. LITTLE INTEREST OR PLEASURE IN DOING THINGS: NOT AT ALL
SUM OF ALL RESPONSES TO PHQ9 QUESTIONS 1 AND 2: 0
CLINICAL INTERPRETATION OF PHQ2 SCORE: NO FURTHER SCREENING NEEDED
CLINICAL INTERPRETATION OF PHQ2 SCORE: NO FURTHER SCREENING NEEDED
2. FEELING DOWN, DEPRESSED OR HOPELESS: NOT AT ALL
IS PATIENT ABLE TO COMPLETE PHQ2 OR PHQ9: YES

## 2023-11-01 ASSESSMENT — PAIN SCALES - GENERAL
PAINLEVEL: 0
PAINLEVEL_OUTOF10: 0

## 2023-11-01 ASSESSMENT — ENCOUNTER SYMPTOMS
HEMATOLOGIC/LYMPHATIC NEGATIVE: 1
GASTROINTESTINAL NEGATIVE: 1
ENDOCRINE NEGATIVE: 1
ALLERGIC/IMMUNOLOGIC NEGATIVE: 1
RESPIRATORY NEGATIVE: 1
PSYCHIATRIC NEGATIVE: 1
CONSTITUTIONAL NEGATIVE: 1
EYES NEGATIVE: 1
NEUROLOGICAL NEGATIVE: 1

## 2023-11-24 DIAGNOSIS — D63.8 ANEMIA, CHRONIC DISEASE: Primary | ICD-10-CM

## 2023-11-29 ENCOUNTER — LAB SERVICES (OUTPATIENT)
Dept: LAB | Age: 83
End: 2023-11-29

## 2023-11-29 ENCOUNTER — HOSPITAL ENCOUNTER (OUTPATIENT)
Dept: INFUSION THERAPY | Age: 83
Discharge: STILL ADMITTED OR ADMINISTRATIVE | End: 2023-11-29
Attending: INTERNAL MEDICINE

## 2023-11-29 VITALS
DIASTOLIC BLOOD PRESSURE: 64 MMHG | HEART RATE: 87 BPM | SYSTOLIC BLOOD PRESSURE: 111 MMHG | OXYGEN SATURATION: 97 % | WEIGHT: 149.47 LBS | RESPIRATION RATE: 16 BRPM | BODY MASS INDEX: 26.48 KG/M2 | TEMPERATURE: 98.1 F

## 2023-11-29 DIAGNOSIS — D63.8 ANEMIA, CHRONIC DISEASE: Primary | ICD-10-CM

## 2023-11-29 DIAGNOSIS — Z85.43 HISTORY OF OVARIAN CANCER: ICD-10-CM

## 2023-11-29 DIAGNOSIS — D63.8 ANEMIA, CHRONIC DISEASE: ICD-10-CM

## 2023-11-29 LAB
ALBUMIN SERPL-MCNC: 3.6 G/DL (ref 3.6–5.1)
ALBUMIN/GLOB SERPL: 1.1 {RATIO} (ref 1–2.4)
ALP SERPL-CCNC: 50 UNITS/L (ref 45–117)
ALT SERPL-CCNC: 12 UNITS/L
ANION GAP SERPL CALC-SCNC: 9 MMOL/L (ref 7–19)
AST SERPL-CCNC: 20 UNITS/L
BASOPHILS # BLD: 0 K/MCL (ref 0–0.3)
BASOPHILS NFR BLD: 1 %
BILIRUB SERPL-MCNC: 0.8 MG/DL (ref 0.2–1)
BUN SERPL-MCNC: 18 MG/DL (ref 6–20)
BUN/CREAT SERPL: 22 (ref 7–25)
CALCIUM SERPL-MCNC: 9.3 MG/DL (ref 8.4–10.2)
CANCER AG125 SERPL-ACNC: 7 UNITS/ML (ref 0–35)
CHLORIDE SERPL-SCNC: 111 MMOL/L (ref 97–110)
CO2 SERPL-SCNC: 27 MMOL/L (ref 21–32)
CREAT SERPL-MCNC: 0.81 MG/DL (ref 0.51–0.95)
DEPRECATED RDW RBC: 39.7 FL (ref 39–50)
EGFRCR SERPLBLD CKD-EPI 2021: 72 ML/MIN/{1.73_M2}
EOSINOPHIL # BLD: 0.1 K/MCL (ref 0–0.5)
EOSINOPHIL NFR BLD: 1 %
ERYTHROCYTE [DISTWIDTH] IN BLOOD: 19 % (ref 11–15)
FASTING DURATION TIME PATIENT: ABNORMAL H
GLOBULIN SER-MCNC: 3.4 G/DL (ref 2–4)
GLUCOSE SERPL-MCNC: 92 MG/DL (ref 70–99)
HCT VFR BLD CALC: 27.7 % (ref 36–46.5)
HGB BLD-MCNC: 9 G/DL (ref 12–15.5)
IMM GRANULOCYTES # BLD AUTO: 0 K/MCL (ref 0–0.2)
IMM GRANULOCYTES # BLD: 1 %
LYMPHOCYTES # BLD: 1.8 K/MCL (ref 1–4)
LYMPHOCYTES NFR BLD: 34 %
MCH RBC QN AUTO: 20.2 PG (ref 26–34)
MCHC RBC AUTO-ENTMCNC: 32.5 G/DL (ref 32–36.5)
MCV RBC AUTO: 62.2 FL (ref 78–100)
MONOCYTES # BLD: 0.6 K/MCL (ref 0.3–0.9)
MONOCYTES NFR BLD: 11 %
NEUTROPHILS # BLD: 2.9 K/MCL (ref 1.8–7.7)
NEUTROPHILS NFR BLD: 52 %
NRBC BLD MANUAL-RTO: 3 /100 WBC
PLATELET # BLD AUTO: 271 K/MCL (ref 140–450)
POTASSIUM SERPL-SCNC: 4.8 MMOL/L (ref 3.4–5.1)
PROT SERPL-MCNC: 7 G/DL (ref 6.4–8.2)
RBC # BLD: 4.45 MIL/MCL (ref 4–5.2)
SODIUM SERPL-SCNC: 142 MMOL/L (ref 135–145)
WBC # BLD: 5.5 K/MCL (ref 4.2–11)

## 2023-11-29 PROCEDURE — 85025 COMPLETE CBC W/AUTO DIFF WBC: CPT | Performed by: CLINICAL MEDICAL LABORATORY

## 2023-11-29 PROCEDURE — 36415 COLL VENOUS BLD VENIPUNCTURE: CPT | Performed by: CLINICAL MEDICAL LABORATORY

## 2023-11-29 PROCEDURE — 10002800 HB RX 250 W HCPCS: Performed by: INTERNAL MEDICINE

## 2023-11-29 PROCEDURE — 80053 COMPREHEN METABOLIC PANEL: CPT | Performed by: CLINICAL MEDICAL LABORATORY

## 2023-11-29 PROCEDURE — 96372 THER/PROPH/DIAG INJ SC/IM: CPT | Performed by: INTERNAL MEDICINE

## 2023-11-29 PROCEDURE — 86304 IMMUNOASSAY TUMOR CA 125: CPT | Performed by: CLINICAL MEDICAL LABORATORY

## 2023-11-29 RX ADMIN — EPOETIN ALFA-EPBX 20000 UNITS: 40000 INJECTION, SOLUTION INTRAVENOUS; SUBCUTANEOUS at 12:11

## 2023-11-29 ASSESSMENT — PAIN SCALES - GENERAL: PAINLEVEL_OUTOF10: 0

## 2023-11-29 ASSESSMENT — PATIENT HEALTH QUESTIONNAIRE - PHQ9
IS PATIENT ABLE TO COMPLETE PHQ2 OR PHQ9: YES
SUM OF ALL RESPONSES TO PHQ9 QUESTIONS 1 AND 2: 0
CLINICAL INTERPRETATION OF PHQ2 SCORE: NO FURTHER SCREENING NEEDED

## 2024-01-10 ENCOUNTER — HOSPITAL ENCOUNTER (OUTPATIENT)
Dept: INFUSION THERAPY | Age: 84
Discharge: STILL ADMITTED OR ADMINISTRATIVE | End: 2024-01-10
Attending: INTERNAL MEDICINE

## 2024-01-10 ENCOUNTER — LAB SERVICES (OUTPATIENT)
Dept: LAB | Age: 84
End: 2024-01-10

## 2024-01-10 VITALS
WEIGHT: 149.69 LBS | SYSTOLIC BLOOD PRESSURE: 111 MMHG | BODY MASS INDEX: 26.52 KG/M2 | HEART RATE: 78 BPM | DIASTOLIC BLOOD PRESSURE: 66 MMHG | TEMPERATURE: 98.2 F | RESPIRATION RATE: 16 BRPM | OXYGEN SATURATION: 97 %

## 2024-01-10 DIAGNOSIS — D63.8 ANEMIA, CHRONIC DISEASE: ICD-10-CM

## 2024-01-10 DIAGNOSIS — D63.8 ANEMIA, CHRONIC DISEASE: Primary | ICD-10-CM

## 2024-01-10 LAB
ALBUMIN SERPL-MCNC: 3.5 G/DL (ref 3.6–5.1)
ALBUMIN/GLOB SERPL: 1 {RATIO} (ref 1–2.4)
ALP SERPL-CCNC: 50 UNITS/L (ref 45–117)
ALT SERPL-CCNC: 11 UNITS/L
ANION GAP SERPL CALC-SCNC: 9 MMOL/L (ref 7–19)
AST SERPL-CCNC: 19 UNITS/L
BASOPHILS # BLD: 0 K/MCL (ref 0–0.3)
BASOPHILS NFR BLD: 1 %
BILIRUB SERPL-MCNC: 0.7 MG/DL (ref 0.2–1)
BUN SERPL-MCNC: 15 MG/DL (ref 6–20)
BUN/CREAT SERPL: 16 (ref 7–25)
CALCIUM SERPL-MCNC: 9.6 MG/DL (ref 8.4–10.2)
CHLORIDE SERPL-SCNC: 111 MMOL/L (ref 97–110)
CO2 SERPL-SCNC: 25 MMOL/L (ref 21–32)
CREAT SERPL-MCNC: 0.91 MG/DL (ref 0.51–0.95)
DEPRECATED RDW RBC: 41.3 FL (ref 39–50)
EGFRCR SERPLBLD CKD-EPI 2021: 62 ML/MIN/{1.73_M2}
EOSINOPHIL # BLD: 0.1 K/MCL (ref 0–0.5)
EOSINOPHIL NFR BLD: 1 %
ERYTHROCYTE [DISTWIDTH] IN BLOOD: 19.4 % (ref 11–15)
FASTING DURATION TIME PATIENT: ABNORMAL H
GLOBULIN SER-MCNC: 3.5 G/DL (ref 2–4)
GLUCOSE SERPL-MCNC: 91 MG/DL (ref 70–99)
HCT VFR BLD CALC: 27.4 % (ref 36–46.5)
HGB BLD-MCNC: 8.8 G/DL (ref 12–15.5)
IMM GRANULOCYTES # BLD AUTO: 0 K/MCL (ref 0–0.2)
IMM GRANULOCYTES # BLD: 1 %
IRON SERPL-MCNC: 109 MCG/DL (ref 50–170)
LYMPHOCYTES # BLD: 1.9 K/MCL (ref 1–4)
LYMPHOCYTES NFR BLD: 34 %
MCH RBC QN AUTO: 20.6 PG (ref 26–34)
MCHC RBC AUTO-ENTMCNC: 32.1 G/DL (ref 32–36.5)
MCV RBC AUTO: 64 FL (ref 78–100)
MONOCYTES # BLD: 0.6 K/MCL (ref 0.3–0.9)
MONOCYTES NFR BLD: 11 %
NEUTROPHILS # BLD: 3.1 K/MCL (ref 1.8–7.7)
NEUTROPHILS NFR BLD: 52 %
NRBC BLD MANUAL-RTO: 3 /100 WBC
PLATELET # BLD AUTO: 271 K/MCL (ref 140–450)
POTASSIUM SERPL-SCNC: 5.2 MMOL/L (ref 3.4–5.1)
PROT SERPL-MCNC: 7 G/DL (ref 6.4–8.2)
RBC # BLD: 4.28 MIL/MCL (ref 4–5.2)
SODIUM SERPL-SCNC: 140 MMOL/L (ref 135–145)
WBC # BLD: 5.7 K/MCL (ref 4.2–11)

## 2024-01-10 PROCEDURE — 83540 ASSAY OF IRON: CPT | Performed by: CLINICAL MEDICAL LABORATORY

## 2024-01-10 PROCEDURE — 85025 COMPLETE CBC W/AUTO DIFF WBC: CPT | Performed by: CLINICAL MEDICAL LABORATORY

## 2024-01-10 PROCEDURE — 80053 COMPREHEN METABOLIC PANEL: CPT | Performed by: CLINICAL MEDICAL LABORATORY

## 2024-01-10 PROCEDURE — 10002800 HB RX 250 W HCPCS: Performed by: INTERNAL MEDICINE

## 2024-01-10 PROCEDURE — 36415 COLL VENOUS BLD VENIPUNCTURE: CPT | Performed by: CLINICAL MEDICAL LABORATORY

## 2024-01-10 PROCEDURE — 96372 THER/PROPH/DIAG INJ SC/IM: CPT | Performed by: INTERNAL MEDICINE

## 2024-01-10 RX ADMIN — EPOETIN ALFA-EPBX 20000 UNITS: 40000 INJECTION, SOLUTION INTRAVENOUS; SUBCUTANEOUS at 11:54

## 2024-01-10 ASSESSMENT — PATIENT HEALTH QUESTIONNAIRE - PHQ9
1. LITTLE INTEREST OR PLEASURE IN DOING THINGS: NOT AT ALL
CLINICAL INTERPRETATION OF PHQ2 SCORE: NO FURTHER SCREENING NEEDED
2. FEELING DOWN, DEPRESSED OR HOPELESS: NOT AT ALL
SUM OF ALL RESPONSES TO PHQ9 QUESTIONS 1 AND 2: 0
SUM OF ALL RESPONSES TO PHQ9 QUESTIONS 1 AND 2: 0

## 2024-01-10 ASSESSMENT — PAIN SCALES - GENERAL: PAINLEVEL_OUTOF10: 0

## 2024-02-01 ENCOUNTER — TELEPHONE (OUTPATIENT)
Dept: HEMATOLOGY/ONCOLOGY | Age: 84
End: 2024-02-01

## 2024-02-07 ENCOUNTER — HOSPITAL ENCOUNTER (OUTPATIENT)
Dept: INFUSION THERAPY | Age: 84
Discharge: STILL ADMITTED OR ADMINISTRATIVE | End: 2024-02-07
Attending: INTERNAL MEDICINE

## 2024-02-07 ENCOUNTER — LAB SERVICES (OUTPATIENT)
Dept: LAB | Age: 84
End: 2024-02-07

## 2024-02-07 VITALS
WEIGHT: 148.15 LBS | RESPIRATION RATE: 16 BRPM | OXYGEN SATURATION: 97 % | HEART RATE: 95 BPM | SYSTOLIC BLOOD PRESSURE: 111 MMHG | BODY MASS INDEX: 26.24 KG/M2 | DIASTOLIC BLOOD PRESSURE: 69 MMHG | TEMPERATURE: 97.9 F

## 2024-02-07 DIAGNOSIS — D63.8 ANEMIA, CHRONIC DISEASE: ICD-10-CM

## 2024-02-07 DIAGNOSIS — D63.8 ANEMIA, CHRONIC DISEASE: Primary | ICD-10-CM

## 2024-02-07 LAB
BASOPHILS # BLD: 0 K/MCL (ref 0–0.3)
BASOPHILS NFR BLD: 0 %
DEPRECATED RDW RBC: 42.5 FL (ref 39–50)
EOSINOPHIL # BLD: 0.1 K/MCL (ref 0–0.5)
EOSINOPHIL NFR BLD: 1 %
ERYTHROCYTE [DISTWIDTH] IN BLOOD: 19.4 % (ref 11–15)
HCT VFR BLD CALC: 31.2 % (ref 36–46.5)
HGB BLD-MCNC: 9.8 G/DL (ref 12–15.5)
IMM GRANULOCYTES # BLD AUTO: 0 K/MCL (ref 0–0.2)
IMM GRANULOCYTES # BLD: 0 %
LYMPHOCYTES # BLD: 2.2 K/MCL (ref 1–4)
LYMPHOCYTES NFR BLD: 31 %
MCH RBC QN AUTO: 20.6 PG (ref 26–34)
MCHC RBC AUTO-ENTMCNC: 31.4 G/DL (ref 32–36.5)
MCV RBC AUTO: 65.5 FL (ref 78–100)
MONOCYTES # BLD: 0.7 K/MCL (ref 0.3–0.9)
MONOCYTES NFR BLD: 10 %
NEUTROPHILS # BLD: 4.1 K/MCL (ref 1.8–7.7)
NEUTROPHILS NFR BLD: 58 %
NRBC BLD MANUAL-RTO: 4 /100 WBC
PLATELET # BLD AUTO: 295 K/MCL (ref 140–450)
RBC # BLD: 4.76 MIL/MCL (ref 4–5.2)
WBC # BLD: 7.1 K/MCL (ref 4.2–11)

## 2024-02-07 PROCEDURE — 36415 COLL VENOUS BLD VENIPUNCTURE: CPT | Performed by: CLINICAL MEDICAL LABORATORY

## 2024-02-07 PROCEDURE — 96372 THER/PROPH/DIAG INJ SC/IM: CPT | Performed by: INTERNAL MEDICINE

## 2024-02-07 PROCEDURE — 10002800 HB RX 250 W HCPCS: Performed by: INTERNAL MEDICINE

## 2024-02-07 PROCEDURE — 85025 COMPLETE CBC W/AUTO DIFF WBC: CPT | Performed by: CLINICAL MEDICAL LABORATORY

## 2024-02-07 RX ADMIN — EPOETIN ALFA-EPBX 20000 UNITS: 40000 INJECTION, SOLUTION INTRAVENOUS; SUBCUTANEOUS at 13:17

## 2024-02-07 ASSESSMENT — PATIENT HEALTH QUESTIONNAIRE - PHQ9
SUM OF ALL RESPONSES TO PHQ9 QUESTIONS 1 AND 2: 0
CLINICAL INTERPRETATION OF PHQ2 SCORE: NO FURTHER SCREENING NEEDED
IS PATIENT ABLE TO COMPLETE PHQ2 OR PHQ9: YES

## 2024-02-07 ASSESSMENT — PAIN SCALES - GENERAL: PAINLEVEL_OUTOF10: 0

## 2024-03-06 ENCOUNTER — APPOINTMENT (OUTPATIENT)
Dept: LAB | Age: 84
End: 2024-03-06

## 2024-03-06 ENCOUNTER — HOSPITAL ENCOUNTER (OUTPATIENT)
Dept: INFUSION THERAPY | Age: 84
Discharge: STILL ADMITTED OR ADMINISTRATIVE | End: 2024-03-06
Attending: INTERNAL MEDICINE

## 2024-03-06 ENCOUNTER — LAB SERVICES (OUTPATIENT)
Dept: LAB | Age: 84
End: 2024-03-06

## 2024-03-06 VITALS
HEART RATE: 97 BPM | OXYGEN SATURATION: 99 % | TEMPERATURE: 97.9 F | WEIGHT: 149.47 LBS | SYSTOLIC BLOOD PRESSURE: 111 MMHG | DIASTOLIC BLOOD PRESSURE: 57 MMHG | BODY MASS INDEX: 26.48 KG/M2

## 2024-03-06 DIAGNOSIS — D63.8 ANEMIA, CHRONIC DISEASE: Primary | ICD-10-CM

## 2024-03-06 DIAGNOSIS — D63.8 ANEMIA, CHRONIC DISEASE: ICD-10-CM

## 2024-03-06 LAB
BASOPHILS # BLD: 0.1 K/MCL (ref 0–0.3)
BASOPHILS NFR BLD: 1 %
DEPRECATED RDW RBC: 41.4 FL (ref 39–50)
EOSINOPHIL # BLD: 0.1 K/MCL (ref 0–0.5)
EOSINOPHIL NFR BLD: 2 %
ERYTHROCYTE [DISTWIDTH] IN BLOOD: 19 % (ref 11–15)
HCT VFR BLD CALC: 30.3 % (ref 36–46.5)
HGB BLD-MCNC: 9.5 G/DL (ref 12–15.5)
IMM GRANULOCYTES # BLD AUTO: 0.1 K/MCL (ref 0–0.2)
IMM GRANULOCYTES # BLD: 1 %
LYMPHOCYTES # BLD: 2.5 K/MCL (ref 1–4)
LYMPHOCYTES NFR BLD: 37 %
MCH RBC QN AUTO: 20.5 PG (ref 26–34)
MCHC RBC AUTO-ENTMCNC: 31.4 G/DL (ref 32–36.5)
MCV RBC AUTO: 65.4 FL (ref 78–100)
MONOCYTES # BLD: 0.7 K/MCL (ref 0.3–0.9)
MONOCYTES NFR BLD: 10 %
NEUTROPHILS # BLD: 3.3 K/MCL (ref 1.8–7.7)
NEUTROPHILS NFR BLD: 49 %
NRBC BLD MANUAL-RTO: 4 /100 WBC
PLATELET # BLD AUTO: 299 K/MCL (ref 140–450)
RBC # BLD: 4.63 MIL/MCL (ref 4–5.2)
WBC # BLD: 6.6 K/MCL (ref 4.2–11)

## 2024-03-06 PROCEDURE — 85025 COMPLETE CBC W/AUTO DIFF WBC: CPT | Performed by: CLINICAL MEDICAL LABORATORY

## 2024-03-06 PROCEDURE — 36415 COLL VENOUS BLD VENIPUNCTURE: CPT | Performed by: CLINICAL MEDICAL LABORATORY

## 2024-03-06 PROCEDURE — 96372 THER/PROPH/DIAG INJ SC/IM: CPT | Performed by: INTERNAL MEDICINE

## 2024-03-06 PROCEDURE — 10002800 HB RX 250 W HCPCS: Performed by: INTERNAL MEDICINE

## 2024-03-06 RX ADMIN — EPOETIN ALFA-EPBX 20000 UNITS: 40000 INJECTION, SOLUTION INTRAVENOUS; SUBCUTANEOUS at 13:29

## 2024-03-06 ASSESSMENT — PATIENT HEALTH QUESTIONNAIRE - PHQ9
SUM OF ALL RESPONSES TO PHQ9 QUESTIONS 1 AND 2: 0
IS PATIENT ABLE TO COMPLETE PHQ2 OR PHQ9: YES
CLINICAL INTERPRETATION OF PHQ2 SCORE: NO FURTHER SCREENING NEEDED

## 2024-03-06 ASSESSMENT — PAIN SCALES - GENERAL: PAINLEVEL_OUTOF10: 0

## 2024-03-29 ENCOUNTER — CLINICAL ABSTRACT (OUTPATIENT)
Dept: HEMATOLOGY/ONCOLOGY | Age: 84
End: 2024-03-29

## 2024-03-29 DIAGNOSIS — D63.8 ANEMIA, CHRONIC DISEASE: Primary | ICD-10-CM

## 2024-03-29 RX ORDER — ALBUTEROL SULFATE 2.5 MG/3ML
5 SOLUTION RESPIRATORY (INHALATION)
OUTPATIENT
Start: 2024-06-19

## 2024-03-29 RX ORDER — FAMOTIDINE 10 MG/ML
20 INJECTION, SOLUTION INTRAVENOUS
OUTPATIENT
Start: 2024-06-19

## 2024-03-29 RX ORDER — METHYLPREDNISOLONE SODIUM SUCCINATE 125 MG/2ML
125 INJECTION, POWDER, LYOPHILIZED, FOR SOLUTION INTRAMUSCULAR; INTRAVENOUS
OUTPATIENT
Start: 2024-05-22

## 2024-03-29 RX ORDER — FAMOTIDINE 10 MG/ML
20 INJECTION, SOLUTION INTRAVENOUS
OUTPATIENT
Start: 2024-03-29

## 2024-03-29 RX ORDER — METHYLPREDNISOLONE SODIUM SUCCINATE 125 MG/2ML
125 INJECTION, POWDER, LYOPHILIZED, FOR SOLUTION INTRAMUSCULAR; INTRAVENOUS
OUTPATIENT
Start: 2024-04-10

## 2024-03-29 RX ORDER — DIPHENHYDRAMINE HYDROCHLORIDE 50 MG/ML
50 INJECTION INTRAMUSCULAR; INTRAVENOUS
Start: 2024-04-10

## 2024-03-29 RX ORDER — METHYLPREDNISOLONE SODIUM SUCCINATE 125 MG/2ML
125 INJECTION, POWDER, LYOPHILIZED, FOR SOLUTION INTRAMUSCULAR; INTRAVENOUS
OUTPATIENT
Start: 2024-06-19

## 2024-03-29 RX ORDER — ALBUTEROL SULFATE 2.5 MG/3ML
5 SOLUTION RESPIRATORY (INHALATION)
OUTPATIENT
Start: 2024-04-10

## 2024-03-29 RX ORDER — DIPHENHYDRAMINE HYDROCHLORIDE 50 MG/ML
50 INJECTION INTRAMUSCULAR; INTRAVENOUS
Start: 2024-06-19

## 2024-03-29 RX ORDER — ALBUTEROL SULFATE 90 UG/1
2 AEROSOL, METERED RESPIRATORY (INHALATION)
OUTPATIENT
Start: 2024-06-19

## 2024-03-29 RX ORDER — FAMOTIDINE 10 MG/ML
20 INJECTION, SOLUTION INTRAVENOUS
OUTPATIENT
Start: 2024-04-10

## 2024-03-29 RX ORDER — FAMOTIDINE 10 MG/ML
20 INJECTION, SOLUTION INTRAVENOUS
OUTPATIENT
Start: 2024-05-22

## 2024-03-29 RX ORDER — METHYLPREDNISOLONE SODIUM SUCCINATE 125 MG/2ML
125 INJECTION, POWDER, LYOPHILIZED, FOR SOLUTION INTRAMUSCULAR; INTRAVENOUS
OUTPATIENT
Start: 2024-03-29

## 2024-03-29 RX ORDER — DIPHENHYDRAMINE HYDROCHLORIDE 50 MG/ML
50 INJECTION INTRAMUSCULAR; INTRAVENOUS
Start: 2024-03-29

## 2024-03-29 RX ORDER — ALBUTEROL SULFATE 90 UG/1
2 AEROSOL, METERED RESPIRATORY (INHALATION)
OUTPATIENT
Start: 2024-04-10

## 2024-03-29 RX ORDER — ALBUTEROL SULFATE 90 UG/1
2 AEROSOL, METERED RESPIRATORY (INHALATION)
OUTPATIENT
Start: 2024-05-22

## 2024-03-29 RX ORDER — ALBUTEROL SULFATE 2.5 MG/3ML
5 SOLUTION RESPIRATORY (INHALATION)
OUTPATIENT
Start: 2024-03-29

## 2024-03-29 RX ORDER — ALBUTEROL SULFATE 90 UG/1
2 AEROSOL, METERED RESPIRATORY (INHALATION)
OUTPATIENT
Start: 2024-03-29

## 2024-03-29 RX ORDER — DIPHENHYDRAMINE HYDROCHLORIDE 50 MG/ML
50 INJECTION INTRAMUSCULAR; INTRAVENOUS
Start: 2024-05-22

## 2024-03-29 RX ORDER — ALBUTEROL SULFATE 2.5 MG/3ML
5 SOLUTION RESPIRATORY (INHALATION)
OUTPATIENT
Start: 2024-05-22

## 2024-04-02 ENCOUNTER — TELEPHONE (OUTPATIENT)
Dept: INFUSION THERAPY | Age: 84
End: 2024-04-02

## 2024-04-03 ENCOUNTER — HOSPITAL ENCOUNTER (OUTPATIENT)
Dept: INFUSION THERAPY | Age: 84
Discharge: STILL ADMITTED OR ADMINISTRATIVE | End: 2024-04-03
Attending: INTERNAL MEDICINE

## 2024-04-03 ENCOUNTER — APPOINTMENT (OUTPATIENT)
Dept: LAB | Age: 84
End: 2024-04-03

## 2024-04-03 VITALS
DIASTOLIC BLOOD PRESSURE: 62 MMHG | SYSTOLIC BLOOD PRESSURE: 100 MMHG | TEMPERATURE: 98.1 F | OXYGEN SATURATION: 99 % | WEIGHT: 147.71 LBS | BODY MASS INDEX: 26.17 KG/M2 | HEART RATE: 85 BPM | RESPIRATION RATE: 16 BRPM

## 2024-04-03 DIAGNOSIS — D63.8 ANEMIA, CHRONIC DISEASE: ICD-10-CM

## 2024-04-03 DIAGNOSIS — D63.8 ANEMIA, CHRONIC DISEASE: Primary | ICD-10-CM

## 2024-04-03 LAB
BASOPHILS # BLD: 0 K/MCL (ref 0–0.3)
BASOPHILS NFR BLD: 1 %
DEPRECATED RDW RBC: 41.2 FL (ref 39–50)
EOSINOPHIL # BLD: 0.1 K/MCL (ref 0–0.5)
EOSINOPHIL NFR BLD: 1 %
ERYTHROCYTE [DISTWIDTH] IN BLOOD: 19 % (ref 11–15)
HCT VFR BLD CALC: 30.1 % (ref 36–46.5)
HGB BLD-MCNC: 9.5 G/DL (ref 12–15.5)
IMM GRANULOCYTES # BLD AUTO: 0.1 K/MCL (ref 0–0.2)
IMM GRANULOCYTES # BLD: 1 %
LYMPHOCYTES # BLD: 2 K/MCL (ref 1–4)
LYMPHOCYTES NFR BLD: 30 %
MCH RBC QN AUTO: 20.6 PG (ref 26–34)
MCHC RBC AUTO-ENTMCNC: 31.6 G/DL (ref 32–36.5)
MCV RBC AUTO: 65.2 FL (ref 78–100)
MONOCYTES # BLD: 0.6 K/MCL (ref 0.3–0.9)
MONOCYTES NFR BLD: 8 %
NEUTROPHILS # BLD: 4.1 K/MCL (ref 1.8–7.7)
NEUTROPHILS NFR BLD: 59 %
NRBC BLD MANUAL-RTO: 3 /100 WBC
PLATELET # BLD AUTO: 282 K/MCL (ref 140–450)
RBC # BLD: 4.62 MIL/MCL (ref 4–5.2)
WBC # BLD: 6.9 K/MCL (ref 4.2–11)

## 2024-04-03 PROCEDURE — 96372 THER/PROPH/DIAG INJ SC/IM: CPT | Performed by: INTERNAL MEDICINE

## 2024-04-03 PROCEDURE — 36415 COLL VENOUS BLD VENIPUNCTURE: CPT | Performed by: CLINICAL MEDICAL LABORATORY

## 2024-04-03 PROCEDURE — 10002800 HB RX 250 W HCPCS: Performed by: INTERNAL MEDICINE

## 2024-04-03 PROCEDURE — 85025 COMPLETE CBC W/AUTO DIFF WBC: CPT | Performed by: CLINICAL MEDICAL LABORATORY

## 2024-04-03 RX ADMIN — EPOETIN ALFA-EPBX 20000 UNITS: 40000 INJECTION, SOLUTION INTRAVENOUS; SUBCUTANEOUS at 13:25

## 2024-04-03 ASSESSMENT — PATIENT HEALTH QUESTIONNAIRE - PHQ9
CLINICAL INTERPRETATION OF PHQ2 SCORE: NO FURTHER SCREENING NEEDED
2. FEELING DOWN, DEPRESSED OR HOPELESS: NOT AT ALL
SUM OF ALL RESPONSES TO PHQ9 QUESTIONS 1 AND 2: 0
1. LITTLE INTEREST OR PLEASURE IN DOING THINGS: NOT AT ALL
SUM OF ALL RESPONSES TO PHQ9 QUESTIONS 1 AND 2: 0

## 2024-04-03 ASSESSMENT — PAIN SCALES - GENERAL: PAINLEVEL_OUTOF10: 0

## 2024-04-25 DIAGNOSIS — Z85.43 HISTORY OF OVARIAN CANCER: Primary | ICD-10-CM

## 2024-05-01 ENCOUNTER — HOSPITAL ENCOUNTER (OUTPATIENT)
Dept: INFUSION THERAPY | Age: 84
Discharge: STILL ADMITTED OR ADMINISTRATIVE | End: 2024-05-01
Attending: INTERNAL MEDICINE

## 2024-05-01 ENCOUNTER — OFFICE VISIT (OUTPATIENT)
Dept: HEMATOLOGY/ONCOLOGY | Age: 84
End: 2024-05-01
Attending: INTERNAL MEDICINE

## 2024-05-01 ENCOUNTER — APPOINTMENT (OUTPATIENT)
Dept: LAB | Age: 84
End: 2024-05-01

## 2024-05-01 VITALS
BODY MASS INDEX: 26.25 KG/M2 | HEIGHT: 63 IN | DIASTOLIC BLOOD PRESSURE: 78 MMHG | OXYGEN SATURATION: 100 % | RESPIRATION RATE: 16 BRPM | TEMPERATURE: 97.7 F | SYSTOLIC BLOOD PRESSURE: 130 MMHG | WEIGHT: 148.15 LBS | HEART RATE: 78 BPM

## 2024-05-01 VITALS — WEIGHT: 148.15 LBS | BODY MASS INDEX: 26.67 KG/M2

## 2024-05-01 DIAGNOSIS — D63.8 ANEMIA, CHRONIC DISEASE: Primary | ICD-10-CM

## 2024-05-01 DIAGNOSIS — Z85.43 HISTORY OF OVARIAN CANCER: ICD-10-CM

## 2024-05-01 LAB
ALBUMIN SERPL-MCNC: 3.8 G/DL (ref 3.6–5.1)
ALBUMIN/GLOB SERPL: 1.2 {RATIO} (ref 1–2.4)
ALP SERPL-CCNC: 53 UNITS/L (ref 45–117)
ALT SERPL-CCNC: 12 UNITS/L
ANION GAP SERPL CALC-SCNC: 6 MMOL/L (ref 7–19)
AST SERPL-CCNC: 20 UNITS/L
BASOPHILS # BLD: 0 K/MCL (ref 0–0.3)
BASOPHILS NFR BLD: 1 %
BILIRUB SERPL-MCNC: 0.7 MG/DL (ref 0.2–1)
BUN SERPL-MCNC: 19 MG/DL (ref 6–20)
BUN/CREAT SERPL: 21 (ref 7–25)
CALCIUM SERPL-MCNC: 10 MG/DL (ref 8.4–10.2)
CANCER AG125 SERPL-ACNC: 7 UNITS/ML (ref 0–35)
CHLORIDE SERPL-SCNC: 109 MMOL/L (ref 97–110)
CO2 SERPL-SCNC: 28 MMOL/L (ref 21–32)
CREAT SERPL-MCNC: 0.89 MG/DL (ref 0.51–0.95)
DEPRECATED RDW RBC: 39.8 FL (ref 39–50)
EGFRCR SERPLBLD CKD-EPI 2021: 64 ML/MIN/{1.73_M2}
EOSINOPHIL # BLD: 0.1 K/MCL (ref 0–0.5)
EOSINOPHIL NFR BLD: 2 %
ERYTHROCYTE [DISTWIDTH] IN BLOOD: 18.5 % (ref 11–15)
FASTING DURATION TIME PATIENT: ABNORMAL H
GLOBULIN SER-MCNC: 3.2 G/DL (ref 2–4)
GLUCOSE SERPL-MCNC: 92 MG/DL (ref 70–99)
HCT VFR BLD CALC: 28.3 % (ref 36–46.5)
HGB BLD-MCNC: 9.1 G/DL (ref 12–15.5)
IMM GRANULOCYTES # BLD AUTO: 0 K/MCL (ref 0–0.2)
IMM GRANULOCYTES # BLD: 1 %
LYMPHOCYTES # BLD: 1.9 K/MCL (ref 1–4)
LYMPHOCYTES NFR BLD: 30 %
MCH RBC QN AUTO: 20.6 PG (ref 26–34)
MCHC RBC AUTO-ENTMCNC: 32.2 G/DL (ref 32–36.5)
MCV RBC AUTO: 64 FL (ref 78–100)
MONOCYTES # BLD: 0.6 K/MCL (ref 0.3–0.9)
MONOCYTES NFR BLD: 9 %
NEUTROPHILS # BLD: 3.7 K/MCL (ref 1.8–7.7)
NEUTROPHILS NFR BLD: 57 %
NRBC BLD MANUAL-RTO: 4 /100 WBC
PLATELET # BLD AUTO: 267 K/MCL (ref 140–450)
POTASSIUM SERPL-SCNC: 4.6 MMOL/L (ref 3.4–5.1)
PROT SERPL-MCNC: 7 G/DL (ref 6.4–8.2)
RBC # BLD: 4.42 MIL/MCL (ref 4–5.2)
SODIUM SERPL-SCNC: 138 MMOL/L (ref 135–145)
WBC # BLD: 6.4 K/MCL (ref 4.2–11)

## 2024-05-01 PROCEDURE — 96372 THER/PROPH/DIAG INJ SC/IM: CPT | Performed by: INTERNAL MEDICINE

## 2024-05-01 PROCEDURE — 36415 COLL VENOUS BLD VENIPUNCTURE: CPT | Performed by: CLINICAL MEDICAL LABORATORY

## 2024-05-01 PROCEDURE — 10002800 HB RX 250 W HCPCS: Performed by: INTERNAL MEDICINE

## 2024-05-01 PROCEDURE — 86304 IMMUNOASSAY TUMOR CA 125: CPT | Performed by: CLINICAL MEDICAL LABORATORY

## 2024-05-01 PROCEDURE — 99211 OFF/OP EST MAY X REQ PHY/QHP: CPT

## 2024-05-01 PROCEDURE — 85025 COMPLETE CBC W/AUTO DIFF WBC: CPT | Performed by: CLINICAL MEDICAL LABORATORY

## 2024-05-01 PROCEDURE — 80053 COMPREHEN METABOLIC PANEL: CPT | Performed by: CLINICAL MEDICAL LABORATORY

## 2024-05-01 RX ADMIN — EPOETIN ALFA-EPBX 20000 UNITS: 40000 INJECTION, SOLUTION INTRAVENOUS; SUBCUTANEOUS at 13:23

## 2024-05-01 ASSESSMENT — PATIENT HEALTH QUESTIONNAIRE - PHQ9
2. FEELING DOWN, DEPRESSED OR HOPELESS: NOT AT ALL
1. LITTLE INTEREST OR PLEASURE IN DOING THINGS: NOT AT ALL
CLINICAL INTERPRETATION OF PHQ2 SCORE: NO FURTHER SCREENING NEEDED
SUM OF ALL RESPONSES TO PHQ9 QUESTIONS 1 AND 2: 0
SUM OF ALL RESPONSES TO PHQ9 QUESTIONS 1 AND 2: 0

## 2024-05-01 ASSESSMENT — PAIN SCALES - GENERAL: PAINLEVEL: 0

## 2024-05-01 ASSESSMENT — ENCOUNTER SYMPTOMS
EYES NEGATIVE: 1
ALLERGIC/IMMUNOLOGIC NEGATIVE: 1
PSYCHIATRIC NEGATIVE: 1
CONSTITUTIONAL NEGATIVE: 1
GASTROINTESTINAL NEGATIVE: 1
ENDOCRINE NEGATIVE: 1
HEMATOLOGIC/LYMPHATIC NEGATIVE: 1
NEUROLOGICAL NEGATIVE: 1
SHORTNESS OF BREATH: 1

## 2024-05-23 ENCOUNTER — TELEPHONE (OUTPATIENT)
Dept: HEMATOLOGY/ONCOLOGY | Age: 84
End: 2024-05-23

## 2024-05-28 DIAGNOSIS — D63.8 ANEMIA, CHRONIC DISEASE: Primary | ICD-10-CM

## 2024-05-29 ENCOUNTER — HOSPITAL ENCOUNTER (OUTPATIENT)
Dept: INFUSION THERAPY | Age: 84
Discharge: STILL ADMITTED OR ADMINISTRATIVE | End: 2024-05-29
Attending: INTERNAL MEDICINE

## 2024-05-29 ENCOUNTER — APPOINTMENT (OUTPATIENT)
Dept: LAB | Age: 84
End: 2024-05-29

## 2024-05-29 ENCOUNTER — LAB SERVICES (OUTPATIENT)
Dept: LAB | Age: 84
End: 2024-05-29

## 2024-05-29 VITALS
SYSTOLIC BLOOD PRESSURE: 118 MMHG | TEMPERATURE: 97.9 F | OXYGEN SATURATION: 100 % | DIASTOLIC BLOOD PRESSURE: 72 MMHG | WEIGHT: 148.15 LBS | HEART RATE: 91 BPM | BODY MASS INDEX: 26.67 KG/M2

## 2024-05-29 DIAGNOSIS — D63.8 ANEMIA, CHRONIC DISEASE: Primary | ICD-10-CM

## 2024-05-29 DIAGNOSIS — D63.8 ANEMIA, CHRONIC DISEASE: ICD-10-CM

## 2024-05-29 LAB
BASOPHILS # BLD: 0.1 K/MCL (ref 0–0.3)
BASOPHILS NFR BLD: 1 %
DEPRECATED RDW RBC: 43.2 FL (ref 39–50)
EOSINOPHIL # BLD: 0.1 K/MCL (ref 0–0.5)
EOSINOPHIL NFR BLD: 2 %
ERYTHROCYTE [DISTWIDTH] IN BLOOD: 19.5 % (ref 11–15)
HCT VFR BLD CALC: 28.5 % (ref 36–46.5)
HGB BLD-MCNC: 8.8 G/DL (ref 12–15.5)
IMM GRANULOCYTES # BLD AUTO: 0 K/MCL (ref 0–0.2)
IMM GRANULOCYTES # BLD: 1 %
LYMPHOCYTES # BLD: 2.1 K/MCL (ref 1–4)
LYMPHOCYTES NFR BLD: 31 %
MCH RBC QN AUTO: 20.5 PG (ref 26–34)
MCHC RBC AUTO-ENTMCNC: 30.9 G/DL (ref 32–36.5)
MCV RBC AUTO: 66.4 FL (ref 78–100)
MONOCYTES # BLD: 0.7 K/MCL (ref 0.3–0.9)
MONOCYTES NFR BLD: 11 %
NEUTROPHILS # BLD: 3.6 K/MCL (ref 1.8–7.7)
NEUTROPHILS NFR BLD: 54 %
NRBC BLD MANUAL-RTO: 4 /100 WBC
PLATELET # BLD AUTO: 259 K/MCL (ref 140–450)
RBC # BLD: 4.29 MIL/MCL (ref 4–5.2)
WBC # BLD: 6.6 K/MCL (ref 4.2–11)

## 2024-05-29 PROCEDURE — 96372 THER/PROPH/DIAG INJ SC/IM: CPT | Performed by: INTERNAL MEDICINE

## 2024-05-29 PROCEDURE — 85025 COMPLETE CBC W/AUTO DIFF WBC: CPT | Performed by: CLINICAL MEDICAL LABORATORY

## 2024-05-29 PROCEDURE — 10002800 HB RX 250 W HCPCS: Performed by: INTERNAL MEDICINE

## 2024-05-29 PROCEDURE — 36415 COLL VENOUS BLD VENIPUNCTURE: CPT | Performed by: CLINICAL MEDICAL LABORATORY

## 2024-05-29 RX ADMIN — EPOETIN ALFA-EPBX 20000 UNITS: 40000 INJECTION, SOLUTION INTRAVENOUS; SUBCUTANEOUS at 13:22

## 2024-05-29 ASSESSMENT — PATIENT HEALTH QUESTIONNAIRE - PHQ9
1. LITTLE INTEREST OR PLEASURE IN DOING THINGS: NOT AT ALL
SUM OF ALL RESPONSES TO PHQ9 QUESTIONS 1 AND 2: 0
SUM OF ALL RESPONSES TO PHQ9 QUESTIONS 1 AND 2: 0
2. FEELING DOWN, DEPRESSED OR HOPELESS: NOT AT ALL
CLINICAL INTERPRETATION OF PHQ2 SCORE: NO FURTHER SCREENING NEEDED

## 2024-05-29 ASSESSMENT — PAIN SCALES - GENERAL: PAINLEVEL_OUTOF10: 0

## 2024-06-10 ENCOUNTER — APPOINTMENT (OUTPATIENT)
Dept: CT IMAGING | Age: 84
End: 2024-06-10

## 2024-06-10 ENCOUNTER — HOSPITAL ENCOUNTER (EMERGENCY)
Age: 84
Discharge: LEFT BEFORE DISCHARGE | End: 2024-06-10

## 2024-06-10 VITALS
DIASTOLIC BLOOD PRESSURE: 81 MMHG | OXYGEN SATURATION: 99 % | BODY MASS INDEX: 25.76 KG/M2 | SYSTOLIC BLOOD PRESSURE: 148 MMHG | WEIGHT: 140 LBS | RESPIRATION RATE: 18 BRPM | TEMPERATURE: 97.7 F | HEIGHT: 62 IN | HEART RATE: 98 BPM

## 2024-06-10 LAB
ALBUMIN SERPL-MCNC: 4 G/DL (ref 3.6–5.1)
ALBUMIN/GLOB SERPL: 1 {RATIO} (ref 1–2.4)
ALP SERPL-CCNC: 67 UNITS/L (ref 45–117)
ALT SERPL-CCNC: 12 UNITS/L
ANION GAP SERPL CALC-SCNC: 10 MMOL/L (ref 7–19)
AST SERPL-CCNC: 52 UNITS/L
BASOPHILS # BLD: 0 K/MCL (ref 0–0.3)
BASOPHILS NFR BLD: 1 %
BILIRUB SERPL-MCNC: 0.9 MG/DL (ref 0.2–1)
BUN SERPL-MCNC: 23 MG/DL (ref 6–20)
BUN/CREAT SERPL: 27 (ref 7–25)
CALCIUM SERPL-MCNC: 9.9 MG/DL (ref 8.4–10.2)
CHLORIDE SERPL-SCNC: 110 MMOL/L (ref 97–110)
CO2 SERPL-SCNC: 25 MMOL/L (ref 21–32)
CREAT SERPL-MCNC: 0.85 MG/DL (ref 0.51–0.95)
DEPRECATED RDW RBC: 44.6 FL (ref 39–50)
EGFRCR SERPLBLD CKD-EPI 2021: 68 ML/MIN/{1.73_M2}
EOSINOPHIL # BLD: 0.1 K/MCL (ref 0–0.5)
EOSINOPHIL NFR BLD: 1 %
ERYTHROCYTE [DISTWIDTH] IN BLOOD: 20.2 % (ref 11–15)
FASTING DURATION TIME PATIENT: ABNORMAL H
GLOBULIN SER-MCNC: 4.1 G/DL (ref 2–4)
GLUCOSE SERPL-MCNC: 116 MG/DL (ref 70–99)
HCT VFR BLD CALC: 32.5 % (ref 36–46.5)
HGB BLD-MCNC: 10.2 G/DL (ref 12–15.5)
IMM GRANULOCYTES # BLD AUTO: 0.1 K/MCL (ref 0–0.2)
IMM GRANULOCYTES # BLD: 1 %
LIPASE SERPL-CCNC: 34 UNITS/L (ref 15–77)
LYMPHOCYTES # BLD: 1.5 K/MCL (ref 1–4)
LYMPHOCYTES NFR BLD: 17 %
MCH RBC QN AUTO: 20.9 PG (ref 26–34)
MCHC RBC AUTO-ENTMCNC: 31.4 G/DL (ref 32–36.5)
MCV RBC AUTO: 66.6 FL (ref 78–100)
MONOCYTES # BLD: 0.8 K/MCL (ref 0.3–0.9)
MONOCYTES NFR BLD: 9 %
NEUTROPHILS # BLD: 6.2 K/MCL (ref 1.8–7.7)
NEUTROPHILS NFR BLD: 71 %
NRBC BLD MANUAL-RTO: 4 /100 WBC
PLATELET # BLD AUTO: 294 K/MCL (ref 140–450)
POTASSIUM SERPL-SCNC: 5.5 MMOL/L (ref 3.4–5.1)
PROT SERPL-MCNC: 8.1 G/DL (ref 6.4–8.2)
RBC # BLD: 4.88 MIL/MCL (ref 4–5.2)
SODIUM SERPL-SCNC: 139 MMOL/L (ref 135–145)
WBC # BLD: 8.6 K/MCL (ref 4.2–11)

## 2024-06-10 PROCEDURE — 85025 COMPLETE CBC W/AUTO DIFF WBC: CPT

## 2024-06-10 PROCEDURE — 74177 CT ABD & PELVIS W/CONTRAST: CPT

## 2024-06-10 PROCEDURE — 99281 EMR DPT VST MAYX REQ PHY/QHP: CPT

## 2024-06-10 PROCEDURE — 83690 ASSAY OF LIPASE: CPT

## 2024-06-10 PROCEDURE — 10002805 HB CONTRAST AGENT

## 2024-06-10 PROCEDURE — 36415 COLL VENOUS BLD VENIPUNCTURE: CPT

## 2024-06-10 PROCEDURE — 80053 COMPREHEN METABOLIC PANEL: CPT

## 2024-06-10 RX ADMIN — IOHEXOL 85 ML: 350 INJECTION, SOLUTION INTRAVENOUS at 21:36

## 2024-06-11 ENCOUNTER — CASE MANAGEMENT (OUTPATIENT)
Dept: CARE COORDINATION | Age: 84
End: 2024-06-11

## 2024-06-11 ENCOUNTER — E-ADVICE (OUTPATIENT)
Dept: CARE COORDINATION | Age: 84
End: 2024-06-11

## 2024-06-11 LAB
RAINBOW EXTRA TUBES HOLD SPECIMEN: NORMAL

## 2024-06-23 ENCOUNTER — CASE MANAGEMENT (OUTPATIENT)
Dept: CARE COORDINATION | Age: 84
End: 2024-06-23

## 2024-06-26 ENCOUNTER — HOSPITAL ENCOUNTER (OUTPATIENT)
Dept: INFUSION THERAPY | Age: 84
Discharge: STILL ADMITTED OR ADMINISTRATIVE | End: 2024-06-26
Attending: INTERNAL MEDICINE

## 2024-06-26 ENCOUNTER — APPOINTMENT (OUTPATIENT)
Dept: LAB | Age: 84
End: 2024-06-26

## 2024-06-26 VITALS
BODY MASS INDEX: 27.26 KG/M2 | HEART RATE: 76 BPM | WEIGHT: 149.03 LBS | OXYGEN SATURATION: 96 % | TEMPERATURE: 98.2 F | RESPIRATION RATE: 16 BRPM | SYSTOLIC BLOOD PRESSURE: 110 MMHG | DIASTOLIC BLOOD PRESSURE: 69 MMHG

## 2024-06-26 DIAGNOSIS — D63.8 ANEMIA, CHRONIC DISEASE: ICD-10-CM

## 2024-06-26 DIAGNOSIS — D63.8 ANEMIA, CHRONIC DISEASE: Primary | ICD-10-CM

## 2024-06-26 LAB
BASOPHILS # BLD: 0 K/MCL (ref 0–0.3)
BASOPHILS NFR BLD: 1 %
DEPRECATED RDW RBC: 40.8 FL (ref 39–50)
EOSINOPHIL # BLD: 0.1 K/MCL (ref 0–0.5)
EOSINOPHIL NFR BLD: 2 %
ERYTHROCYTE [DISTWIDTH] IN BLOOD: 19.1 % (ref 11–15)
HCT VFR BLD CALC: 29.8 % (ref 36–46.5)
HGB BLD-MCNC: 9.3 G/DL (ref 12–15.5)
IMM GRANULOCYTES # BLD AUTO: 0 K/MCL (ref 0–0.2)
IMM GRANULOCYTES # BLD: 1 %
LYMPHOCYTES # BLD: 2.1 K/MCL (ref 1–4)
LYMPHOCYTES NFR BLD: 34 %
MCH RBC QN AUTO: 20.2 PG (ref 26–34)
MCHC RBC AUTO-ENTMCNC: 31.2 G/DL (ref 32–36.5)
MCV RBC AUTO: 64.8 FL (ref 78–100)
MONOCYTES # BLD: 0.7 K/MCL (ref 0.3–0.9)
MONOCYTES NFR BLD: 11 %
NEUTROPHILS # BLD: 3.1 K/MCL (ref 1.8–7.7)
NEUTROPHILS NFR BLD: 51 %
NRBC BLD MANUAL-RTO: 4 /100 WBC
PLATELET # BLD AUTO: 284 K/MCL (ref 140–450)
RBC # BLD: 4.6 MIL/MCL (ref 4–5.2)
WBC # BLD: 6 K/MCL (ref 4.2–11)

## 2024-06-26 PROCEDURE — 85025 COMPLETE CBC W/AUTO DIFF WBC: CPT | Performed by: CLINICAL MEDICAL LABORATORY

## 2024-06-26 PROCEDURE — 96372 THER/PROPH/DIAG INJ SC/IM: CPT | Performed by: INTERNAL MEDICINE

## 2024-06-26 PROCEDURE — 10002800 HB RX 250 W HCPCS: Performed by: INTERNAL MEDICINE

## 2024-06-26 PROCEDURE — 36415 COLL VENOUS BLD VENIPUNCTURE: CPT | Performed by: CLINICAL MEDICAL LABORATORY

## 2024-06-26 RX ADMIN — EPOETIN ALFA-EPBX 20000 UNITS: 40000 INJECTION, SOLUTION INTRAVENOUS; SUBCUTANEOUS at 13:13

## 2024-06-26 ASSESSMENT — PAIN SCALES - GENERAL: PAINLEVEL_OUTOF10: 0

## 2024-06-26 ASSESSMENT — PATIENT HEALTH QUESTIONNAIRE - PHQ9
SUM OF ALL RESPONSES TO PHQ9 QUESTIONS 1 AND 2: 0
CLINICAL INTERPRETATION OF PHQ2 SCORE: NO FURTHER SCREENING NEEDED

## 2024-07-24 ENCOUNTER — APPOINTMENT (OUTPATIENT)
Dept: INFUSION THERAPY | Age: 84
End: 2024-07-24
Attending: INTERNAL MEDICINE

## 2024-08-05 ENCOUNTER — CLINICAL ABSTRACT (OUTPATIENT)
Dept: HEMATOLOGY/ONCOLOGY | Age: 84
End: 2024-08-05

## 2024-08-05 DIAGNOSIS — D63.8 ANEMIA, CHRONIC DISEASE: Primary | ICD-10-CM

## 2024-08-05 RX ORDER — ALBUTEROL SULFATE 2.5 MG/3ML
5 SOLUTION RESPIRATORY (INHALATION)
OUTPATIENT
Start: 2024-10-30

## 2024-08-05 RX ORDER — ALBUTEROL SULFATE 90 UG/1
2 AEROSOL, METERED RESPIRATORY (INHALATION)
OUTPATIENT
Start: 2024-10-02

## 2024-08-05 RX ORDER — DIPHENHYDRAMINE HYDROCHLORIDE 50 MG/ML
50 INJECTION INTRAMUSCULAR; INTRAVENOUS
Start: 2024-10-30

## 2024-08-05 RX ORDER — ALBUTEROL SULFATE 90 UG/1
2 AEROSOL, METERED RESPIRATORY (INHALATION)
OUTPATIENT
Start: 2024-09-04

## 2024-08-05 RX ORDER — DIPHENHYDRAMINE HYDROCHLORIDE 50 MG/ML
50 INJECTION INTRAMUSCULAR; INTRAVENOUS
Start: 2024-10-02

## 2024-08-05 RX ORDER — DIPHENHYDRAMINE HYDROCHLORIDE 50 MG/ML
50 INJECTION INTRAMUSCULAR; INTRAVENOUS
Start: 2024-09-04

## 2024-08-05 RX ORDER — ALBUTEROL SULFATE 2.5 MG/3ML
5 SOLUTION RESPIRATORY (INHALATION)
OUTPATIENT
Start: 2024-10-02

## 2024-08-05 RX ORDER — ALBUTEROL SULFATE 90 UG/1
2 AEROSOL, METERED RESPIRATORY (INHALATION)
Status: CANCELLED | OUTPATIENT
Start: 2024-08-07

## 2024-08-05 RX ORDER — ALBUTEROL SULFATE 2.5 MG/3ML
5 SOLUTION RESPIRATORY (INHALATION)
Status: CANCELLED | OUTPATIENT
Start: 2024-08-07

## 2024-08-05 RX ORDER — FAMOTIDINE 10 MG/ML
20 INJECTION, SOLUTION INTRAVENOUS
OUTPATIENT
Start: 2024-09-04

## 2024-08-05 RX ORDER — ALBUTEROL SULFATE 90 UG/1
2 AEROSOL, METERED RESPIRATORY (INHALATION)
OUTPATIENT
Start: 2024-10-30

## 2024-08-05 RX ORDER — ALBUTEROL SULFATE 2.5 MG/3ML
5 SOLUTION RESPIRATORY (INHALATION)
OUTPATIENT
Start: 2024-09-04

## 2024-08-05 RX ORDER — DIPHENHYDRAMINE HYDROCHLORIDE 50 MG/ML
50 INJECTION INTRAMUSCULAR; INTRAVENOUS
Status: CANCELLED
Start: 2024-08-07

## 2024-08-05 RX ORDER — FAMOTIDINE 10 MG/ML
20 INJECTION, SOLUTION INTRAVENOUS
OUTPATIENT
Start: 2024-10-02

## 2024-08-05 RX ORDER — FAMOTIDINE 10 MG/ML
20 INJECTION, SOLUTION INTRAVENOUS
OUTPATIENT
Start: 2024-10-30

## 2024-08-05 RX ORDER — FAMOTIDINE 10 MG/ML
20 INJECTION, SOLUTION INTRAVENOUS
Status: CANCELLED | OUTPATIENT
Start: 2024-08-07

## 2024-08-07 ENCOUNTER — HOSPITAL ENCOUNTER (OUTPATIENT)
Dept: INFUSION THERAPY | Age: 84
Discharge: STILL ADMITTED OR ADMINISTRATIVE | End: 2024-08-07
Attending: INTERNAL MEDICINE

## 2024-08-07 ENCOUNTER — APPOINTMENT (OUTPATIENT)
Dept: LAB | Age: 84
End: 2024-08-07

## 2024-08-07 VITALS
DIASTOLIC BLOOD PRESSURE: 64 MMHG | WEIGHT: 148.59 LBS | TEMPERATURE: 98.1 F | SYSTOLIC BLOOD PRESSURE: 103 MMHG | HEART RATE: 86 BPM | BODY MASS INDEX: 27.18 KG/M2

## 2024-08-07 DIAGNOSIS — D63.8 ANEMIA, CHRONIC DISEASE: Primary | ICD-10-CM

## 2024-08-07 DIAGNOSIS — D63.8 ANEMIA, CHRONIC DISEASE: ICD-10-CM

## 2024-08-07 LAB
ACANTHOCYTES BLD QL SMEAR: NORMAL
BASO STIPL BLD QL SMEAR: PRESENT
BASOPHILS # BLD: 0.1 K/MCL (ref 0–0.3)
BASOPHILS NFR BLD: 1 %
BURR CELLS BLD QL SMEAR: NORMAL
DEPRECATED RDW RBC: 39.9 FL (ref 39–50)
EOSINOPHIL # BLD: 0.1 K/MCL (ref 0–0.5)
EOSINOPHIL NFR BLD: 1 %
ERYTHROCYTE [DISTWIDTH] IN BLOOD: 19.1 % (ref 11–15)
HCT VFR BLD CALC: 27.3 % (ref 36–46.5)
HGB BLD-MCNC: 8.7 G/DL (ref 12–15.5)
HYPOCHROMIA BLD QL SMEAR: NORMAL
LYMPHOCYTES # BLD: 1.9 K/MCL (ref 1–4)
LYMPHOCYTES NFR BLD: 29 %
MCH RBC QN AUTO: 20.2 PG (ref 26–34)
MCHC RBC AUTO-ENTMCNC: 31.9 G/DL (ref 32–36.5)
MCV RBC AUTO: 63.5 FL (ref 78–100)
MICROCYTES BLD QL SMEAR: NORMAL
MONOCYTES # BLD: 0.5 K/MCL (ref 0.3–0.9)
MONOCYTES NFR BLD: 7 %
NEUTROPHILS # BLD: 4 K/MCL (ref 1.8–7.7)
NEUTS SEG NFR BLD: 62 %
NRBC BLD MANUAL-RTO: 4 /100 WBC
OVALOCYTES BLD QL SMEAR: NORMAL
PLAT MORPH BLD: NORMAL
PLATELET # BLD AUTO: 259 K/MCL (ref 140–450)
RBC # BLD: 4.3 MIL/MCL (ref 4–5.2)
SCHISTOCYTES BLD QL SMEAR: NORMAL
TARGETS BLD QL SMEAR: NORMAL
WBC # BLD: 6.5 K/MCL (ref 4.2–11)
WBC MORPH BLD: NORMAL

## 2024-08-07 PROCEDURE — 85027 COMPLETE CBC AUTOMATED: CPT | Performed by: CLINICAL MEDICAL LABORATORY

## 2024-08-07 PROCEDURE — 96372 THER/PROPH/DIAG INJ SC/IM: CPT | Performed by: INTERNAL MEDICINE

## 2024-08-07 PROCEDURE — 36415 COLL VENOUS BLD VENIPUNCTURE: CPT | Performed by: CLINICAL MEDICAL LABORATORY

## 2024-08-07 PROCEDURE — 10002800 HB RX 250 W HCPCS: Performed by: INTERNAL MEDICINE

## 2024-08-07 RX ADMIN — EPOETIN ALFA-EPBX 20000 UNITS: 40000 INJECTION, SOLUTION INTRAVENOUS; SUBCUTANEOUS at 13:35

## 2024-08-07 ASSESSMENT — PAIN SCALES - GENERAL: PAINLEVEL_OUTOF10: 0

## 2024-08-21 ENCOUNTER — APPOINTMENT (OUTPATIENT)
Dept: INFUSION THERAPY | Age: 84
End: 2024-08-21
Attending: INTERNAL MEDICINE

## 2024-09-04 ENCOUNTER — APPOINTMENT (OUTPATIENT)
Dept: LAB | Age: 84
End: 2024-09-04

## 2024-09-04 ENCOUNTER — HOSPITAL ENCOUNTER (OUTPATIENT)
Dept: INFUSION THERAPY | Age: 84
Discharge: STILL ADMITTED OR ADMINISTRATIVE | End: 2024-09-04
Attending: INTERNAL MEDICINE

## 2024-09-04 VITALS
DIASTOLIC BLOOD PRESSURE: 68 MMHG | TEMPERATURE: 98.1 F | WEIGHT: 152.12 LBS | HEART RATE: 84 BPM | BODY MASS INDEX: 27.82 KG/M2 | SYSTOLIC BLOOD PRESSURE: 114 MMHG | OXYGEN SATURATION: 99 %

## 2024-09-04 DIAGNOSIS — D63.8 ANEMIA, CHRONIC DISEASE: ICD-10-CM

## 2024-09-04 DIAGNOSIS — D63.8 ANEMIA, CHRONIC DISEASE: Primary | ICD-10-CM

## 2024-09-04 LAB
BASOPHILS # BLD: 0 K/MCL (ref 0–0.3)
BASOPHILS NFR BLD: 1 %
DEPRECATED RDW RBC: 41 FL (ref 39–50)
EOSINOPHIL # BLD: 0.1 K/MCL (ref 0–0.5)
EOSINOPHIL NFR BLD: 1 %
ERYTHROCYTE [DISTWIDTH] IN BLOOD: 19.1 % (ref 11–15)
HCT VFR BLD CALC: 25.5 % (ref 36–46.5)
HGB BLD-MCNC: 8.3 G/DL (ref 12–15.5)
IMM GRANULOCYTES # BLD AUTO: 0 K/MCL (ref 0–0.2)
IMM GRANULOCYTES # BLD: 1 %
LYMPHOCYTES # BLD: 2.6 K/MCL (ref 1–4)
LYMPHOCYTES NFR BLD: 35 %
MCH RBC QN AUTO: 20.9 PG (ref 26–34)
MCHC RBC AUTO-ENTMCNC: 32.5 G/DL (ref 32–36.5)
MCV RBC AUTO: 64.2 FL (ref 78–100)
MONOCYTES # BLD: 0.7 K/MCL (ref 0.3–0.9)
MONOCYTES NFR BLD: 10 %
NEUTROPHILS # BLD: 3.9 K/MCL (ref 1.8–7.7)
NEUTROPHILS NFR BLD: 52 %
NRBC BLD MANUAL-RTO: 0 /100 WBC
PLATELET # BLD AUTO: 286 K/MCL (ref 140–450)
RBC # BLD: 3.97 MIL/MCL (ref 4–5.2)
WBC # BLD: 7.4 K/MCL (ref 4.2–11)

## 2024-09-04 PROCEDURE — 85025 COMPLETE CBC W/AUTO DIFF WBC: CPT | Performed by: CLINICAL MEDICAL LABORATORY

## 2024-09-04 PROCEDURE — 96372 THER/PROPH/DIAG INJ SC/IM: CPT | Performed by: INTERNAL MEDICINE

## 2024-09-04 PROCEDURE — 10002800 HB RX 250 W HCPCS: Performed by: INTERNAL MEDICINE

## 2024-09-04 PROCEDURE — 36415 COLL VENOUS BLD VENIPUNCTURE: CPT | Performed by: CLINICAL MEDICAL LABORATORY

## 2024-09-04 RX ADMIN — EPOETIN ALFA-EPBX 20000 UNITS: 40000 INJECTION, SOLUTION INTRAVENOUS; SUBCUTANEOUS at 13:41

## 2024-09-04 ASSESSMENT — PAIN SCALES - GENERAL: PAINLEVEL_OUTOF10: 0

## 2024-09-24 ENCOUNTER — LAB REQUISITION (OUTPATIENT)
Dept: LAB | Age: 84
End: 2024-09-24

## 2024-09-24 DIAGNOSIS — Z85.43 PERSONAL HISTORY OF MALIGNANT NEOPLASM OF OVARY: ICD-10-CM

## 2024-09-24 DIAGNOSIS — R53.83 OTHER FATIGUE: ICD-10-CM

## 2024-09-24 DIAGNOSIS — Z00.00 ENCOUNTER FOR GENERAL ADULT MEDICAL EXAMINATION WITHOUT ABNORMAL FINDINGS: ICD-10-CM

## 2024-09-24 DIAGNOSIS — D64.9 ANEMIA, UNSPECIFIED: ICD-10-CM

## 2024-09-24 DIAGNOSIS — M81.8: Primary | ICD-10-CM

## 2024-09-24 DIAGNOSIS — E78.00 PURE HYPERCHOLESTEROLEMIA, UNSPECIFIED: ICD-10-CM

## 2024-09-24 DIAGNOSIS — R79.9 ABNORMAL FINDING OF BLOOD CHEMISTRY, UNSPECIFIED: ICD-10-CM

## 2024-09-24 PROCEDURE — 80061 LIPID PANEL: CPT | Performed by: CLINICAL MEDICAL LABORATORY

## 2024-09-24 PROCEDURE — 85025 COMPLETE CBC W/AUTO DIFF WBC: CPT | Performed by: CLINICAL MEDICAL LABORATORY

## 2024-09-25 LAB
BASOPHILS # BLD: 0 K/MCL (ref 0–0.3)
BASOPHILS NFR BLD: 1 %
CHOLEST SERPL-MCNC: 142 MG/DL
CHOLEST/HDLC SERPL: 1.9 {RATIO}
DEPRECATED RDW RBC: 46.2 FL (ref 39–50)
EOSINOPHIL # BLD: 0.1 K/MCL (ref 0–0.5)
EOSINOPHIL NFR BLD: 1 %
ERYTHROCYTE [DISTWIDTH] IN BLOOD: 20.6 % (ref 11–15)
HCT VFR BLD CALC: 29.8 % (ref 36–46.5)
HDLC SERPL-MCNC: 75 MG/DL
HGB BLD-MCNC: 9 G/DL (ref 12–15.5)
HYPOCHROMIA BLD QL SMEAR: NORMAL
IMM GRANULOCYTES # BLD AUTO: 0 K/MCL (ref 0–0.2)
IMM GRANULOCYTES # BLD: 0 %
LDLC SERPL CALC-MCNC: 49 MG/DL
LYMPHOCYTES # BLD: 1.8 K/MCL (ref 1–4)
LYMPHOCYTES NFR BLD: 33 %
MCH RBC QN AUTO: 20.5 PG (ref 26–34)
MCHC RBC AUTO-ENTMCNC: 30.2 G/DL (ref 32–36.5)
MCV RBC AUTO: 68 FL (ref 78–100)
MICROCYTES BLD QL SMEAR: NORMAL
MONOCYTES # BLD: 0.4 K/MCL (ref 0.3–0.9)
MONOCYTES NFR BLD: 8 %
NEUTROPHILS # BLD: 3.2 K/MCL (ref 1.8–7.7)
NEUTROPHILS NFR BLD: 57 %
NONHDLC SERPL-MCNC: 67 MG/DL
NRBC BLD MANUAL-RTO: 2 /100 WBC
PLATELET # BLD AUTO: 164 K/MCL (ref 140–450)
RBC # BLD: 4.38 MIL/MCL (ref 4–5.2)
SCHISTOCYTES BLD QL SMEAR: NORMAL
TRIGL SERPL-MCNC: 90 MG/DL
WBC # BLD: 5.6 K/MCL (ref 4.2–11)

## 2024-09-27 ENCOUNTER — APPOINTMENT (OUTPATIENT)
Dept: INFUSION THERAPY | Age: 84
End: 2024-09-27
Attending: INTERNAL MEDICINE

## 2024-10-02 ENCOUNTER — APPOINTMENT (OUTPATIENT)
Dept: LAB | Age: 84
End: 2024-10-02

## 2024-10-02 ENCOUNTER — HOSPITAL ENCOUNTER (OUTPATIENT)
Dept: INFUSION THERAPY | Age: 84
Discharge: STILL ADMITTED OR ADMINISTRATIVE | End: 2024-10-02
Attending: INTERNAL MEDICINE

## 2024-10-02 VITALS
SYSTOLIC BLOOD PRESSURE: 111 MMHG | TEMPERATURE: 98.4 F | OXYGEN SATURATION: 98 % | BODY MASS INDEX: 27.5 KG/M2 | WEIGHT: 150.35 LBS | HEART RATE: 86 BPM | DIASTOLIC BLOOD PRESSURE: 69 MMHG

## 2024-10-02 DIAGNOSIS — D63.8 ANEMIA, CHRONIC DISEASE: ICD-10-CM

## 2024-10-02 DIAGNOSIS — D63.8 ANEMIA, CHRONIC DISEASE: Primary | ICD-10-CM

## 2024-10-02 LAB
BASOPHILS # BLD: 0 K/MCL (ref 0–0.3)
BASOPHILS NFR BLD: 0 %
DEPRECATED RDW RBC: 40.6 FL (ref 39–50)
EOSINOPHIL # BLD: 0.1 K/MCL (ref 0–0.5)
EOSINOPHIL NFR BLD: 1 %
ERYTHROCYTE [DISTWIDTH] IN BLOOD: 18.4 % (ref 11–15)
HCT VFR BLD CALC: 28.1 % (ref 36–46.5)
HGB BLD-MCNC: 9 G/DL (ref 12–15.5)
IMM GRANULOCYTES # BLD AUTO: 0 K/MCL (ref 0–0.2)
IMM GRANULOCYTES # BLD: 1 %
LYMPHOCYTES # BLD: 1.7 K/MCL (ref 1–4)
LYMPHOCYTES NFR BLD: 20 %
MCH RBC QN AUTO: 20.9 PG (ref 26–34)
MCHC RBC AUTO-ENTMCNC: 32 G/DL (ref 32–36.5)
MCV RBC AUTO: 65.2 FL (ref 78–100)
MONOCYTES # BLD: 0.8 K/MCL (ref 0.3–0.9)
MONOCYTES NFR BLD: 9 %
NEUTROPHILS # BLD: 5.7 K/MCL (ref 1.8–7.7)
NEUTROPHILS NFR BLD: 69 %
NRBC BLD MANUAL-RTO: 2 /100 WBC
PLATELET # BLD AUTO: 295 K/MCL (ref 140–450)
RBC # BLD: 4.31 MIL/MCL (ref 4–5.2)
WBC # BLD: 8.3 K/MCL (ref 4.2–11)

## 2024-10-02 PROCEDURE — 85025 COMPLETE CBC W/AUTO DIFF WBC: CPT | Performed by: CLINICAL MEDICAL LABORATORY

## 2024-10-02 PROCEDURE — 96372 THER/PROPH/DIAG INJ SC/IM: CPT | Performed by: INTERNAL MEDICINE

## 2024-10-02 PROCEDURE — 10002800 HB RX 250 W HCPCS: Performed by: INTERNAL MEDICINE

## 2024-10-02 PROCEDURE — 36415 COLL VENOUS BLD VENIPUNCTURE: CPT | Performed by: CLINICAL MEDICAL LABORATORY

## 2024-10-02 RX ADMIN — EPOETIN ALFA-EPBX 20000 UNITS: 40000 INJECTION, SOLUTION INTRAVENOUS; SUBCUTANEOUS at 12:55

## 2024-10-02 ASSESSMENT — PAIN SCALES - GENERAL: PAINLEVEL_OUTOF10: 0

## 2024-10-17 ENCOUNTER — APPOINTMENT (OUTPATIENT)
Dept: BONE DENSITY | Age: 84
End: 2024-10-17
Attending: INTERNAL MEDICINE

## 2024-10-17 DIAGNOSIS — M81.8: ICD-10-CM

## 2024-10-17 PROCEDURE — 77080 DXA BONE DENSITY AXIAL: CPT | Performed by: RADIOLOGY

## 2024-10-30 ENCOUNTER — LAB SERVICES (OUTPATIENT)
Dept: LAB | Age: 84
End: 2024-10-30

## 2024-10-30 ENCOUNTER — HOSPITAL ENCOUNTER (OUTPATIENT)
Dept: INFUSION THERAPY | Age: 84
Discharge: STILL ADMITTED OR ADMINISTRATIVE | End: 2024-10-30
Attending: INTERNAL MEDICINE

## 2024-10-30 ENCOUNTER — OFFICE VISIT (OUTPATIENT)
Dept: HEMATOLOGY/ONCOLOGY | Age: 84
End: 2024-10-30
Attending: INTERNAL MEDICINE

## 2024-10-30 VITALS
TEMPERATURE: 97.7 F | BODY MASS INDEX: 27.82 KG/M2 | OXYGEN SATURATION: 98 % | DIASTOLIC BLOOD PRESSURE: 67 MMHG | WEIGHT: 152.12 LBS | SYSTOLIC BLOOD PRESSURE: 109 MMHG | HEART RATE: 92 BPM

## 2024-10-30 VITALS
DIASTOLIC BLOOD PRESSURE: 67 MMHG | HEART RATE: 92 BPM | WEIGHT: 152.12 LBS | OXYGEN SATURATION: 98 % | TEMPERATURE: 97.7 F | BODY MASS INDEX: 27.82 KG/M2 | SYSTOLIC BLOOD PRESSURE: 109 MMHG

## 2024-10-30 DIAGNOSIS — D63.8 ANEMIA, CHRONIC DISEASE: ICD-10-CM

## 2024-10-30 DIAGNOSIS — D63.8 ANEMIA, CHRONIC DISEASE: Primary | ICD-10-CM

## 2024-10-30 DIAGNOSIS — Z85.43 HISTORY OF OVARIAN CANCER: Primary | ICD-10-CM

## 2024-10-30 LAB
BASOPHILS # BLD: 0 K/MCL (ref 0–0.3)
BASOPHILS NFR BLD: 0 %
DEPRECATED RDW RBC: 39.8 FL (ref 39–50)
EOSINOPHIL # BLD: 0.1 K/MCL (ref 0–0.5)
EOSINOPHIL NFR BLD: 1 %
ERYTHROCYTE [DISTWIDTH] IN BLOOD: 18.6 % (ref 11–15)
HCT VFR BLD CALC: 26.9 % (ref 36–46.5)
HGB BLD-MCNC: 8.8 G/DL (ref 12–15.5)
IMM GRANULOCYTES # BLD AUTO: 0.1 K/MCL (ref 0–0.2)
IMM GRANULOCYTES # BLD: 1 %
LYMPHOCYTES # BLD: 2.5 K/MCL (ref 1–4)
LYMPHOCYTES NFR BLD: 38 %
MCH RBC QN AUTO: 21.1 PG (ref 26–34)
MCHC RBC AUTO-ENTMCNC: 32.7 G/DL (ref 32–36.5)
MCV RBC AUTO: 64.5 FL (ref 78–100)
MONOCYTES # BLD: 0.7 K/MCL (ref 0.3–0.9)
MONOCYTES NFR BLD: 11 %
NEUTROPHILS # BLD: 3.1 K/MCL (ref 1.8–7.7)
NEUTROPHILS NFR BLD: 49 %
NRBC BLD MANUAL-RTO: 3 /100 WBC
PLATELET # BLD AUTO: 274 K/MCL (ref 140–450)
RBC # BLD: 4.17 MIL/MCL (ref 4–5.2)
WBC # BLD: 6.5 K/MCL (ref 4.2–11)

## 2024-10-30 PROCEDURE — 85025 COMPLETE CBC W/AUTO DIFF WBC: CPT | Performed by: CLINICAL MEDICAL LABORATORY

## 2024-10-30 PROCEDURE — 36415 COLL VENOUS BLD VENIPUNCTURE: CPT | Performed by: CLINICAL MEDICAL LABORATORY

## 2024-10-30 PROCEDURE — 10002800 HB RX 250 W HCPCS: Performed by: INTERNAL MEDICINE

## 2024-10-30 PROCEDURE — 96372 THER/PROPH/DIAG INJ SC/IM: CPT | Performed by: INTERNAL MEDICINE

## 2024-10-30 RX ADMIN — EPOETIN ALFA-EPBX 20000 UNITS: 40000 INJECTION, SOLUTION INTRAVENOUS; SUBCUTANEOUS at 13:08

## 2024-10-30 ASSESSMENT — ENCOUNTER SYMPTOMS
EYES NEGATIVE: 1
ALLERGIC/IMMUNOLOGIC NEGATIVE: 1
GASTROINTESTINAL NEGATIVE: 1
ENDOCRINE NEGATIVE: 1
PSYCHIATRIC NEGATIVE: 1
HEMATOLOGIC/LYMPHATIC NEGATIVE: 1
NEUROLOGICAL NEGATIVE: 1
CONSTITUTIONAL NEGATIVE: 1
SHORTNESS OF BREATH: 1

## 2024-10-30 ASSESSMENT — PATIENT HEALTH QUESTIONNAIRE - PHQ9
1. LITTLE INTEREST OR PLEASURE IN DOING THINGS: NOT AT ALL
CLINICAL INTERPRETATION OF PHQ2 SCORE: NO FURTHER SCREENING NEEDED
SUM OF ALL RESPONSES TO PHQ9 QUESTIONS 1 AND 2: 0
CLINICAL INTERPRETATION OF PHQ2 SCORE: NO FURTHER SCREENING NEEDED
2. FEELING DOWN, DEPRESSED OR HOPELESS: NOT AT ALL
SUM OF ALL RESPONSES TO PHQ9 QUESTIONS 1 AND 2: 0
SUM OF ALL RESPONSES TO PHQ9 QUESTIONS 1 AND 2: 0

## 2024-10-30 ASSESSMENT — PAIN SCALES - GENERAL: PAINLEVEL_OUTOF10: 0

## 2024-11-06 ENCOUNTER — APPOINTMENT (OUTPATIENT)
Dept: HEMATOLOGY/ONCOLOGY | Age: 84
End: 2024-11-06
Attending: INTERNAL MEDICINE

## 2024-11-22 DIAGNOSIS — D63.8 ANEMIA, CHRONIC DISEASE: Primary | ICD-10-CM

## 2024-11-24 ENCOUNTER — CLINICAL ABSTRACT (OUTPATIENT)
Dept: HEMATOLOGY/ONCOLOGY | Age: 84
End: 2024-11-24

## 2024-11-24 DIAGNOSIS — D63.8 ANEMIA, CHRONIC DISEASE: Primary | ICD-10-CM

## 2024-11-24 RX ORDER — ALBUTEROL SULFATE 90 UG/1
2 INHALANT RESPIRATORY (INHALATION)
OUTPATIENT
Start: 2025-02-19

## 2024-11-24 RX ORDER — DIPHENHYDRAMINE HYDROCHLORIDE 50 MG/ML
50 INJECTION INTRAMUSCULAR; INTRAVENOUS
Status: CANCELLED
Start: 2024-11-27

## 2024-11-24 RX ORDER — DIPHENHYDRAMINE HYDROCHLORIDE 50 MG/ML
50 INJECTION INTRAMUSCULAR; INTRAVENOUS
Start: 2024-12-25

## 2024-11-24 RX ORDER — ALBUTEROL SULFATE 90 UG/1
2 INHALANT RESPIRATORY (INHALATION)
OUTPATIENT
Start: 2025-01-22

## 2024-11-24 RX ORDER — FAMOTIDINE 10 MG/ML
20 INJECTION, SOLUTION INTRAVENOUS
OUTPATIENT
Start: 2025-01-22

## 2024-11-24 RX ORDER — DIPHENHYDRAMINE HYDROCHLORIDE 50 MG/ML
50 INJECTION INTRAMUSCULAR; INTRAVENOUS
Start: 2025-02-19

## 2024-11-24 RX ORDER — ALBUTEROL SULFATE 0.83 MG/ML
5 SOLUTION RESPIRATORY (INHALATION)
OUTPATIENT
Start: 2025-01-22

## 2024-11-24 RX ORDER — ALBUTEROL SULFATE 0.83 MG/ML
5 SOLUTION RESPIRATORY (INHALATION)
Status: CANCELLED | OUTPATIENT
Start: 2024-11-27

## 2024-11-24 RX ORDER — ALBUTEROL SULFATE 90 UG/1
2 INHALANT RESPIRATORY (INHALATION)
Status: CANCELLED | OUTPATIENT
Start: 2024-11-27

## 2024-11-24 RX ORDER — ALBUTEROL SULFATE 90 UG/1
2 INHALANT RESPIRATORY (INHALATION)
OUTPATIENT
Start: 2024-12-25

## 2024-11-24 RX ORDER — FAMOTIDINE 10 MG/ML
20 INJECTION, SOLUTION INTRAVENOUS
OUTPATIENT
Start: 2025-02-19

## 2024-11-24 RX ORDER — DIPHENHYDRAMINE HYDROCHLORIDE 50 MG/ML
50 INJECTION INTRAMUSCULAR; INTRAVENOUS
Start: 2025-01-22

## 2024-11-24 RX ORDER — FAMOTIDINE 10 MG/ML
20 INJECTION, SOLUTION INTRAVENOUS
OUTPATIENT
Start: 2024-12-25

## 2024-11-24 RX ORDER — ALBUTEROL SULFATE 0.83 MG/ML
5 SOLUTION RESPIRATORY (INHALATION)
OUTPATIENT
Start: 2025-02-19

## 2024-11-24 RX ORDER — FAMOTIDINE 10 MG/ML
20 INJECTION, SOLUTION INTRAVENOUS
Status: CANCELLED | OUTPATIENT
Start: 2024-11-27

## 2024-11-24 RX ORDER — ALBUTEROL SULFATE 0.83 MG/ML
5 SOLUTION RESPIRATORY (INHALATION)
OUTPATIENT
Start: 2024-12-25

## 2024-11-27 ENCOUNTER — APPOINTMENT (OUTPATIENT)
Dept: LAB | Age: 84
End: 2024-11-27

## 2024-11-27 ENCOUNTER — HOSPITAL ENCOUNTER (OUTPATIENT)
Dept: INFUSION THERAPY | Age: 84
Discharge: STILL ADMITTED OR ADMINISTRATIVE | End: 2024-11-27
Attending: INTERNAL MEDICINE

## 2024-11-27 VITALS
SYSTOLIC BLOOD PRESSURE: 120 MMHG | TEMPERATURE: 98.1 F | HEART RATE: 89 BPM | RESPIRATION RATE: 16 BRPM | DIASTOLIC BLOOD PRESSURE: 73 MMHG | BODY MASS INDEX: 27.18 KG/M2 | OXYGEN SATURATION: 99 % | WEIGHT: 148.59 LBS

## 2024-11-27 DIAGNOSIS — D63.8 ANEMIA, CHRONIC DISEASE: ICD-10-CM

## 2024-11-27 DIAGNOSIS — D63.8 ANEMIA, CHRONIC DISEASE: Primary | ICD-10-CM

## 2024-11-27 DIAGNOSIS — Z85.43 HISTORY OF OVARIAN CANCER: ICD-10-CM

## 2024-11-27 LAB
BASOPHILS # BLD: 0 K/MCL (ref 0–0.3)
BASOPHILS NFR BLD: 1 %
CANCER AG125 SERPL-ACNC: 7 UNITS/ML (ref 0–35)
DEPRECATED RDW RBC: 40 FL (ref 39–50)
EOSINOPHIL # BLD: 0.1 K/MCL (ref 0–0.5)
EOSINOPHIL NFR BLD: 1 %
ERYTHROCYTE [DISTWIDTH] IN BLOOD: 18.5 % (ref 11–15)
HCT VFR BLD CALC: 27.8 % (ref 36–46.5)
HGB BLD-MCNC: 8.9 G/DL (ref 12–15.5)
IMM GRANULOCYTES # BLD AUTO: 0 K/MCL (ref 0–0.2)
IMM GRANULOCYTES # BLD: 0 %
LYMPHOCYTES # BLD: 2.1 K/MCL (ref 1–4)
LYMPHOCYTES NFR BLD: 36 %
MCH RBC QN AUTO: 20.6 PG (ref 26–34)
MCHC RBC AUTO-ENTMCNC: 32 G/DL (ref 32–36.5)
MCV RBC AUTO: 64.5 FL (ref 78–100)
MONOCYTES # BLD: 0.6 K/MCL (ref 0.3–0.9)
MONOCYTES NFR BLD: 11 %
NEUTROPHILS # BLD: 3 K/MCL (ref 1.8–7.7)
NEUTROPHILS NFR BLD: 51 %
NRBC BLD MANUAL-RTO: 3 /100 WBC
PLATELET # BLD AUTO: 261 K/MCL (ref 140–450)
RBC # BLD: 4.31 MIL/MCL (ref 4–5.2)
WBC # BLD: 5.9 K/MCL (ref 4.2–11)

## 2024-11-27 PROCEDURE — 85025 COMPLETE CBC W/AUTO DIFF WBC: CPT | Performed by: CLINICAL MEDICAL LABORATORY

## 2024-11-27 PROCEDURE — 86304 IMMUNOASSAY TUMOR CA 125: CPT | Performed by: CLINICAL MEDICAL LABORATORY

## 2024-11-27 PROCEDURE — 96372 THER/PROPH/DIAG INJ SC/IM: CPT | Performed by: INTERNAL MEDICINE

## 2024-11-27 PROCEDURE — 10002800 HB RX 250 W HCPCS: Performed by: INTERNAL MEDICINE

## 2024-11-27 PROCEDURE — 36415 COLL VENOUS BLD VENIPUNCTURE: CPT | Performed by: CLINICAL MEDICAL LABORATORY

## 2024-11-27 RX ADMIN — EPOETIN ALFA-EPBX 20000 UNITS: 40000 INJECTION, SOLUTION INTRAVENOUS; SUBCUTANEOUS at 14:32

## 2024-11-27 ASSESSMENT — PAIN SCALES - GENERAL: PAINLEVEL_OUTOF10: 0

## 2024-12-26 ENCOUNTER — APPOINTMENT (OUTPATIENT)
Dept: INFUSION THERAPY | Age: 84
End: 2024-12-26
Attending: INTERNAL MEDICINE

## 2025-01-08 ENCOUNTER — LAB SERVICES (OUTPATIENT)
Dept: LAB | Age: 85
End: 2025-01-08

## 2025-01-08 ENCOUNTER — APPOINTMENT (OUTPATIENT)
Dept: LAB | Age: 85
End: 2025-01-08

## 2025-01-08 ENCOUNTER — HOSPITAL ENCOUNTER (OUTPATIENT)
Dept: INFUSION THERAPY | Age: 85
Discharge: STILL ADMITTED OR ADMINISTRATIVE | End: 2025-01-08
Attending: INTERNAL MEDICINE

## 2025-01-08 VITALS
WEIGHT: 146.61 LBS | SYSTOLIC BLOOD PRESSURE: 108 MMHG | BODY MASS INDEX: 26.81 KG/M2 | TEMPERATURE: 97.9 F | HEART RATE: 93 BPM | OXYGEN SATURATION: 98 % | DIASTOLIC BLOOD PRESSURE: 60 MMHG

## 2025-01-08 DIAGNOSIS — D63.8 ANEMIA, CHRONIC DISEASE: ICD-10-CM

## 2025-01-08 DIAGNOSIS — D63.8 ANEMIA, CHRONIC DISEASE: Primary | ICD-10-CM

## 2025-01-08 LAB
BASOPHILS # BLD: 0 K/MCL (ref 0–0.3)
BASOPHILS NFR BLD: 0 %
DEPRECATED RDW RBC: 40.3 FL (ref 39–50)
EOSINOPHIL # BLD: 0.1 K/MCL (ref 0–0.5)
EOSINOPHIL NFR BLD: 1 %
ERYTHROCYTE [DISTWIDTH] IN BLOOD: 19.1 % (ref 11–15)
HCT VFR BLD CALC: 26.3 % (ref 36–46.5)
HGB BLD-MCNC: 8.7 G/DL (ref 12–15.5)
IMM GRANULOCYTES # BLD AUTO: 0 K/MCL (ref 0–0.2)
IMM GRANULOCYTES # BLD: 0 %
LYMPHOCYTES # BLD: 2.3 K/MCL (ref 1–4)
LYMPHOCYTES NFR BLD: 32 %
MCH RBC QN AUTO: 21 PG (ref 26–34)
MCHC RBC AUTO-ENTMCNC: 33.1 G/DL (ref 32–36.5)
MCV RBC AUTO: 63.5 FL (ref 78–100)
MONOCYTES # BLD: 0.6 K/MCL (ref 0.3–0.9)
MONOCYTES NFR BLD: 8 %
NEUTROPHILS # BLD: 4.2 K/MCL (ref 1.8–7.7)
NEUTROPHILS NFR BLD: 59 %
NRBC BLD MANUAL-RTO: 3 /100 WBC
PLATELET # BLD AUTO: 263 K/MCL (ref 140–450)
RBC # BLD: 4.14 MIL/MCL (ref 4–5.2)
WBC # BLD: 7.2 K/MCL (ref 4.2–11)

## 2025-01-08 PROCEDURE — 96372 THER/PROPH/DIAG INJ SC/IM: CPT | Performed by: INTERNAL MEDICINE

## 2025-01-08 PROCEDURE — 36415 COLL VENOUS BLD VENIPUNCTURE: CPT | Performed by: CLINICAL MEDICAL LABORATORY

## 2025-01-08 PROCEDURE — 85025 COMPLETE CBC W/AUTO DIFF WBC: CPT | Performed by: CLINICAL MEDICAL LABORATORY

## 2025-01-08 PROCEDURE — 10002800 HB RX 250 W HCPCS: Performed by: INTERNAL MEDICINE

## 2025-01-08 RX ADMIN — EPOETIN ALFA-EPBX 20000 UNITS: 40000 INJECTION, SOLUTION INTRAVENOUS; SUBCUTANEOUS at 13:10

## 2025-01-08 ASSESSMENT — PAIN SCALES - GENERAL: PAINLEVEL_OUTOF10: 0

## 2025-01-22 ENCOUNTER — APPOINTMENT (OUTPATIENT)
Dept: INFUSION THERAPY | Age: 85
End: 2025-01-22
Attending: INTERNAL MEDICINE

## 2025-02-03 ENCOUNTER — TELEPHONE (OUTPATIENT)
Dept: CARDIOLOGY | Age: 85
End: 2025-02-03

## 2025-02-05 ENCOUNTER — APPOINTMENT (OUTPATIENT)
Dept: INFUSION THERAPY | Age: 85
End: 2025-02-05
Attending: INTERNAL MEDICINE

## 2025-02-17 ENCOUNTER — APPOINTMENT (OUTPATIENT)
Dept: CARDIOLOGY | Age: 85
End: 2025-02-17

## 2025-02-17 VITALS
HEART RATE: 60 BPM | SYSTOLIC BLOOD PRESSURE: 118 MMHG | BODY MASS INDEX: 24.07 KG/M2 | DIASTOLIC BLOOD PRESSURE: 60 MMHG | WEIGHT: 141 LBS | HEIGHT: 64 IN

## 2025-02-17 DIAGNOSIS — D64.9 CHRONIC ANEMIA: ICD-10-CM

## 2025-02-17 DIAGNOSIS — I48.0 PAROXYSMAL ATRIAL FIBRILLATION  (CMD): Primary | ICD-10-CM

## 2025-02-17 DIAGNOSIS — Z79.01 CHRONIC ANTICOAGULATION: ICD-10-CM

## 2025-02-17 PROCEDURE — 93000 ELECTROCARDIOGRAM COMPLETE: CPT | Performed by: INTERNAL MEDICINE

## 2025-02-17 PROCEDURE — 99204 OFFICE O/P NEW MOD 45 MIN: CPT | Performed by: INTERNAL MEDICINE

## 2025-02-17 RX ORDER — METOPROLOL SUCCINATE 25 MG/1
25 TABLET, EXTENDED RELEASE ORAL EVERY 6 HOURS
COMMUNITY
End: 2025-02-17 | Stop reason: ALTCHOICE

## 2025-02-17 RX ORDER — METOPROLOL SUCCINATE 25 MG/1
25 TABLET, EXTENDED RELEASE ORAL DAILY
Qty: 90 TABLET | Refills: 3 | Status: SHIPPED | OUTPATIENT
Start: 2025-02-17

## 2025-02-17 ASSESSMENT — ENCOUNTER SYMPTOMS
FEVER: 0
BLOOD IN STOOL: 0
VOMITING: 0
HEADACHES: 0
NERVOUS/ANXIOUS: 0
DIZZINESS: 0
NAUSEA: 0
BRUISES/BLEEDS EASILY: 1
FATIGUE: 1
SHORTNESS OF BREATH: 1

## 2025-02-18 ENCOUNTER — TELEPHONE (OUTPATIENT)
Dept: HEMATOLOGY/ONCOLOGY | Age: 85
End: 2025-02-18

## 2025-02-21 ENCOUNTER — APPOINTMENT (OUTPATIENT)
Dept: INFUSION THERAPY | Age: 85
End: 2025-02-21
Attending: INTERNAL MEDICINE

## 2025-02-24 ENCOUNTER — TELEPHONE (OUTPATIENT)
Dept: INFUSION THERAPY | Age: 85
End: 2025-02-24

## 2025-02-24 ENCOUNTER — TELEPHONE (OUTPATIENT)
Dept: CARDIOLOGY | Age: 85
End: 2025-02-24

## 2025-02-26 ENCOUNTER — HOSPITAL ENCOUNTER (OUTPATIENT)
Dept: INFUSION THERAPY | Age: 85
Discharge: STILL ADMITTED OR ADMINISTRATIVE | End: 2025-02-26
Attending: INTERNAL MEDICINE

## 2025-02-26 ENCOUNTER — LAB SERVICES (OUTPATIENT)
Dept: LAB | Age: 85
End: 2025-02-26

## 2025-02-26 VITALS
DIASTOLIC BLOOD PRESSURE: 59 MMHG | SYSTOLIC BLOOD PRESSURE: 105 MMHG | WEIGHT: 143.3 LBS | HEART RATE: 98 BPM | OXYGEN SATURATION: 100 % | RESPIRATION RATE: 18 BRPM | BODY MASS INDEX: 24.6 KG/M2

## 2025-02-26 DIAGNOSIS — D63.8 ANEMIA, CHRONIC DISEASE: ICD-10-CM

## 2025-02-26 DIAGNOSIS — D63.8 ANEMIA, CHRONIC DISEASE: Primary | ICD-10-CM

## 2025-02-26 LAB
BASOPHILS # BLD: 0 K/MCL (ref 0–0.3)
BASOPHILS NFR BLD: 1 %
DEPRECATED RDW RBC: 43.2 FL (ref 39–50)
EOSINOPHIL # BLD: 0.1 K/MCL (ref 0–0.5)
EOSINOPHIL NFR BLD: 2 %
ERYTHROCYTE [DISTWIDTH] IN BLOOD: 19.9 % (ref 11–15)
HCT VFR BLD CALC: 24.3 % (ref 36–46.5)
HGB BLD-MCNC: 7.9 G/DL (ref 12–15.5)
IMM GRANULOCYTES # BLD AUTO: 0.1 K/MCL (ref 0–0.2)
IMM GRANULOCYTES # BLD: 1 %
LYMPHOCYTES # BLD: 2.1 K/MCL (ref 1–4)
LYMPHOCYTES NFR BLD: 29 %
MCH RBC QN AUTO: 21 PG (ref 26–34)
MCHC RBC AUTO-ENTMCNC: 32.5 G/DL (ref 32–36.5)
MCV RBC AUTO: 64.5 FL (ref 78–100)
MONOCYTES # BLD: 0.8 K/MCL (ref 0.3–0.9)
MONOCYTES NFR BLD: 11 %
NEUTROPHILS # BLD: 4.2 K/MCL (ref 1.8–7.7)
NEUTROPHILS NFR BLD: 56 %
NRBC BLD MANUAL-RTO: 5 /100 WBC
PLATELET # BLD AUTO: 225 K/MCL (ref 140–450)
RBC # BLD: 3.77 MIL/MCL (ref 4–5.2)
WBC # BLD: 7.3 K/MCL (ref 4.2–11)

## 2025-02-26 PROCEDURE — 96372 THER/PROPH/DIAG INJ SC/IM: CPT | Performed by: INTERNAL MEDICINE

## 2025-02-26 PROCEDURE — 10002800 HB RX 250 W HCPCS: Performed by: INTERNAL MEDICINE

## 2025-02-26 RX ADMIN — EPOETIN ALFA-EPBX 20000 UNITS: 40000 INJECTION, SOLUTION INTRAVENOUS; SUBCUTANEOUS at 13:58

## 2025-02-26 ASSESSMENT — PATIENT HEALTH QUESTIONNAIRE - PHQ9
1. LITTLE INTEREST OR PLEASURE IN DOING THINGS: NOT AT ALL
SUM OF ALL RESPONSES TO PHQ9 QUESTIONS 1 AND 2: 0
SUM OF ALL RESPONSES TO PHQ9 QUESTIONS 1 AND 2: 0
CLINICAL INTERPRETATION OF PHQ2 SCORE: NO FURTHER SCREENING NEEDED
IS PATIENT ABLE TO COMPLETE PHQ2 OR PHQ9: YES
2. FEELING DOWN, DEPRESSED OR HOPELESS: NOT AT ALL

## 2025-02-26 ASSESSMENT — PAIN SCALES - GENERAL: PAINLEVEL_OUTOF10: 0

## 2025-03-05 ENCOUNTER — APPOINTMENT (OUTPATIENT)
Dept: INFUSION THERAPY | Age: 85
End: 2025-03-05
Attending: INTERNAL MEDICINE

## 2025-03-21 DIAGNOSIS — D63.8 ANEMIA, CHRONIC DISEASE: Primary | ICD-10-CM

## 2025-03-26 ENCOUNTER — HOSPITAL ENCOUNTER (OUTPATIENT)
Dept: INFUSION THERAPY | Age: 85
Discharge: STILL ADMITTED OR ADMINISTRATIVE | End: 2025-03-26
Attending: INTERNAL MEDICINE

## 2025-03-26 ENCOUNTER — LAB SERVICES (OUTPATIENT)
Dept: LAB | Age: 85
End: 2025-03-26

## 2025-03-26 VITALS
BODY MASS INDEX: 24.71 KG/M2 | TEMPERATURE: 97.7 F | OXYGEN SATURATION: 100 % | SYSTOLIC BLOOD PRESSURE: 111 MMHG | HEART RATE: 73 BPM | WEIGHT: 143.96 LBS | DIASTOLIC BLOOD PRESSURE: 69 MMHG

## 2025-03-26 DIAGNOSIS — D63.8 ANEMIA, CHRONIC DISEASE: Primary | ICD-10-CM

## 2025-03-26 DIAGNOSIS — D63.8 ANEMIA, CHRONIC DISEASE: ICD-10-CM

## 2025-03-26 LAB
BASOPHILS # BLD: 0 K/MCL (ref 0–0.3)
BASOPHILS NFR BLD: 1 %
DEPRECATED RDW RBC: 43.5 FL (ref 39–50)
EOSINOPHIL # BLD: 0.1 K/MCL (ref 0–0.5)
EOSINOPHIL NFR BLD: 1 %
ERYTHROCYTE [DISTWIDTH] IN BLOOD: 19.7 % (ref 11–15)
HCT VFR BLD CALC: 26.9 % (ref 36–46.5)
HGB BLD-MCNC: 8.6 G/DL (ref 12–15.5)
IMM GRANULOCYTES # BLD AUTO: 0.1 K/MCL (ref 0–0.2)
IMM GRANULOCYTES # BLD: 1 %
LYMPHOCYTES # BLD: 2.1 K/MCL (ref 1–4)
LYMPHOCYTES NFR BLD: 32 %
MCH RBC QN AUTO: 20.9 PG (ref 26–34)
MCHC RBC AUTO-ENTMCNC: 32 G/DL (ref 32–36.5)
MCV RBC AUTO: 65.3 FL (ref 78–100)
MONOCYTES # BLD: 0.6 K/MCL (ref 0.3–0.9)
MONOCYTES NFR BLD: 10 %
NEUTROPHILS # BLD: 3.6 K/MCL (ref 1.8–7.7)
NEUTROPHILS NFR BLD: 55 %
NRBC BLD MANUAL-RTO: 5 /100 WBC
PLATELET # BLD AUTO: 254 K/MCL (ref 140–450)
RBC # BLD: 4.12 MIL/MCL (ref 4–5.2)
WBC # BLD: 6.4 K/MCL (ref 4.2–11)

## 2025-03-26 PROCEDURE — 96372 THER/PROPH/DIAG INJ SC/IM: CPT | Performed by: INTERNAL MEDICINE

## 2025-03-26 PROCEDURE — 85025 COMPLETE CBC W/AUTO DIFF WBC: CPT | Performed by: CLINICAL MEDICAL LABORATORY

## 2025-03-26 PROCEDURE — 10002800 HB RX 250 W HCPCS: Performed by: INTERNAL MEDICINE

## 2025-03-26 PROCEDURE — 36415 COLL VENOUS BLD VENIPUNCTURE: CPT | Performed by: CLINICAL MEDICAL LABORATORY

## 2025-03-26 RX ADMIN — EPOETIN ALFA-EPBX 20000 UNITS: 40000 INJECTION, SOLUTION INTRAVENOUS; SUBCUTANEOUS at 13:00

## 2025-03-26 ASSESSMENT — PAIN SCALES - GENERAL: PAINLEVEL_OUTOF10: 0

## 2025-03-26 ASSESSMENT — PATIENT HEALTH QUESTIONNAIRE - PHQ9
SUM OF ALL RESPONSES TO PHQ9 QUESTIONS 1 AND 2: 0
1. LITTLE INTEREST OR PLEASURE IN DOING THINGS: NOT AT ALL
CLINICAL INTERPRETATION OF PHQ2 SCORE: NO FURTHER SCREENING NEEDED
SUM OF ALL RESPONSES TO PHQ9 QUESTIONS 1 AND 2: 0
2. FEELING DOWN, DEPRESSED OR HOPELESS: NOT AT ALL

## 2025-04-02 ENCOUNTER — APPOINTMENT (OUTPATIENT)
Dept: INFUSION THERAPY | Age: 85
End: 2025-04-02
Attending: INTERNAL MEDICINE

## 2025-04-17 ENCOUNTER — TELEPHONE (OUTPATIENT)
Dept: HEMATOLOGY/ONCOLOGY | Age: 85
End: 2025-04-17

## 2025-04-17 DIAGNOSIS — Z85.43 HISTORY OF OVARIAN CANCER: Primary | ICD-10-CM

## 2025-04-19 ENCOUNTER — CLINICAL ABSTRACT (OUTPATIENT)
Dept: HEMATOLOGY/ONCOLOGY | Age: 85
End: 2025-04-19

## 2025-04-19 DIAGNOSIS — D63.8 ANEMIA, CHRONIC DISEASE: Primary | ICD-10-CM

## 2025-04-19 RX ORDER — DIPHENHYDRAMINE HYDROCHLORIDE 50 MG/ML
50 INJECTION, SOLUTION INTRAMUSCULAR; INTRAVENOUS
Start: 2025-06-05

## 2025-04-19 RX ORDER — DIPHENHYDRAMINE HYDROCHLORIDE 50 MG/ML
50 INJECTION, SOLUTION INTRAMUSCULAR; INTRAVENOUS
Start: 2025-05-08

## 2025-04-19 RX ORDER — FAMOTIDINE 10 MG/ML
20 INJECTION, SOLUTION INTRAVENOUS
OUTPATIENT
Start: 2025-07-24

## 2025-04-19 RX ORDER — ALBUTEROL SULFATE 90 UG/1
2 INHALANT RESPIRATORY (INHALATION)
OUTPATIENT
Start: 2025-04-19

## 2025-04-19 RX ORDER — ALBUTEROL SULFATE 90 UG/1
2 INHALANT RESPIRATORY (INHALATION)
OUTPATIENT
Start: 2025-07-24

## 2025-04-19 RX ORDER — DIPHENHYDRAMINE HYDROCHLORIDE 50 MG/ML
50 INJECTION, SOLUTION INTRAMUSCULAR; INTRAVENOUS
Start: 2025-04-19

## 2025-04-19 RX ORDER — DIPHENHYDRAMINE HYDROCHLORIDE 50 MG/ML
50 INJECTION, SOLUTION INTRAMUSCULAR; INTRAVENOUS
Start: 2025-07-24

## 2025-04-19 RX ORDER — FAMOTIDINE 10 MG/ML
20 INJECTION, SOLUTION INTRAVENOUS
OUTPATIENT
Start: 2025-06-05

## 2025-04-19 RX ORDER — FAMOTIDINE 10 MG/ML
20 INJECTION, SOLUTION INTRAVENOUS
OUTPATIENT
Start: 2025-04-19

## 2025-04-19 RX ORDER — ALBUTEROL SULFATE 0.83 MG/ML
5 SOLUTION RESPIRATORY (INHALATION)
OUTPATIENT
Start: 2025-05-08

## 2025-04-19 RX ORDER — ALBUTEROL SULFATE 90 UG/1
2 INHALANT RESPIRATORY (INHALATION)
OUTPATIENT
Start: 2025-05-08

## 2025-04-19 RX ORDER — ALBUTEROL SULFATE 0.83 MG/ML
5 SOLUTION RESPIRATORY (INHALATION)
OUTPATIENT
Start: 2025-04-19

## 2025-04-19 RX ORDER — ALBUTEROL SULFATE 90 UG/1
2 INHALANT RESPIRATORY (INHALATION)
OUTPATIENT
Start: 2025-06-05

## 2025-04-19 RX ORDER — ALBUTEROL SULFATE 0.83 MG/ML
5 SOLUTION RESPIRATORY (INHALATION)
OUTPATIENT
Start: 2025-06-05

## 2025-04-19 RX ORDER — FAMOTIDINE 10 MG/ML
20 INJECTION, SOLUTION INTRAVENOUS
OUTPATIENT
Start: 2025-05-08

## 2025-04-19 RX ORDER — ALBUTEROL SULFATE 0.83 MG/ML
5 SOLUTION RESPIRATORY (INHALATION)
OUTPATIENT
Start: 2025-07-24

## 2025-04-23 ENCOUNTER — HOSPITAL ENCOUNTER (OUTPATIENT)
Dept: INFUSION THERAPY | Age: 85
Discharge: STILL ADMITTED OR ADMINISTRATIVE | End: 2025-04-23
Attending: INTERNAL MEDICINE

## 2025-04-23 ENCOUNTER — APPOINTMENT (OUTPATIENT)
Dept: LAB | Age: 85
End: 2025-04-23

## 2025-04-23 VITALS
SYSTOLIC BLOOD PRESSURE: 108 MMHG | BODY MASS INDEX: 24.98 KG/M2 | HEART RATE: 81 BPM | DIASTOLIC BLOOD PRESSURE: 60 MMHG | WEIGHT: 145.5 LBS | TEMPERATURE: 97.5 F | OXYGEN SATURATION: 100 %

## 2025-04-23 DIAGNOSIS — Z85.43 HISTORY OF OVARIAN CANCER: ICD-10-CM

## 2025-04-23 DIAGNOSIS — D63.8 ANEMIA, CHRONIC DISEASE: Primary | ICD-10-CM

## 2025-04-23 DIAGNOSIS — D63.8 ANEMIA, CHRONIC DISEASE: ICD-10-CM

## 2025-04-23 LAB
ALBUMIN SERPL-MCNC: 3.7 G/DL (ref 3.4–5)
ALBUMIN/GLOB SERPL: 1.1 {RATIO} (ref 1–2.4)
ALP SERPL-CCNC: 52 UNITS/L (ref 45–117)
ALT SERPL-CCNC: 11 UNITS/L
ANION GAP SERPL CALC-SCNC: 6 MMOL/L (ref 7–19)
AST SERPL-CCNC: 21 UNITS/L
BASOPHILS # BLD: 0 K/MCL (ref 0–0.3)
BASOPHILS NFR BLD: 1 %
BILIRUB SERPL-MCNC: 0.9 MG/DL (ref 0.2–1)
BUN SERPL-MCNC: 19 MG/DL (ref 6–20)
BUN/CREAT SERPL: 20 (ref 7–25)
CALCIUM SERPL-MCNC: 9.9 MG/DL (ref 8.4–10.2)
CANCER AG125 SERPL-ACNC: 8 UNITS/ML (ref 0–35)
CHLORIDE SERPL-SCNC: 110 MMOL/L (ref 97–110)
CO2 SERPL-SCNC: 28 MMOL/L (ref 21–32)
CREAT SERPL-MCNC: 0.96 MG/DL (ref 0.51–0.95)
DEPRECATED RDW RBC: 41.9 FL (ref 39–50)
EGFRCR SERPLBLD CKD-EPI 2021: 58 ML/MIN/{1.73_M2}
EOSINOPHIL # BLD: 0.1 K/MCL (ref 0–0.5)
EOSINOPHIL NFR BLD: 1 %
ERYTHROCYTE [DISTWIDTH] IN BLOOD: 19.2 % (ref 11–15)
FASTING DURATION TIME PATIENT: ABNORMAL H
GLOBULIN SER-MCNC: 3.3 G/DL (ref 2–4)
GLUCOSE SERPL-MCNC: 94 MG/DL (ref 70–99)
HCT VFR BLD CALC: 27.6 % (ref 36–46.5)
HGB BLD-MCNC: 8.5 G/DL (ref 12–15.5)
IMM GRANULOCYTES # BLD AUTO: 0 K/MCL (ref 0–0.2)
IMM GRANULOCYTES # BLD: 1 %
LYMPHOCYTES # BLD: 1.8 K/MCL (ref 1–4)
LYMPHOCYTES NFR BLD: 32 %
MCH RBC QN AUTO: 20.3 PG (ref 26–34)
MCHC RBC AUTO-ENTMCNC: 30.8 G/DL (ref 32–36.5)
MCV RBC AUTO: 66 FL (ref 78–100)
MONOCYTES # BLD: 0.6 K/MCL (ref 0.3–0.9)
MONOCYTES NFR BLD: 10 %
NEUTROPHILS # BLD: 3.3 K/MCL (ref 1.8–7.7)
NEUTROPHILS NFR BLD: 55 %
NRBC BLD MANUAL-RTO: 4 /100 WBC
PLATELET # BLD AUTO: 249 K/MCL (ref 140–450)
POTASSIUM SERPL-SCNC: 4.7 MMOL/L (ref 3.4–5.1)
PROT SERPL-MCNC: 7 G/DL (ref 6.4–8.2)
RBC # BLD: 4.18 MIL/MCL (ref 4–5.2)
SODIUM SERPL-SCNC: 139 MMOL/L (ref 135–145)
WBC # BLD: 5.8 K/MCL (ref 4.2–11)

## 2025-04-23 PROCEDURE — 10002800 HB RX 250 W HCPCS: Performed by: INTERNAL MEDICINE

## 2025-04-23 PROCEDURE — 96372 THER/PROPH/DIAG INJ SC/IM: CPT | Performed by: INTERNAL MEDICINE

## 2025-04-23 PROCEDURE — 80053 COMPREHEN METABOLIC PANEL: CPT | Performed by: CLINICAL MEDICAL LABORATORY

## 2025-04-23 PROCEDURE — 36415 COLL VENOUS BLD VENIPUNCTURE: CPT | Performed by: CLINICAL MEDICAL LABORATORY

## 2025-04-23 PROCEDURE — 86304 IMMUNOASSAY TUMOR CA 125: CPT | Performed by: CLINICAL MEDICAL LABORATORY

## 2025-04-23 PROCEDURE — 85025 COMPLETE CBC W/AUTO DIFF WBC: CPT | Performed by: CLINICAL MEDICAL LABORATORY

## 2025-04-23 RX ADMIN — EPOETIN ALFA-EPBX 20000 UNITS: 40000 INJECTION, SOLUTION INTRAVENOUS; SUBCUTANEOUS at 14:02

## 2025-04-23 ASSESSMENT — PAIN SCALES - GENERAL: PAINLEVEL_OUTOF10: 0

## 2025-04-30 ENCOUNTER — OFFICE VISIT (OUTPATIENT)
Dept: HEMATOLOGY/ONCOLOGY | Age: 85
End: 2025-04-30
Attending: INTERNAL MEDICINE

## 2025-04-30 VITALS
DIASTOLIC BLOOD PRESSURE: 73 MMHG | BODY MASS INDEX: 26.02 KG/M2 | WEIGHT: 146.83 LBS | OXYGEN SATURATION: 100 % | RESPIRATION RATE: 16 BRPM | HEIGHT: 63 IN | SYSTOLIC BLOOD PRESSURE: 135 MMHG | TEMPERATURE: 97.5 F

## 2025-04-30 DIAGNOSIS — Z85.43 HISTORY OF OVARIAN CANCER: ICD-10-CM

## 2025-04-30 DIAGNOSIS — D63.8 ANEMIA, CHRONIC DISEASE: Primary | ICD-10-CM

## 2025-04-30 PROCEDURE — 99211 OFF/OP EST MAY X REQ PHY/QHP: CPT

## 2025-04-30 ASSESSMENT — ENCOUNTER SYMPTOMS
NEUROLOGICAL NEGATIVE: 1
ENDOCRINE NEGATIVE: 1
EYES NEGATIVE: 1
GASTROINTESTINAL NEGATIVE: 1
HEMATOLOGIC/LYMPHATIC NEGATIVE: 1
PSYCHIATRIC NEGATIVE: 1
CONSTITUTIONAL NEGATIVE: 1
SHORTNESS OF BREATH: 1
ALLERGIC/IMMUNOLOGIC NEGATIVE: 1

## 2025-04-30 ASSESSMENT — PAIN SCALES - GENERAL: PAINLEVEL_OUTOF10: 0

## 2025-04-30 ASSESSMENT — PATIENT HEALTH QUESTIONNAIRE - PHQ9: SUM OF ALL RESPONSES TO PHQ9 QUESTIONS 1 AND 2: 0

## 2025-05-19 ENCOUNTER — APPOINTMENT (OUTPATIENT)
Dept: CARDIOLOGY | Age: 85
End: 2025-05-19

## 2025-05-21 ENCOUNTER — HOSPITAL ENCOUNTER (OUTPATIENT)
Dept: INFUSION THERAPY | Age: 85
Discharge: STILL ADMITTED OR ADMINISTRATIVE | End: 2025-05-21
Attending: INTERNAL MEDICINE

## 2025-05-21 ENCOUNTER — LAB SERVICES (OUTPATIENT)
Dept: LAB | Age: 85
End: 2025-05-21

## 2025-05-21 VITALS
WEIGHT: 147.71 LBS | DIASTOLIC BLOOD PRESSURE: 79 MMHG | TEMPERATURE: 99 F | BODY MASS INDEX: 26.59 KG/M2 | HEART RATE: 81 BPM | OXYGEN SATURATION: 98 % | SYSTOLIC BLOOD PRESSURE: 147 MMHG | RESPIRATION RATE: 18 BRPM

## 2025-05-21 DIAGNOSIS — D63.8 ANEMIA, CHRONIC DISEASE: Primary | ICD-10-CM

## 2025-05-21 DIAGNOSIS — D63.8 ANEMIA, CHRONIC DISEASE: ICD-10-CM

## 2025-05-21 LAB
ACANTHOCYTES BLD QL SMEAR: ABNORMAL
DEPRECATED RDW RBC: 41.4 FL (ref 39–50)
EOSINOPHIL # BLD: 0.1 K/MCL (ref 0–0.5)
EOSINOPHIL NFR BLD: 3 %
ERYTHROCYTE [DISTWIDTH] IN BLOOD: 18.6 % (ref 11–15)
HCT VFR BLD CALC: 27.7 % (ref 36–46.5)
HGB BLD-MCNC: 8.6 G/DL (ref 12–15.5)
HYPOCHROMIA BLD QL SMEAR: ABNORMAL
LYMPHOCYTES # BLD: 1.2 K/MCL (ref 1–4)
LYMPHOCYTES NFR BLD: 13 %
MCH RBC QN AUTO: 20.2 PG (ref 26–34)
MCHC RBC AUTO-ENTMCNC: 31 G/DL (ref 32–36.5)
MCV RBC AUTO: 65.2 FL (ref 78–100)
MONOCYTES # BLD: 0.5 K/MCL (ref 0.3–0.9)
MONOCYTES NFR BLD: 11 %
NEUTROPHILS # BLD: 2.9 K/MCL (ref 1.8–7.7)
NEUTS BAND NFR BLD: 14 % (ref 0–10)
NEUTS SEG NFR BLD: 47 %
NRBC BLD MANUAL-RTO: 3 /100 WBC
OVALOCYTES BLD QL SMEAR: ABNORMAL
PLAT MORPH BLD: NORMAL
PLATELET # BLD AUTO: 232 K/MCL (ref 140–450)
RBC # BLD: 4.25 MIL/MCL (ref 4–5.2)
TARGETS BLD QL SMEAR: ABNORMAL
VARIANT LYMPHS NFR BLD: 12 % (ref 0–5)
WBC # BLD: 4.8 K/MCL (ref 4.2–11)
WBC MORPH BLD: ABNORMAL

## 2025-05-21 PROCEDURE — 96372 THER/PROPH/DIAG INJ SC/IM: CPT | Performed by: INTERNAL MEDICINE

## 2025-05-21 PROCEDURE — 36415 COLL VENOUS BLD VENIPUNCTURE: CPT | Performed by: CLINICAL MEDICAL LABORATORY

## 2025-05-21 PROCEDURE — 85027 COMPLETE CBC AUTOMATED: CPT | Performed by: CLINICAL MEDICAL LABORATORY

## 2025-05-21 PROCEDURE — 10002800 HB RX 250 W HCPCS: Performed by: INTERNAL MEDICINE

## 2025-05-21 RX ADMIN — EPOETIN ALFA-EPBX 20000 UNITS: 40000 INJECTION, SOLUTION INTRAVENOUS; SUBCUTANEOUS at 14:04

## 2025-05-21 ASSESSMENT — PATIENT HEALTH QUESTIONNAIRE - PHQ9
1. LITTLE INTEREST OR PLEASURE IN DOING THINGS: NOT AT ALL
CLINICAL INTERPRETATION OF PHQ2 SCORE: NO FURTHER SCREENING NEEDED
2. FEELING DOWN, DEPRESSED OR HOPELESS: NOT AT ALL
SUM OF ALL RESPONSES TO PHQ9 QUESTIONS 1 AND 2: 0

## 2025-05-21 ASSESSMENT — PAIN SCALES - GENERAL: PAINLEVEL_OUTOF10: 0

## 2025-05-22 ENCOUNTER — RESULTS FOLLOW-UP (OUTPATIENT)
Dept: HEMATOLOGY/ONCOLOGY | Age: 85
End: 2025-05-22

## 2025-06-18 ENCOUNTER — HOSPITAL ENCOUNTER (OUTPATIENT)
Dept: INFUSION THERAPY | Age: 85
Discharge: STILL ADMITTED OR ADMINISTRATIVE | End: 2025-06-18
Attending: INTERNAL MEDICINE

## 2025-06-18 ENCOUNTER — LAB SERVICES (OUTPATIENT)
Dept: LAB | Age: 85
End: 2025-06-18

## 2025-06-18 VITALS
OXYGEN SATURATION: 95 % | HEART RATE: 73 BPM | TEMPERATURE: 97.7 F | DIASTOLIC BLOOD PRESSURE: 63 MMHG | SYSTOLIC BLOOD PRESSURE: 101 MMHG | BODY MASS INDEX: 25.95 KG/M2 | WEIGHT: 144.18 LBS

## 2025-06-18 DIAGNOSIS — D63.8 ANEMIA, CHRONIC DISEASE: Primary | ICD-10-CM

## 2025-06-18 DIAGNOSIS — D63.8 ANEMIA, CHRONIC DISEASE: ICD-10-CM

## 2025-06-18 LAB
BASOPHILS # BLD: 0.1 K/MCL (ref 0–0.3)
BASOPHILS NFR BLD: 1 %
DEPRECATED RDW RBC: 40.2 FL (ref 39–50)
EOSINOPHIL # BLD: 0.1 K/MCL (ref 0–0.5)
EOSINOPHIL NFR BLD: 2 %
ERYTHROCYTE [DISTWIDTH] IN BLOOD: 19.5 % (ref 11–15)
HCT VFR BLD CALC: 27.1 % (ref 36–46.5)
HGB BLD-MCNC: 8.6 G/DL (ref 12–15.5)
IMM GRANULOCYTES # BLD AUTO: 0.1 K/MCL (ref 0–0.2)
IMM GRANULOCYTES # BLD: 1 %
LYMPHOCYTES # BLD: 2.4 K/MCL (ref 1–4)
LYMPHOCYTES NFR BLD: 35 %
MCH RBC QN AUTO: 20.1 PG (ref 26–34)
MCHC RBC AUTO-ENTMCNC: 31.7 G/DL (ref 32–36.5)
MCV RBC AUTO: 63.3 FL (ref 78–100)
MONOCYTES # BLD: 0.6 K/MCL (ref 0.3–0.9)
MONOCYTES NFR BLD: 9 %
NEUTROPHILS # BLD: 3.7 K/MCL (ref 1.8–7.7)
NEUTROPHILS NFR BLD: 52 %
NRBC BLD MANUAL-RTO: 3 /100 WBC
PLATELET # BLD AUTO: 255 K/MCL (ref 140–450)
RBC # BLD: 4.28 MIL/MCL (ref 4–5.2)
WBC # BLD: 6.9 K/MCL (ref 4.2–11)

## 2025-06-18 PROCEDURE — 96372 THER/PROPH/DIAG INJ SC/IM: CPT | Performed by: INTERNAL MEDICINE

## 2025-06-18 PROCEDURE — 10002800 HB RX 250 W HCPCS: Performed by: INTERNAL MEDICINE

## 2025-06-18 PROCEDURE — 85025 COMPLETE CBC W/AUTO DIFF WBC: CPT | Performed by: CLINICAL MEDICAL LABORATORY

## 2025-06-18 PROCEDURE — 36415 COLL VENOUS BLD VENIPUNCTURE: CPT | Performed by: CLINICAL MEDICAL LABORATORY

## 2025-06-18 RX ADMIN — EPOETIN ALFA-EPBX 20000 UNITS: 40000 INJECTION, SOLUTION INTRAVENOUS; SUBCUTANEOUS at 12:56

## 2025-06-18 ASSESSMENT — PATIENT HEALTH QUESTIONNAIRE - PHQ9
1. LITTLE INTEREST OR PLEASURE IN DOING THINGS: NOT AT ALL
IS PATIENT ABLE TO COMPLETE PHQ2 OR PHQ9: YES
SUM OF ALL RESPONSES TO PHQ9 QUESTIONS 1 AND 2: 0
CLINICAL INTERPRETATION OF PHQ2 SCORE: NO FURTHER SCREENING NEEDED
SUM OF ALL RESPONSES TO PHQ9 QUESTIONS 1 AND 2: 0
2. FEELING DOWN, DEPRESSED OR HOPELESS: NOT AT ALL

## 2025-06-18 ASSESSMENT — PAIN SCALES - GENERAL: PAINLEVEL_OUTOF10: 0

## 2025-07-16 ENCOUNTER — LAB SERVICES (OUTPATIENT)
Dept: LAB | Age: 85
End: 2025-07-16

## 2025-07-16 ENCOUNTER — HOSPITAL ENCOUNTER (OUTPATIENT)
Dept: INFUSION THERAPY | Age: 85
Discharge: STILL ADMITTED OR ADMINISTRATIVE | End: 2025-07-16
Attending: INTERNAL MEDICINE

## 2025-07-16 VITALS
OXYGEN SATURATION: 97 % | TEMPERATURE: 98.1 F | HEART RATE: 81 BPM | BODY MASS INDEX: 25.43 KG/M2 | DIASTOLIC BLOOD PRESSURE: 69 MMHG | WEIGHT: 141.31 LBS | SYSTOLIC BLOOD PRESSURE: 103 MMHG

## 2025-07-16 DIAGNOSIS — D63.8 ANEMIA, CHRONIC DISEASE: ICD-10-CM

## 2025-07-16 DIAGNOSIS — D63.8 ANEMIA, CHRONIC DISEASE: Primary | ICD-10-CM

## 2025-07-16 DIAGNOSIS — D63.8 ANEMIA IN OTHER CHRONIC DISEASES CLASSIFIED ELSEWHERE: Primary | ICD-10-CM

## 2025-07-16 LAB
BASOPHILS # BLD: 0 K/MCL (ref 0–0.3)
BASOPHILS NFR BLD: 0 %
DEPRECATED RDW RBC: 40.8 FL (ref 39–50)
EOSINOPHIL # BLD: 0.1 K/MCL (ref 0–0.5)
EOSINOPHIL NFR BLD: 1 %
ERYTHROCYTE [DISTWIDTH] IN BLOOD: 19.4 % (ref 11–15)
HCT VFR BLD CALC: 26.1 % (ref 36–46.5)
HGB BLD-MCNC: 8.5 G/DL (ref 12–15.5)
IMM GRANULOCYTES # BLD AUTO: 0 K/MCL (ref 0–0.2)
IMM GRANULOCYTES # BLD: 0 %
LYMPHOCYTES # BLD: 2.2 K/MCL (ref 1–4)
LYMPHOCYTES NFR BLD: 31 %
MCH RBC QN AUTO: 20.7 PG (ref 26–34)
MCHC RBC AUTO-ENTMCNC: 32.6 G/DL (ref 32–36.5)
MCV RBC AUTO: 63.5 FL (ref 78–100)
MONOCYTES # BLD: 0.6 K/MCL (ref 0.3–0.9)
MONOCYTES NFR BLD: 8 %
NEUTROPHILS # BLD: 4.2 K/MCL (ref 1.8–7.7)
NEUTROPHILS NFR BLD: 60 %
NRBC BLD MANUAL-RTO: 3 /100 WBC
PLATELET # BLD AUTO: 254 K/MCL (ref 140–450)
RAINBOW EXTRA TUBES HOLD SPECIMEN: NORMAL
RBC # BLD: 4.11 MIL/MCL (ref 4–5.2)
WBC # BLD: 7.1 K/MCL (ref 4.2–11)

## 2025-07-16 PROCEDURE — 96372 THER/PROPH/DIAG INJ SC/IM: CPT | Performed by: INTERNAL MEDICINE

## 2025-07-16 PROCEDURE — 10002800 HB RX 250 W HCPCS: Performed by: INTERNAL MEDICINE

## 2025-07-16 PROCEDURE — 85025 COMPLETE CBC W/AUTO DIFF WBC: CPT | Performed by: CLINICAL MEDICAL LABORATORY

## 2025-07-16 PROCEDURE — 36415 COLL VENOUS BLD VENIPUNCTURE: CPT | Performed by: CLINICAL MEDICAL LABORATORY

## 2025-07-16 RX ADMIN — EPOETIN ALFA-EPBX 20000 UNITS: 40000 INJECTION, SOLUTION INTRAVENOUS; SUBCUTANEOUS at 13:58

## 2025-07-16 ASSESSMENT — PATIENT HEALTH QUESTIONNAIRE - PHQ9
IS PATIENT ABLE TO COMPLETE PHQ2 OR PHQ9: YES
2. FEELING DOWN, DEPRESSED OR HOPELESS: NOT AT ALL
SUM OF ALL RESPONSES TO PHQ9 QUESTIONS 1 AND 2: 0
1. LITTLE INTEREST OR PLEASURE IN DOING THINGS: NOT AT ALL
CLINICAL INTERPRETATION OF PHQ2 SCORE: NO FURTHER SCREENING NEEDED
SUM OF ALL RESPONSES TO PHQ9 QUESTIONS 1 AND 2: 0

## 2025-08-08 ENCOUNTER — CLINICAL ABSTRACT (OUTPATIENT)
Dept: HEMATOLOGY/ONCOLOGY | Age: 85
End: 2025-08-08

## 2025-08-08 DIAGNOSIS — D63.8 ANEMIA, CHRONIC DISEASE: Primary | ICD-10-CM

## 2025-08-08 RX ORDER — DIPHENHYDRAMINE HYDROCHLORIDE 50 MG/ML
50 INJECTION, SOLUTION INTRAMUSCULAR; INTRAVENOUS
Start: 2025-08-13

## 2025-08-08 RX ORDER — FAMOTIDINE 10 MG/ML
20 INJECTION, SOLUTION INTRAVENOUS
OUTPATIENT
Start: 2025-08-13

## 2025-08-08 RX ORDER — FAMOTIDINE 10 MG/ML
20 INJECTION, SOLUTION INTRAVENOUS
OUTPATIENT
Start: 2025-09-24

## 2025-08-08 RX ORDER — ALBUTEROL SULFATE 90 UG/1
2 INHALANT RESPIRATORY (INHALATION)
OUTPATIENT
Start: 2025-10-22

## 2025-08-08 RX ORDER — FAMOTIDINE 10 MG/ML
20 INJECTION, SOLUTION INTRAVENOUS
OUTPATIENT
Start: 2025-12-10

## 2025-08-08 RX ORDER — ALBUTEROL SULFATE 0.83 MG/ML
5 SOLUTION RESPIRATORY (INHALATION)
OUTPATIENT
Start: 2025-09-24

## 2025-08-08 RX ORDER — ALBUTEROL SULFATE 0.83 MG/ML
5 SOLUTION RESPIRATORY (INHALATION)
OUTPATIENT
Start: 2025-08-13

## 2025-08-08 RX ORDER — ALBUTEROL SULFATE 90 UG/1
2 INHALANT RESPIRATORY (INHALATION)
OUTPATIENT
Start: 2025-09-24

## 2025-08-08 RX ORDER — DIPHENHYDRAMINE HYDROCHLORIDE 50 MG/ML
50 INJECTION, SOLUTION INTRAMUSCULAR; INTRAVENOUS
Start: 2025-10-22

## 2025-08-08 RX ORDER — ALBUTEROL SULFATE 90 UG/1
2 INHALANT RESPIRATORY (INHALATION)
OUTPATIENT
Start: 2025-08-13

## 2025-08-08 RX ORDER — DIPHENHYDRAMINE HYDROCHLORIDE 50 MG/ML
50 INJECTION, SOLUTION INTRAMUSCULAR; INTRAVENOUS
Start: 2025-12-10

## 2025-08-08 RX ORDER — FAMOTIDINE 10 MG/ML
20 INJECTION, SOLUTION INTRAVENOUS
OUTPATIENT
Start: 2025-10-22

## 2025-08-08 RX ORDER — ALBUTEROL SULFATE 0.83 MG/ML
5 SOLUTION RESPIRATORY (INHALATION)
OUTPATIENT
Start: 2025-12-10

## 2025-08-08 RX ORDER — DIPHENHYDRAMINE HYDROCHLORIDE 50 MG/ML
50 INJECTION, SOLUTION INTRAMUSCULAR; INTRAVENOUS
Start: 2025-09-24

## 2025-08-08 RX ORDER — ALBUTEROL SULFATE 0.83 MG/ML
5 SOLUTION RESPIRATORY (INHALATION)
OUTPATIENT
Start: 2025-10-22

## 2025-08-08 RX ORDER — ALBUTEROL SULFATE 90 UG/1
2 INHALANT RESPIRATORY (INHALATION)
OUTPATIENT
Start: 2025-12-10

## 2025-08-13 ENCOUNTER — LAB SERVICES (OUTPATIENT)
Dept: LAB | Age: 85
End: 2025-08-13

## 2025-08-13 ENCOUNTER — HOSPITAL ENCOUNTER (OUTPATIENT)
Dept: INFUSION THERAPY | Age: 85
Discharge: STILL ADMITTED OR ADMINISTRATIVE | End: 2025-08-13
Attending: INTERNAL MEDICINE

## 2025-08-13 VITALS
HEART RATE: 83 BPM | WEIGHT: 139.77 LBS | RESPIRATION RATE: 14 BRPM | SYSTOLIC BLOOD PRESSURE: 103 MMHG | TEMPERATURE: 97.9 F | BODY MASS INDEX: 25.16 KG/M2 | OXYGEN SATURATION: 100 % | DIASTOLIC BLOOD PRESSURE: 64 MMHG

## 2025-08-13 DIAGNOSIS — D63.8 ANEMIA, CHRONIC DISEASE: ICD-10-CM

## 2025-08-13 DIAGNOSIS — D63.8 ANEMIA, CHRONIC DISEASE: Primary | ICD-10-CM

## 2025-08-13 LAB
BASOPHILS # BLD: 0.1 K/MCL (ref 0–0.3)
BASOPHILS NFR BLD: 1 %
DEPRECATED RDW RBC: 43.9 FL (ref 39–50)
EOSINOPHIL # BLD: 0.1 K/MCL (ref 0–0.5)
EOSINOPHIL NFR BLD: 1 %
ERYTHROCYTE [DISTWIDTH] IN BLOOD: 19.8 % (ref 11–15)
HCT VFR BLD CALC: 27.1 % (ref 36–46.5)
HGB BLD-MCNC: 8.6 G/DL (ref 12–15.5)
IMM GRANULOCYTES # BLD AUTO: 0.1 K/MCL (ref 0–0.2)
IMM GRANULOCYTES # BLD: 1 %
LYMPHOCYTES # BLD: 2.4 K/MCL (ref 1–4)
LYMPHOCYTES NFR BLD: 31 %
MCH RBC QN AUTO: 21 PG (ref 26–34)
MCHC RBC AUTO-ENTMCNC: 31.7 G/DL (ref 32–36.5)
MCV RBC AUTO: 66.1 FL (ref 78–100)
MONOCYTES # BLD: 0.7 K/MCL (ref 0.3–0.9)
MONOCYTES NFR BLD: 10 %
NEUTROPHILS # BLD: 4.2 K/MCL (ref 1.8–7.7)
NEUTROPHILS NFR BLD: 56 %
NRBC BLD MANUAL-RTO: 3 /100 WBC
PLATELET # BLD AUTO: 253 K/MCL (ref 140–450)
RBC # BLD: 4.1 MIL/MCL (ref 4–5.2)
WBC # BLD: 7.5 K/MCL (ref 4.2–11)

## 2025-08-13 PROCEDURE — 10002800 HB RX 250 W HCPCS: Performed by: INTERNAL MEDICINE

## 2025-08-13 PROCEDURE — 96372 THER/PROPH/DIAG INJ SC/IM: CPT | Performed by: INTERNAL MEDICINE

## 2025-08-13 RX ADMIN — EPOETIN ALFA-EPBX 20000 UNITS: 40000 INJECTION, SOLUTION INTRAVENOUS; SUBCUTANEOUS at 13:52

## 2025-08-13 ASSESSMENT — PATIENT HEALTH QUESTIONNAIRE - PHQ9: SUM OF ALL RESPONSES TO PHQ9 QUESTIONS 1 AND 2: 0

## 2025-08-13 ASSESSMENT — PAIN SCALES - GENERAL: PAINLEVEL_OUTOF10: 0

## 2025-08-20 ENCOUNTER — APPOINTMENT (OUTPATIENT)
Dept: CARDIOLOGY | Age: 85
End: 2025-08-20

## 2025-08-20 VITALS
DIASTOLIC BLOOD PRESSURE: 70 MMHG | HEART RATE: 88 BPM | HEIGHT: 63 IN | WEIGHT: 137 LBS | SYSTOLIC BLOOD PRESSURE: 110 MMHG | BODY MASS INDEX: 24.27 KG/M2

## 2025-08-20 DIAGNOSIS — I50.22 HEART FAILURE WITH MILDLY REDUCED EJECTION FRACTION  (CMD): ICD-10-CM

## 2025-08-20 DIAGNOSIS — Z85.43 HISTORY OF OVARIAN CANCER: ICD-10-CM

## 2025-08-20 DIAGNOSIS — I48.0 PAROXYSMAL ATRIAL FIBRILLATION  (CMD): Primary | ICD-10-CM

## 2025-08-20 PROCEDURE — 99214 OFFICE O/P EST MOD 30 MIN: CPT | Performed by: INTERNAL MEDICINE

## 2025-08-20 ASSESSMENT — ENCOUNTER SYMPTOMS
DIZZINESS: 0
FATIGUE: 1
NAUSEA: 0
BLOOD IN STOOL: 0
NERVOUS/ANXIOUS: 0
VOMITING: 0
SHORTNESS OF BREATH: 1
BRUISES/BLEEDS EASILY: 1
HEADACHES: 0

## 2025-08-21 ENCOUNTER — HOSPITAL ENCOUNTER (OUTPATIENT)
Age: 85
End: 2025-08-21
Attending: INTERNAL MEDICINE | Admitting: INTERNAL MEDICINE

## 2025-08-21 DIAGNOSIS — I48.0 PAROXYSMAL ATRIAL FIBRILLATION  (CMD): ICD-10-CM

## (undated) NOTE — ED AVS SNAPSHOT
Yessica Jimenez   MRN: [id number]    Department:  [facility] Emergency Department   Date of Visit:  1/22/2019           Disclosure     Insurance plans vary and the physician(s) referred by the ER may not be covered by your plan.  Please contact within the next three months to obtain basic health screening including reassessment of your blood pressure.     IF THERE IS ANY CHANGE OR WORSENING OF YOUR CONDITION, CALL YOUR PRIMARY CARE PHYSICIAN AT ONCE OR RETURN IMMEDIATELY TO THE EMERGENCY DEPARTMEN